# Patient Record
Sex: MALE | Race: WHITE | NOT HISPANIC OR LATINO | Employment: UNEMPLOYED | URBAN - METROPOLITAN AREA
[De-identification: names, ages, dates, MRNs, and addresses within clinical notes are randomized per-mention and may not be internally consistent; named-entity substitution may affect disease eponyms.]

---

## 2024-04-24 ENCOUNTER — APPOINTMENT (OUTPATIENT)
Dept: RADIOLOGY | Facility: HOSPITAL | Age: 44
DRG: 329 | End: 2024-04-24
Payer: COMMERCIAL

## 2024-04-24 ENCOUNTER — HOSPITAL ENCOUNTER (INPATIENT)
Facility: HOSPITAL | Age: 44
LOS: 15 days | Discharge: HOME | DRG: 329 | End: 2024-05-09
Attending: EMERGENCY MEDICINE | Admitting: INTERNAL MEDICINE
Payer: COMMERCIAL

## 2024-04-24 ENCOUNTER — ANESTHESIA EVENT (OUTPATIENT)
Dept: OPERATING ROOM | Facility: HOSPITAL | Age: 44
DRG: 329 | End: 2024-04-24
Payer: COMMERCIAL

## 2024-04-24 ENCOUNTER — ANESTHESIA (OUTPATIENT)
Dept: OPERATING ROOM | Facility: HOSPITAL | Age: 44
DRG: 329 | End: 2024-04-24
Payer: COMMERCIAL

## 2024-04-24 ENCOUNTER — APPOINTMENT (OUTPATIENT)
Dept: CARDIOLOGY | Facility: HOSPITAL | Age: 44
DRG: 329 | End: 2024-04-24
Payer: COMMERCIAL

## 2024-04-24 DIAGNOSIS — K65.1 INTRA-ABDOMINAL ABSCESS (MULTI): ICD-10-CM

## 2024-04-24 DIAGNOSIS — K63.1 BOWEL PERFORATION (MULTI): Primary | ICD-10-CM

## 2024-04-24 DIAGNOSIS — I10 PRIMARY HYPERTENSION: ICD-10-CM

## 2024-04-24 PROBLEM — K57.92 DIVERTICULITIS: Status: ACTIVE | Noted: 2024-04-24

## 2024-04-24 LAB
ABO GROUP (TYPE) IN BLOOD: NORMAL
ALBUMIN SERPL BCP-MCNC: 3.6 G/DL (ref 3.4–5)
ALP SERPL-CCNC: 73 U/L (ref 33–120)
ALT SERPL W P-5'-P-CCNC: 7 U/L (ref 10–52)
ANION GAP SERPL CALC-SCNC: 19 MMOL/L (ref 10–20)
ANTIBODY SCREEN: NORMAL
APPEARANCE UR: CLEAR
APTT PPP: 30 SECONDS (ref 27–38)
AST SERPL W P-5'-P-CCNC: 7 U/L (ref 9–39)
ATRIAL RATE: 116 BPM
BASOPHILS # BLD AUTO: 0.03 X10*3/UL (ref 0–0.1)
BASOPHILS NFR BLD AUTO: 0.3 %
BILIRUB SERPL-MCNC: 0.9 MG/DL (ref 0–1.2)
BILIRUB UR STRIP.AUTO-MCNC: ABNORMAL MG/DL
BUN SERPL-MCNC: 11 MG/DL (ref 6–23)
CALCIUM SERPL-MCNC: 9.2 MG/DL (ref 8.6–10.3)
CHLORIDE SERPL-SCNC: 93 MMOL/L (ref 98–107)
CO2 SERPL-SCNC: 24 MMOL/L (ref 21–32)
COLOR UR: YELLOW
CREAT SERPL-MCNC: 0.78 MG/DL (ref 0.5–1.3)
EGFRCR SERPLBLD CKD-EPI 2021: >90 ML/MIN/1.73M*2
EOSINOPHIL # BLD AUTO: 0.02 X10*3/UL (ref 0–0.7)
EOSINOPHIL NFR BLD AUTO: 0.2 %
ERYTHROCYTE [DISTWIDTH] IN BLOOD BY AUTOMATED COUNT: 13 % (ref 11.5–14.5)
GLUCOSE SERPL-MCNC: 112 MG/DL (ref 74–99)
GLUCOSE UR STRIP.AUTO-MCNC: NORMAL MG/DL
HCT VFR BLD AUTO: 36 % (ref 41–52)
HGB BLD-MCNC: 11.7 G/DL (ref 13.5–17.5)
HOLD SPECIMEN: NORMAL
IMM GRANULOCYTES # BLD AUTO: 0.06 X10*3/UL (ref 0–0.7)
IMM GRANULOCYTES NFR BLD AUTO: 0.5 % (ref 0–0.9)
INR PPP: 1.7 (ref 0.9–1.1)
KETONES UR STRIP.AUTO-MCNC: ABNORMAL MG/DL
LACTATE SERPL-SCNC: 2.2 MMOL/L (ref 0.4–2)
LACTATE SERPL-SCNC: 2.3 MMOL/L (ref 0.4–2)
LEUKOCYTE ESTERASE UR QL STRIP.AUTO: NEGATIVE
LIPASE SERPL-CCNC: <3 U/L (ref 9–82)
LYMPHOCYTES # BLD AUTO: 1.63 X10*3/UL (ref 1.2–4.8)
LYMPHOCYTES NFR BLD AUTO: 13.9 %
MCH RBC QN AUTO: 25.4 PG (ref 26–34)
MCHC RBC AUTO-ENTMCNC: 32.5 G/DL (ref 32–36)
MCV RBC AUTO: 78 FL (ref 80–100)
MONOCYTES # BLD AUTO: 1.79 X10*3/UL (ref 0.1–1)
MONOCYTES NFR BLD AUTO: 15.3 %
MUCOUS THREADS #/AREA URNS AUTO: NORMAL /LPF
NEUTROPHILS # BLD AUTO: 8.18 X10*3/UL (ref 1.2–7.7)
NEUTROPHILS NFR BLD AUTO: 69.8 %
NITRITE UR QL STRIP.AUTO: NEGATIVE
NRBC BLD-RTO: 0 /100 WBCS (ref 0–0)
P AXIS: 67 DEGREES
P OFFSET: 209 MS
P ONSET: 157 MS
PH UR STRIP.AUTO: 5.5 [PH]
PLATELET # BLD AUTO: 683 X10*3/UL (ref 150–450)
POTASSIUM SERPL-SCNC: 3.6 MMOL/L (ref 3.5–5.3)
PR INTERVAL: 130 MS
PROT SERPL-MCNC: 8.4 G/DL (ref 6.4–8.2)
PROT UR STRIP.AUTO-MCNC: ABNORMAL MG/DL
PROTHROMBIN TIME: 18.9 SECONDS (ref 9.8–12.8)
Q ONSET: 222 MS
QRS COUNT: 19 BEATS
QRS DURATION: 80 MS
QT INTERVAL: 300 MS
QTC CALCULATION(BAZETT): 417 MS
QTC FREDERICIA: 374 MS
R AXIS: 61 DEGREES
RBC # BLD AUTO: 4.61 X10*6/UL (ref 4.5–5.9)
RBC # UR STRIP.AUTO: NEGATIVE /UL
RBC #/AREA URNS AUTO: NORMAL /HPF
RH FACTOR (ANTIGEN D): NORMAL
SODIUM SERPL-SCNC: 132 MMOL/L (ref 136–145)
SP GR UR STRIP.AUTO: 1.05
T AXIS: 39 DEGREES
T OFFSET: 372 MS
UROBILINOGEN UR STRIP.AUTO-MCNC: ABNORMAL MG/DL
VENTRICULAR RATE: 116 BPM
WBC # BLD AUTO: 11.7 X10*3/UL (ref 4.4–11.3)
WBC #/AREA URNS AUTO: NORMAL /HPF

## 2024-04-24 PROCEDURE — 87040 BLOOD CULTURE FOR BACTERIA: CPT | Mod: AHULAB

## 2024-04-24 PROCEDURE — 36415 COLL VENOUS BLD VENIPUNCTURE: CPT

## 2024-04-24 PROCEDURE — 85025 COMPLETE CBC W/AUTO DIFF WBC: CPT

## 2024-04-24 PROCEDURE — 2500000004 HC RX 250 GENERAL PHARMACY W/ HCPCS (ALT 636 FOR OP/ED)

## 2024-04-24 PROCEDURE — 2500000004 HC RX 250 GENERAL PHARMACY W/ HCPCS (ALT 636 FOR OP/ED): Performed by: EMERGENCY MEDICINE

## 2024-04-24 PROCEDURE — 86900 BLOOD TYPING SEROLOGIC ABO: CPT | Performed by: EMERGENCY MEDICINE

## 2024-04-24 PROCEDURE — 3600000003 HC OR TIME - INITIAL BASE CHARGE - PROCEDURE LEVEL THREE: Performed by: SURGERY

## 2024-04-24 PROCEDURE — 2550000001 HC RX 255 CONTRASTS: Performed by: EMERGENCY MEDICINE

## 2024-04-24 PROCEDURE — A44143 PR PART REMOVAL COLON W END COLOSTOMY: Performed by: NURSE ANESTHETIST, CERTIFIED REGISTERED

## 2024-04-24 PROCEDURE — 93005 ELECTROCARDIOGRAM TRACING: CPT

## 2024-04-24 PROCEDURE — 36415 COLL VENOUS BLD VENIPUNCTURE: CPT | Performed by: EMERGENCY MEDICINE

## 2024-04-24 PROCEDURE — 87075 CULTR BACTERIA EXCEPT BLOOD: CPT | Mod: AHULAB | Performed by: REGISTERED NURSE

## 2024-04-24 PROCEDURE — 83690 ASSAY OF LIPASE: CPT

## 2024-04-24 PROCEDURE — 96361 HYDRATE IV INFUSION ADD-ON: CPT | Mod: 59

## 2024-04-24 PROCEDURE — A44143 PR PART REMOVAL COLON W END COLOSTOMY: Performed by: ANESTHESIOLOGY

## 2024-04-24 PROCEDURE — 99222 1ST HOSP IP/OBS MODERATE 55: CPT | Performed by: REGISTERED NURSE

## 2024-04-24 PROCEDURE — 87070 CULTURE OTHR SPECIMN AEROBIC: CPT | Mod: AHULAB | Performed by: REGISTERED NURSE

## 2024-04-24 PROCEDURE — 88307 TISSUE EXAM BY PATHOLOGIST: CPT | Performed by: PATHOLOGY

## 2024-04-24 PROCEDURE — 85730 THROMBOPLASTIN TIME PARTIAL: CPT | Performed by: EMERGENCY MEDICINE

## 2024-04-24 PROCEDURE — 51702 INSERT TEMP BLADDER CATH: CPT

## 2024-04-24 PROCEDURE — 96375 TX/PRO/DX INJ NEW DRUG ADDON: CPT | Mod: 59

## 2024-04-24 PROCEDURE — 83605 ASSAY OF LACTIC ACID: CPT

## 2024-04-24 PROCEDURE — 2500000004 HC RX 250 GENERAL PHARMACY W/ HCPCS (ALT 636 FOR OP/ED): Performed by: NURSE ANESTHETIST, CERTIFIED REGISTERED

## 2024-04-24 PROCEDURE — 2720000007 HC OR 272 NO HCPCS: Performed by: SURGERY

## 2024-04-24 PROCEDURE — 7100000002 HC RECOVERY ROOM TIME - EACH INCREMENTAL 1 MINUTE: Performed by: SURGERY

## 2024-04-24 PROCEDURE — 74177 CT ABD & PELVIS W/CONTRAST: CPT

## 2024-04-24 PROCEDURE — P9045 ALBUMIN (HUMAN), 5%, 250 ML: HCPCS | Mod: JZ | Performed by: NURSE ANESTHETIST, CERTIFIED REGISTERED

## 2024-04-24 PROCEDURE — 2500000004 HC RX 250 GENERAL PHARMACY W/ HCPCS (ALT 636 FOR OP/ED): Performed by: SURGERY

## 2024-04-24 PROCEDURE — 99222 1ST HOSP IP/OBS MODERATE 55: CPT | Performed by: INTERNAL MEDICINE

## 2024-04-24 PROCEDURE — 85610 PROTHROMBIN TIME: CPT | Performed by: EMERGENCY MEDICINE

## 2024-04-24 PROCEDURE — 2500000004 HC RX 250 GENERAL PHARMACY W/ HCPCS (ALT 636 FOR OP/ED): Performed by: ANESTHESIOLOGY

## 2024-04-24 PROCEDURE — 3700000002 HC GENERAL ANESTHESIA TIME - EACH INCREMENTAL 1 MINUTE: Performed by: SURGERY

## 2024-04-24 PROCEDURE — 71045 X-RAY EXAM CHEST 1 VIEW: CPT | Performed by: RADIOLOGY

## 2024-04-24 PROCEDURE — 96374 THER/PROPH/DIAG INJ IV PUSH: CPT | Mod: 59

## 2024-04-24 PROCEDURE — 87185 SC STD ENZYME DETCJ PER NZM: CPT | Mod: AHULAB | Performed by: REGISTERED NURSE

## 2024-04-24 PROCEDURE — 74177 CT ABD & PELVIS W/CONTRAST: CPT | Performed by: RADIOLOGY

## 2024-04-24 PROCEDURE — 81001 URINALYSIS AUTO W/SCOPE: CPT

## 2024-04-24 PROCEDURE — 99285 EMERGENCY DEPT VISIT HI MDM: CPT | Mod: 25

## 2024-04-24 PROCEDURE — 0D1N0Z4 BYPASS SIGMOID COLON TO CUTANEOUS, OPEN APPROACH: ICD-10-PCS | Performed by: SURGERY

## 2024-04-24 PROCEDURE — 0DBN0ZZ EXCISION OF SIGMOID COLON, OPEN APPROACH: ICD-10-PCS | Performed by: SURGERY

## 2024-04-24 PROCEDURE — 86850 RBC ANTIBODY SCREEN: CPT | Performed by: EMERGENCY MEDICINE

## 2024-04-24 PROCEDURE — 86901 BLOOD TYPING SEROLOGIC RH(D): CPT | Performed by: EMERGENCY MEDICINE

## 2024-04-24 PROCEDURE — A4217 STERILE WATER/SALINE, 500 ML: HCPCS | Performed by: SURGERY

## 2024-04-24 PROCEDURE — 80053 COMPREHEN METABOLIC PANEL: CPT

## 2024-04-24 PROCEDURE — 71045 X-RAY EXAM CHEST 1 VIEW: CPT

## 2024-04-24 PROCEDURE — 88307 TISSUE EXAM BY PATHOLOGIST: CPT | Mod: TC,AHULAB | Performed by: REGISTERED NURSE

## 2024-04-24 PROCEDURE — 44143 PARTIAL REMOVAL OF COLON: CPT | Performed by: SURGERY

## 2024-04-24 PROCEDURE — 3700000001 HC GENERAL ANESTHESIA TIME - INITIAL BASE CHARGE: Performed by: SURGERY

## 2024-04-24 PROCEDURE — 81003 URINALYSIS AUTO W/O SCOPE: CPT

## 2024-04-24 PROCEDURE — 99291 CRITICAL CARE FIRST HOUR: CPT | Mod: 25 | Performed by: EMERGENCY MEDICINE

## 2024-04-24 PROCEDURE — 2500000005 HC RX 250 GENERAL PHARMACY W/O HCPCS: Performed by: NURSE ANESTHETIST, CERTIFIED REGISTERED

## 2024-04-24 PROCEDURE — 7100000001 HC RECOVERY ROOM TIME - INITIAL BASE CHARGE: Performed by: SURGERY

## 2024-04-24 PROCEDURE — 3600000008 HC OR TIME - EACH INCREMENTAL 1 MINUTE - PROCEDURE LEVEL THREE: Performed by: SURGERY

## 2024-04-24 PROCEDURE — 1200000002 HC GENERAL ROOM WITH TELEMETRY DAILY

## 2024-04-24 RX ORDER — ONDANSETRON 4 MG/1
4 TABLET, FILM COATED ORAL EVERY 8 HOURS PRN
Status: DISCONTINUED | OUTPATIENT
Start: 2024-04-24 | End: 2024-04-26

## 2024-04-24 RX ORDER — SODIUM CHLORIDE, SODIUM LACTATE, POTASSIUM CHLORIDE, CALCIUM CHLORIDE 600; 310; 30; 20 MG/100ML; MG/100ML; MG/100ML; MG/100ML
INJECTION, SOLUTION INTRAVENOUS CONTINUOUS PRN
Status: DISCONTINUED | OUTPATIENT
Start: 2024-04-24 | End: 2024-04-24

## 2024-04-24 RX ORDER — POLYETHYLENE GLYCOL 3350 17 G/17G
17 POWDER, FOR SOLUTION ORAL DAILY
Status: DISCONTINUED | OUTPATIENT
Start: 2024-04-25 | End: 2024-04-24 | Stop reason: SDUPTHER

## 2024-04-24 RX ORDER — LIDOCAINE HYDROCHLORIDE 20 MG/ML
INJECTION, SOLUTION EPIDURAL; INFILTRATION; INTRACAUDAL; PERINEURAL AS NEEDED
Status: DISCONTINUED | OUTPATIENT
Start: 2024-04-24 | End: 2024-04-24

## 2024-04-24 RX ORDER — HEPARIN SODIUM 5000 [USP'U]/ML
5000 INJECTION, SOLUTION INTRAVENOUS; SUBCUTANEOUS EVERY 8 HOURS SCHEDULED
Status: DISCONTINUED | OUTPATIENT
Start: 2024-04-24 | End: 2024-05-09 | Stop reason: HOSPADM

## 2024-04-24 RX ORDER — ACETAMINOPHEN 650 MG/1
650 SUPPOSITORY RECTAL EVERY 4 HOURS PRN
Status: DISCONTINUED | OUTPATIENT
Start: 2024-04-24 | End: 2024-04-26

## 2024-04-24 RX ORDER — ACETAMINOPHEN 325 MG/1
650 TABLET ORAL EVERY 4 HOURS PRN
Status: DISCONTINUED | OUTPATIENT
Start: 2024-04-24 | End: 2024-04-24 | Stop reason: HOSPADM

## 2024-04-24 RX ORDER — NALOXONE HYDROCHLORIDE 0.4 MG/ML
0.2 INJECTION, SOLUTION INTRAMUSCULAR; INTRAVENOUS; SUBCUTANEOUS AS NEEDED
Status: DISCONTINUED | OUTPATIENT
Start: 2024-04-24 | End: 2024-05-09 | Stop reason: HOSPADM

## 2024-04-24 RX ORDER — PROPOFOL 10 MG/ML
INJECTION, EMULSION INTRAVENOUS AS NEEDED
Status: DISCONTINUED | OUTPATIENT
Start: 2024-04-24 | End: 2024-04-24

## 2024-04-24 RX ORDER — VANCOMYCIN HYDROCHLORIDE 1 G/20ML
INJECTION, POWDER, LYOPHILIZED, FOR SOLUTION INTRAVENOUS DAILY PRN
Status: DISCONTINUED | OUTPATIENT
Start: 2024-04-24 | End: 2024-04-28

## 2024-04-24 RX ORDER — ROCURONIUM BROMIDE 10 MG/ML
INJECTION, SOLUTION INTRAVENOUS AS NEEDED
Status: DISCONTINUED | OUTPATIENT
Start: 2024-04-24 | End: 2024-04-24

## 2024-04-24 RX ORDER — ONDANSETRON HYDROCHLORIDE 2 MG/ML
4 INJECTION, SOLUTION INTRAVENOUS EVERY 8 HOURS PRN
Status: DISCONTINUED | OUTPATIENT
Start: 2024-04-24 | End: 2024-04-26

## 2024-04-24 RX ORDER — LIDOCAINE HYDROCHLORIDE 10 MG/ML
0.1 INJECTION, SOLUTION EPIDURAL; INFILTRATION; INTRACAUDAL; PERINEURAL ONCE
Status: DISCONTINUED | OUTPATIENT
Start: 2024-04-24 | End: 2024-04-24 | Stop reason: HOSPADM

## 2024-04-24 RX ORDER — MIDAZOLAM HYDROCHLORIDE 1 MG/ML
INJECTION INTRAMUSCULAR; INTRAVENOUS AS NEEDED
Status: DISCONTINUED | OUTPATIENT
Start: 2024-04-24 | End: 2024-04-24

## 2024-04-24 RX ORDER — MORPHINE SULFATE 2 MG/ML
2 INJECTION, SOLUTION INTRAMUSCULAR; INTRAVENOUS EVERY 4 HOURS PRN
Status: DISCONTINUED | OUTPATIENT
Start: 2024-04-24 | End: 2024-05-09 | Stop reason: HOSPADM

## 2024-04-24 RX ORDER — HYDROMORPHONE HCL/0.9% NACL/PF 15 MG/30ML
PATIENT CONTROLLED ANALGESIA SYRINGE INTRAVENOUS CONTINUOUS
Status: DISCONTINUED | OUTPATIENT
Start: 2024-04-24 | End: 2024-04-29

## 2024-04-24 RX ORDER — HYDROMORPHONE HYDROCHLORIDE 1 MG/ML
INJECTION, SOLUTION INTRAMUSCULAR; INTRAVENOUS; SUBCUTANEOUS AS NEEDED
Status: DISCONTINUED | OUTPATIENT
Start: 2024-04-24 | End: 2024-04-24

## 2024-04-24 RX ORDER — DEXMEDETOMIDINE IN 0.9 % NACL 20 MCG/5ML
SYRINGE (ML) INTRAVENOUS AS NEEDED
Status: DISCONTINUED | OUTPATIENT
Start: 2024-04-24 | End: 2024-04-24

## 2024-04-24 RX ORDER — ONDANSETRON HYDROCHLORIDE 2 MG/ML
4 INJECTION, SOLUTION INTRAVENOUS ONCE
Status: COMPLETED | OUTPATIENT
Start: 2024-04-24 | End: 2024-04-24

## 2024-04-24 RX ORDER — ACETAMINOPHEN 160 MG/5ML
650 SOLUTION ORAL EVERY 4 HOURS PRN
Status: DISCONTINUED | OUTPATIENT
Start: 2024-04-24 | End: 2024-04-26

## 2024-04-24 RX ORDER — SODIUM CHLORIDE 0.9 G/100ML
IRRIGANT IRRIGATION AS NEEDED
Status: DISCONTINUED | OUTPATIENT
Start: 2024-04-24 | End: 2024-04-24 | Stop reason: HOSPADM

## 2024-04-24 RX ORDER — PHENYLEPHRINE HCL IN 0.9% NACL 1 MG/10 ML
SYRINGE (ML) INTRAVENOUS AS NEEDED
Status: DISCONTINUED | OUTPATIENT
Start: 2024-04-24 | End: 2024-04-24

## 2024-04-24 RX ORDER — DEXTROSE MONOHYDRATE, SODIUM CHLORIDE, AND POTASSIUM CHLORIDE 50; 1.49; 4.5 G/1000ML; G/1000ML; G/1000ML
100 INJECTION, SOLUTION INTRAVENOUS CONTINUOUS
Status: DISCONTINUED | OUTPATIENT
Start: 2024-04-24 | End: 2024-05-03

## 2024-04-24 RX ORDER — DOCUSATE SODIUM 100 MG/1
100 CAPSULE, LIQUID FILLED ORAL 2 TIMES DAILY
Status: DISCONTINUED | OUTPATIENT
Start: 2024-04-24 | End: 2024-05-09 | Stop reason: HOSPADM

## 2024-04-24 RX ORDER — VANCOMYCIN HYDROCHLORIDE 1 G/200ML
1000 INJECTION, SOLUTION INTRAVENOUS EVERY 12 HOURS
Status: DISCONTINUED | OUTPATIENT
Start: 2024-04-25 | End: 2024-04-26

## 2024-04-24 RX ORDER — PANTOPRAZOLE SODIUM 40 MG/10ML
40 INJECTION, POWDER, LYOPHILIZED, FOR SOLUTION INTRAVENOUS
Status: DISCONTINUED | OUTPATIENT
Start: 2024-04-25 | End: 2024-05-09 | Stop reason: HOSPADM

## 2024-04-24 RX ORDER — ESMOLOL HYDROCHLORIDE 10 MG/ML
INJECTION, SOLUTION INTRAVENOUS CONTINUOUS PRN
Status: DISCONTINUED | OUTPATIENT
Start: 2024-04-24 | End: 2024-04-24

## 2024-04-24 RX ORDER — ONDANSETRON HYDROCHLORIDE 2 MG/ML
4 INJECTION, SOLUTION INTRAVENOUS EVERY 6 HOURS PRN
Status: DISCONTINUED | OUTPATIENT
Start: 2024-04-24 | End: 2024-04-24 | Stop reason: SDUPTHER

## 2024-04-24 RX ORDER — ESMOLOL HYDROCHLORIDE 10 MG/ML
INJECTION INTRAVENOUS AS NEEDED
Status: DISCONTINUED | OUTPATIENT
Start: 2024-04-24 | End: 2024-04-24

## 2024-04-24 RX ORDER — ACETAMINOPHEN 10 MG/ML
1000 INJECTION, SOLUTION INTRAVENOUS ONCE
Status: DISCONTINUED | OUTPATIENT
Start: 2024-04-24 | End: 2024-04-26 | Stop reason: WASHOUT

## 2024-04-24 RX ORDER — ACETAMINOPHEN 325 MG/1
650 TABLET ORAL EVERY 4 HOURS PRN
Status: DISCONTINUED | OUTPATIENT
Start: 2024-04-24 | End: 2024-04-26

## 2024-04-24 RX ORDER — SODIUM CHLORIDE, SODIUM LACTATE, POTASSIUM CHLORIDE, CALCIUM CHLORIDE 600; 310; 30; 20 MG/100ML; MG/100ML; MG/100ML; MG/100ML
100 INJECTION, SOLUTION INTRAVENOUS CONTINUOUS
Status: DISCONTINUED | OUTPATIENT
Start: 2024-04-24 | End: 2024-04-24 | Stop reason: HOSPADM

## 2024-04-24 RX ORDER — ALBUMIN HUMAN 50 G/1000ML
SOLUTION INTRAVENOUS AS NEEDED
Status: DISCONTINUED | OUTPATIENT
Start: 2024-04-24 | End: 2024-04-24

## 2024-04-24 RX ORDER — HYDROMORPHONE HYDROCHLORIDE 1 MG/ML
1 INJECTION, SOLUTION INTRAMUSCULAR; INTRAVENOUS; SUBCUTANEOUS ONCE
Status: COMPLETED | OUTPATIENT
Start: 2024-04-24 | End: 2024-04-24

## 2024-04-24 RX ORDER — POLYETHYLENE GLYCOL 3350 17 G/17G
17 POWDER, FOR SOLUTION ORAL DAILY
Status: DISCONTINUED | OUTPATIENT
Start: 2024-04-25 | End: 2024-05-09 | Stop reason: HOSPADM

## 2024-04-24 RX ORDER — HEPARIN SODIUM 5000 [USP'U]/ML
5000 INJECTION, SOLUTION INTRAVENOUS; SUBCUTANEOUS EVERY 8 HOURS
Status: DISCONTINUED | OUTPATIENT
Start: 2024-04-24 | End: 2024-04-24 | Stop reason: SDUPTHER

## 2024-04-24 RX ORDER — OXYCODONE HYDROCHLORIDE 5 MG/1
5 TABLET ORAL EVERY 4 HOURS PRN
Status: DISCONTINUED | OUTPATIENT
Start: 2024-04-24 | End: 2024-04-24 | Stop reason: HOSPADM

## 2024-04-24 RX ORDER — VANCOMYCIN HYDROCHLORIDE 1 G/200ML
1000 INJECTION, SOLUTION INTRAVENOUS ONCE
Status: COMPLETED | OUTPATIENT
Start: 2024-04-24 | End: 2024-04-24

## 2024-04-24 RX ORDER — VANCOMYCIN HYDROCHLORIDE 1 G/200ML
1 INJECTION, SOLUTION INTRAVENOUS ONCE
Status: DISCONTINUED | OUTPATIENT
Start: 2024-04-24 | End: 2024-04-24

## 2024-04-24 RX ADMIN — HYDROMORPHONE HYDROCHLORIDE 0.5 MG: 1 INJECTION, SOLUTION INTRAMUSCULAR; INTRAVENOUS; SUBCUTANEOUS at 17:39

## 2024-04-24 RX ADMIN — LIDOCAINE HYDROCHLORIDE 100 MG: 20 INJECTION, SOLUTION EPIDURAL; INFILTRATION; INTRACAUDAL; PERINEURAL at 15:54

## 2024-04-24 RX ADMIN — IOHEXOL 85 ML: 350 INJECTION, SOLUTION INTRAVENOUS at 10:49

## 2024-04-24 RX ADMIN — ESMOLOL HYDROCHLORIDE 50 MG: 100 INJECTION, SOLUTION INTRAVENOUS at 16:00

## 2024-04-24 RX ADMIN — PROPOFOL 200 MG: 10 INJECTION, EMULSION INTRAVENOUS at 15:54

## 2024-04-24 RX ADMIN — Medication 8 MCG: at 16:04

## 2024-04-24 RX ADMIN — ACETAMINOPHEN 650 MG: 325 TABLET ORAL at 19:19

## 2024-04-24 RX ADMIN — Medication 100 MCG: at 16:19

## 2024-04-24 RX ADMIN — ROCURONIUM BROMIDE 80 MG: 10 INJECTION, SOLUTION INTRAVENOUS at 15:54

## 2024-04-24 RX ADMIN — SUGAMMADEX 200 MG: 100 INJECTION, SOLUTION INTRAVENOUS at 17:47

## 2024-04-24 RX ADMIN — VANCOMYCIN HYDROCHLORIDE 1000 MG: 1 INJECTION, SOLUTION INTRAVENOUS at 13:06

## 2024-04-24 RX ADMIN — ESMOLOL HYDROCHLORIDE 17.82 MCG/KG/MIN: 10 INJECTION INTRAVENOUS at 16:48

## 2024-04-24 RX ADMIN — SODIUM CHLORIDE, POTASSIUM CHLORIDE, SODIUM LACTATE AND CALCIUM CHLORIDE: 600; 310; 30; 20 INJECTION, SOLUTION INTRAVENOUS at 17:15

## 2024-04-24 RX ADMIN — ONDANSETRON 4 MG: 2 INJECTION INTRAMUSCULAR; INTRAVENOUS at 09:30

## 2024-04-24 RX ADMIN — HYDROMORPHONE HYDROCHLORIDE 1 MG: 1 INJECTION, SOLUTION INTRAMUSCULAR; INTRAVENOUS; SUBCUTANEOUS at 09:30

## 2024-04-24 RX ADMIN — Medication 100 MCG: at 17:22

## 2024-04-24 RX ADMIN — Medication 20 MG: at 16:04

## 2024-04-24 RX ADMIN — ROCURONIUM BROMIDE 30 MG: 10 INJECTION, SOLUTION INTRAVENOUS at 17:16

## 2024-04-24 RX ADMIN — HYDROMORPHONE HYDROCHLORIDE 0.5 MG: 1 INJECTION, SOLUTION INTRAMUSCULAR; INTRAVENOUS; SUBCUTANEOUS at 19:22

## 2024-04-24 RX ADMIN — ROCURONIUM BROMIDE 20 MG: 10 INJECTION, SOLUTION INTRAVENOUS at 16:10

## 2024-04-24 RX ADMIN — ROCURONIUM BROMIDE 50 MG: 10 INJECTION, SOLUTION INTRAVENOUS at 16:42

## 2024-04-24 RX ADMIN — Medication 30 MG: at 15:54

## 2024-04-24 RX ADMIN — HYDROMORPHONE HYDROCHLORIDE 0.5 MG: 1 INJECTION, SOLUTION INTRAMUSCULAR; INTRAVENOUS; SUBCUTANEOUS at 17:54

## 2024-04-24 RX ADMIN — MIDAZOLAM HYDROCHLORIDE 2 MG: 1 INJECTION, SOLUTION INTRAMUSCULAR; INTRAVENOUS at 15:47

## 2024-04-24 RX ADMIN — SODIUM CHLORIDE, POTASSIUM CHLORIDE, SODIUM LACTATE AND CALCIUM CHLORIDE: 600; 310; 30; 20 INJECTION, SOLUTION INTRAVENOUS at 15:48

## 2024-04-24 RX ADMIN — PIPERACILLIN SODIUM AND TAZOBACTAM SODIUM 3.38 G: 3; .375 INJECTION, SOLUTION INTRAVENOUS at 12:12

## 2024-04-24 RX ADMIN — SODIUM CHLORIDE 1000 ML: 9 INJECTION, SOLUTION INTRAVENOUS at 09:29

## 2024-04-24 RX ADMIN — Medication 12 MCG: at 15:54

## 2024-04-24 RX ADMIN — HYDROMORPHONE HYDROCHLORIDE 1 MG: 1 INJECTION, SOLUTION INTRAMUSCULAR; INTRAVENOUS; SUBCUTANEOUS at 17:58

## 2024-04-24 RX ADMIN — ALBUMIN (HUMAN) 250 ML: 12.5 INJECTION, SOLUTION INTRAVENOUS at 17:30

## 2024-04-24 RX ADMIN — DEXAMETHASONE SODIUM PHOSPHATE 4 MG: 4 INJECTION, SOLUTION INTRAMUSCULAR; INTRAVENOUS at 16:00

## 2024-04-24 RX ADMIN — Medication: at 22:59

## 2024-04-24 SDOH — SOCIAL STABILITY: SOCIAL INSECURITY: HAS ANYONE EVER THREATENED TO HURT YOUR FAMILY OR YOUR PETS?: NO

## 2024-04-24 SDOH — SOCIAL STABILITY: SOCIAL INSECURITY: WERE YOU ABLE TO COMPLETE ALL THE BEHAVIORAL HEALTH SCREENINGS?: YES

## 2024-04-24 SDOH — SOCIAL STABILITY: SOCIAL INSECURITY: ABUSE: ADULT

## 2024-04-24 SDOH — SOCIAL STABILITY: SOCIAL INSECURITY: DO YOU FEEL UNSAFE GOING BACK TO THE PLACE WHERE YOU ARE LIVING?: NO

## 2024-04-24 SDOH — SOCIAL STABILITY: SOCIAL INSECURITY: ARE THERE ANY APPARENT SIGNS OF INJURIES/BEHAVIORS THAT COULD BE RELATED TO ABUSE/NEGLECT?: NO

## 2024-04-24 SDOH — HEALTH STABILITY: MENTAL HEALTH: CURRENT SMOKER: 0

## 2024-04-24 SDOH — SOCIAL STABILITY: SOCIAL INSECURITY: DOES ANYONE TRY TO KEEP YOU FROM HAVING/CONTACTING OTHER FRIENDS OR DOING THINGS OUTSIDE YOUR HOME?: NO

## 2024-04-24 SDOH — SOCIAL STABILITY: SOCIAL INSECURITY: HAVE YOU HAD THOUGHTS OF HARMING ANYONE ELSE?: NO

## 2024-04-24 SDOH — SOCIAL STABILITY: SOCIAL INSECURITY: ARE YOU OR HAVE YOU BEEN THREATENED OR ABUSED PHYSICALLY, EMOTIONALLY, OR SEXUALLY BY ANYONE?: NO

## 2024-04-24 SDOH — SOCIAL STABILITY: SOCIAL INSECURITY: DO YOU FEEL ANYONE HAS EXPLOITED OR TAKEN ADVANTAGE OF YOU FINANCIALLY OR OF YOUR PERSONAL PROPERTY?: NO

## 2024-04-24 ASSESSMENT — COGNITIVE AND FUNCTIONAL STATUS - GENERAL
PATIENT BASELINE BEDBOUND: NO
DAILY ACTIVITIY SCORE: 24
DAILY ACTIVITIY SCORE: 24
MOBILITY SCORE: 24
MOBILITY SCORE: 24

## 2024-04-24 ASSESSMENT — PAIN - FUNCTIONAL ASSESSMENT
PAIN_FUNCTIONAL_ASSESSMENT: 0-10

## 2024-04-24 ASSESSMENT — PATIENT HEALTH QUESTIONNAIRE - PHQ9
SUM OF ALL RESPONSES TO PHQ9 QUESTIONS 1 & 2: 0
2. FEELING DOWN, DEPRESSED OR HOPELESS: NOT AT ALL
1. LITTLE INTEREST OR PLEASURE IN DOING THINGS: NOT AT ALL

## 2024-04-24 ASSESSMENT — ENCOUNTER SYMPTOMS
EYE ITCHING: 0
CHILLS: 0
HEADACHES: 0
SEIZURES: 0
FREQUENCY: 0
SINUS PAIN: 0
PALPITATIONS: 0
JOINT SWELLING: 0
SINUS PRESSURE: 0
WEAKNESS: 0
DYSURIA: 0
BACK PAIN: 0
SLEEP DISTURBANCE: 0
DIARRHEA: 0
CHOKING: 0
ARTHRALGIAS: 0
UNEXPECTED WEIGHT CHANGE: 0
ABDOMINAL PAIN: 1
NAUSEA: 0
FACIAL ASYMMETRY: 0
HEMATURIA: 0
POLYPHAGIA: 0
DECREASED CONCENTRATION: 0
MYALGIAS: 0
NUMBNESS: 0
FACIAL SWELLING: 0
LIGHT-HEADEDNESS: 0
EYE REDNESS: 0
FEVER: 0
ANAL BLEEDING: 0
POLYDIPSIA: 0
BLOOD IN STOOL: 0
SHORTNESS OF BREATH: 0
COUGH: 0
AGITATION: 0
WHEEZING: 0
APPETITE CHANGE: 0
RECTAL PAIN: 0
APNEA: 0
BRUISES/BLEEDS EASILY: 0
COLOR CHANGE: 0
EYE PAIN: 0
CONSTIPATION: 0
FATIGUE: 0
NECK PAIN: 0
DYSPHORIC MOOD: 0
CONFUSION: 0
WOUND: 0
VOMITING: 0
ADENOPATHY: 0
DIAPHORESIS: 0
CHEST TIGHTNESS: 0
FLANK PAIN: 0
VOICE CHANGE: 0
HYPERACTIVE: 0
EYE DISCHARGE: 0
NERVOUS/ANXIOUS: 0
SPEECH DIFFICULTY: 0
SORE THROAT: 0
NECK STIFFNESS: 0
RHINORRHEA: 0
PHOTOPHOBIA: 0
TROUBLE SWALLOWING: 0
HALLUCINATIONS: 0
STRIDOR: 0
TREMORS: 0
DIFFICULTY URINATING: 0
ACTIVITY CHANGE: 0
DIZZINESS: 0

## 2024-04-24 ASSESSMENT — PAIN SCALES - GENERAL
PAINLEVEL_OUTOF10: 4
PAINLEVEL_OUTOF10: 0 - NO PAIN
PAINLEVEL_OUTOF10: 3
PAIN_LEVEL: 0
PAINLEVEL_OUTOF10: 0 - NO PAIN
PAINLEVEL_OUTOF10: 5 - MODERATE PAIN
PAINLEVEL_OUTOF10: 7
PAINLEVEL_OUTOF10: 2
PAINLEVEL_OUTOF10: 0 - NO PAIN
PAINLEVEL_OUTOF10: 6
PAINLEVEL_OUTOF10: 4
PAINLEVEL_OUTOF10: 0 - NO PAIN
PAINLEVEL_OUTOF10: 2
PAINLEVEL_OUTOF10: 4
PAINLEVEL_OUTOF10: 0 - NO PAIN

## 2024-04-24 ASSESSMENT — ACTIVITIES OF DAILY LIVING (ADL)
BATHING: INDEPENDENT
PATIENT'S MEMORY ADEQUATE TO SAFELY COMPLETE DAILY ACTIVITIES?: YES
WALKS IN HOME: INDEPENDENT
LACK_OF_TRANSPORTATION: NO
TOILETING: INDEPENDENT
GROOMING: INDEPENDENT
FEEDING YOURSELF: INDEPENDENT
HEARING - RIGHT EAR: FUNCTIONAL
DRESSING YOURSELF: INDEPENDENT
HEARING - LEFT EAR: FUNCTIONAL
ADEQUATE_TO_COMPLETE_ADL: YES
JUDGMENT_ADEQUATE_SAFELY_COMPLETE_DAILY_ACTIVITIES: YES

## 2024-04-24 ASSESSMENT — LIFESTYLE VARIABLES
AUDIT-C TOTAL SCORE: 0
HOW OFTEN DO YOU HAVE A DRINK CONTAINING ALCOHOL: NEVER
AUDIT-C TOTAL SCORE: 0
SKIP TO QUESTIONS 9-10: 1
HOW MANY STANDARD DRINKS CONTAINING ALCOHOL DO YOU HAVE ON A TYPICAL DAY: PATIENT DOES NOT DRINK
HOW OFTEN DO YOU HAVE 6 OR MORE DRINKS ON ONE OCCASION: NEVER

## 2024-04-24 ASSESSMENT — COLUMBIA-SUICIDE SEVERITY RATING SCALE - C-SSRS
2. HAVE YOU ACTUALLY HAD ANY THOUGHTS OF KILLING YOURSELF?: NO
6. HAVE YOU EVER DONE ANYTHING, STARTED TO DO ANYTHING, OR PREPARED TO DO ANYTHING TO END YOUR LIFE?: NO
1. IN THE PAST MONTH, HAVE YOU WISHED YOU WERE DEAD OR WISHED YOU COULD GO TO SLEEP AND NOT WAKE UP?: NO

## 2024-04-24 ASSESSMENT — PAIN DESCRIPTION - LOCATION: LOCATION: ABDOMEN

## 2024-04-24 NOTE — PROGRESS NOTES
"Vancomycin Dosing by Pharmacy- INITIAL    James Ramirez is a 44 y.o. year old male who Pharmacy has been consulted for vancomycin dosing for other /intra-abdominal . Based on the patient's indication and renal status this patient will be dosed based on a goal AUC of 400-600.     Renal function is currently stable.    Visit Vitals  BP (!) 137/93   Pulse (!) 108   Temp 36.4 °C (97.5 °F) (Oral)   Resp 16        Lab Results   Component Value Date    CREATININE 0.78 04/24/2024        Patient weight is No results found for: \"PTWEIGHT\"    No results found for: \"CULTURE\"     No intake/output data recorded.  [unfilled]    No results found for: \"PATIENTTEMP\"       Assessment/Plan     Patient will not be given a loading dose.  Will initiate vancomycin maintenance,  1000 mg every 12 hours.    This dosing regimen is predicted by [employer] to result in the following pharmacokinetic parameters:  Start time: 01:06 on 04/25/2024  Exposure target: AUC24 (range)400-600 mg/L.hr   AUC24,ss: 413 mg/L.hr  Probability of AUC24 > 400: 53 %  Ctrough,ss: 12 mg/L  Probability of Ctrough,ss > 20: 15 %  Probability of nephrotoxicity (Lodise MCKAY 2009): 7 %    Follow-up level will be ordered on 4/26 at 0500 unless clinically indicated sooner.  Will continue to monitor renal function daily while on vancomycin and order serum creatinine at least every 48 hours if not already ordered.  Follow for continued vancomycin needs, clinical response, and signs/symptoms of toxicity.       Sherri Phan, PharmD       "

## 2024-04-24 NOTE — H&P (VIEW-ONLY)
"Reason For Consult  Abdominal pain    History Of Present Illness  James Ramirez is a 44 y.o. male presenting with lower abdominal pain that has been on-going for a week. Last meal was Saturday and had a few snacks on Sunday, but did not eat yesterday. Last bowel movement was this morning, liquid. Patient denies any N/V. Patient upset because he feels his job could have contributed to this as he does heavy lifting (50lbs bags of fertilizer).    CT obtained in ED c/f \"central intraabdominal abscess measuring 9.1 x 7.9 x 3.8 cm.. There are multiple dots of air around this larger abscess consistent with localized perforation and viscus perforation. This is likely perforated sigmoid diverticulitis. There are smaller abscesses in the abdomen and pelvis.\" WBC 11.7, lactate 2.2. Patient started on Vanc/Zosyn. Surgery was consulted for this.     Patient with PMHx: L inguinal hernia repair with mesh in 2009  and diverticulitis c/b hepatic abscess s/p drain in 2022.     Denies and etoh, tobacco and illicit drug use    Family History  No family history on file.     Allergies  Patient has no known allergies.    Review of Systems  12 point ROS negative unless stated above       Physical Exam  PE:  Constitutional: A&Ox3, calm and cooperative, NAD  Eyes: EOMI, clear sclera   Cardiovascular: Normal rate and regular rhythm. No murmurs  Respiratory/Thorax: CTAB, on RA  Gastrointestinal: Rigid abdomen below the umbilical region with TTP and distention.  Genitourinary: Voiding independently   Musculoskeletal: ROM intact, no joint swelling, normal strength  Extremities: No peripheral edema, contusions, or wounds  Neurological: A&Ox3, No focal deficits   Psychological: Appropriate mood and behavior       Last Recorded Vitals  Blood pressure (!) 137/93, pulse (!) 108, temperature 36.4 °C (97.5 °F), temperature source Oral, resp. rate 16, height 1.83 m (6' 0.05\"), weight 74.8 kg (165 lb), SpO2 99%.    Relevant Results  Results for orders " placed or performed during the hospital encounter of 04/24/24 (from the past 24 hour(s))   Lactate   Result Value Ref Range    Lactate 2.2 (H) 0.4 - 2.0 mmol/L   Lipase   Result Value Ref Range    Lipase <3 (L) 9 - 82 U/L   Comprehensive Metabolic Panel   Result Value Ref Range    Glucose 112 (H) 74 - 99 mg/dL    Sodium 132 (L) 136 - 145 mmol/L    Potassium 3.6 3.5 - 5.3 mmol/L    Chloride 93 (L) 98 - 107 mmol/L    Bicarbonate 24 21 - 32 mmol/L    Anion Gap 19 10 - 20 mmol/L    Urea Nitrogen 11 6 - 23 mg/dL    Creatinine 0.78 0.50 - 1.30 mg/dL    eGFR >90 >60 mL/min/1.73m*2    Calcium 9.2 8.6 - 10.3 mg/dL    Albumin 3.6 3.4 - 5.0 g/dL    Alkaline Phosphatase 73 33 - 120 U/L    Total Protein 8.4 (H) 6.4 - 8.2 g/dL    AST 7 (L) 9 - 39 U/L    Bilirubin, Total 0.9 0.0 - 1.2 mg/dL    ALT 7 (L) 10 - 52 U/L   CBC and Auto Differential   Result Value Ref Range    WBC 11.7 (H) 4.4 - 11.3 x10*3/uL    nRBC 0.0 0.0 - 0.0 /100 WBCs    RBC 4.61 4.50 - 5.90 x10*6/uL    Hemoglobin 11.7 (L) 13.5 - 17.5 g/dL    Hematocrit 36.0 (L) 41.0 - 52.0 %    MCV 78 (L) 80 - 100 fL    MCH 25.4 (L) 26.0 - 34.0 pg    MCHC 32.5 32.0 - 36.0 g/dL    RDW 13.0 11.5 - 14.5 %    Platelets 683 (H) 150 - 450 x10*3/uL    Neutrophils % 69.8 40.0 - 80.0 %    Immature Granulocytes %, Automated 0.5 0.0 - 0.9 %    Lymphocytes % 13.9 13.0 - 44.0 %    Monocytes % 15.3 2.0 - 10.0 %    Eosinophils % 0.2 0.0 - 6.0 %    Basophils % 0.3 0.0 - 2.0 %    Neutrophils Absolute 8.18 (H) 1.20 - 7.70 x10*3/uL    Immature Granulocytes Absolute, Automated 0.06 0.00 - 0.70 x10*3/uL    Lymphocytes Absolute 1.63 1.20 - 4.80 x10*3/uL    Monocytes Absolute 1.79 (H) 0.10 - 1.00 x10*3/uL    Eosinophils Absolute 0.02 0.00 - 0.70 x10*3/uL    Basophils Absolute 0.03 0.00 - 0.10 x10*3/uL   ECG 12 Lead   Result Value Ref Range    Ventricular Rate 116 BPM    Atrial Rate 116 BPM    RI Interval 130 ms    QRS Duration 80 ms    QT Interval 300 ms    QTC Calculation(Bazett) 417 ms    P Axis 67  degrees    R Axis 61 degrees    T Axis 39 degrees    QRS Count 19 beats    Q Onset 222 ms    P Onset 157 ms    P Offset 209 ms    T Offset 372 ms    QTC Fredericia 374 ms   Urinalysis with Reflex Culture and Microscopic   Result Value Ref Range    Color, Urine Yellow Light-Yellow, Yellow, Dark-Yellow    Appearance, Urine Clear Clear    Specific Gravity, Urine 1.047 (N) 1.005 - 1.035    pH, Urine 5.5 5.0, 5.5, 6.0, 6.5, 7.0, 7.5, 8.0    Protein, Urine 30 (1+) (A) NEGATIVE, 10 (TRACE), 20 (TRACE) mg/dL    Glucose, Urine Normal Normal mg/dL    Blood, Urine NEGATIVE NEGATIVE    Ketones, Urine 150 (4+) (A) NEGATIVE mg/dL    Bilirubin, Urine 0.5 (1+) (A) NEGATIVE    Urobilinogen, Urine 4 (2+) (A) Normal mg/dL    Nitrite, Urine NEGATIVE NEGATIVE    Leukocyte Esterase, Urine NEGATIVE NEGATIVE   Urinalysis Microscopic   Result Value Ref Range    WBC, Urine 1-5 1-5, NONE /HPF    RBC, Urine 1-2 NONE, 1-2, 3-5 /HPF    Mucus, Urine 3+ Reference range not established. /LPF   Lactate   Result Value Ref Range    Lactate 2.3 (H) 0.4 - 2.0 mmol/L     XR chest 1 view    Result Date: 4/24/2024  Interpreted By:  Joleen Pascual, STUDY: Chest, single AP view.   INDICATION: Signs/Symptoms:Pre-op.   COMPARISON: CT of the abdomen and pelvis study dated 04/24/2024.   ACCESSION NUMBER(S): [id number]   ORDERING CLINICIAN: MEHDI BROWN   FINDINGS: The cardiac silhouette size is within normal limits. There is no focal consolidation, edema or pneumothorax. No sizeable pleural effusion. Calcified granuloma noted in the right mid lung. No acute osseous abnormality.       1. No acute cardiopulmonary process.   MACRO: None.   Signed by: Joleen Pascual 4/24/2024 11:59 AM Dictation workstation:   [id number]    CT abdomen pelvis w IV contrast    Result Date: 4/24/2024  Interpreted By:  Julianne Meneses, STUDY: CT ABDOMEN PELVIS W IV CONTRAST;  4/24/2024 11:00 am   INDICATION: Signs/Symptoms:lower abdominal pain and swelling eval for possible  incarcerated hernia.   COMPARISON: None.   ACCESSION NUMBER(S): [id number]   ORDERING CLINICIAN: AILYN PANG   TECHNIQUE: CT of the abdomen and pelvis was performed.  85 mL Omnipaque 350   FINDINGS: LOWER CHEST: Images of the lung bases show no infiltrate or pleural fluid.   ABDOMEN:   LIVER: There is no hepatic mass.   BILE DUCTS: There is no intrahepatic, common hepatic or common bile ductal dilatation.   GALLBLADDER: The gallbladder is unremarkable.   PANCREAS: The pancreas is unremarkable.   SPLEEN: The spleen is unremarkable. There is no splenic mass or splenomegaly.   ADRENAL GLANDS: The adrenal glands are unremarkable.   KIDNEYS AND URETERS: The kidneys function symmetrically. The kidneys demonstrate no mass. There is no intrarenal calculus or hydronephrosis.     VESSELS: The abdominal and pelvic vessels are unremarkable.   PERITONEUM/RETROPERITONEUM/LYMPH NODES: There is no retroperitoneal or pelvic adenopathy.  There is no ascites.   ABDOMINAL WALL/BOWEL: There is thickening of the right and left rectus abdominis muscles. There is an abscess containing fluid and air in the anterior abdominal wall the in the midline between the rectus abdominis muscles. This measures 9.0 x 2.7 x 3.6 cm in longitudinal, transverse and AP dimensions respectively. There are multiple smaller dots of air in the subcutaneous fat. There is an abscess with an air-fluid level centrally in the pelvis measuring 9.1 x 7.9 x 3.8 cm in longitudinal, transverse and AP dimensions respectively. There are multiple dots of air in the peritoneal cavity around this larger abscess. Findings are consistent with a viscus perforation, likely extending into the anterior abdominal wall. There is a 2.0 x 0.8 cm abscess in the left side of the pelvis with smaller dots of air. There is a thickened loop of sigmoid colon and this is likely perforated sigmoid diverticulitis.   BONE AND SOFT TISSUE: There is no acute osseous finding.   There is no soft  tissue abnormality.       1. Central intraabdominal abscess measuring 9.1 x 7.9 x 3.8 cm.. There are multiple dots of air around this larger abscess consistent with localized perforation and viscus perforation. This is likely perforated sigmoid diverticulitis. There are smaller abscesses in the abdomen and pelvis.   2. This abscess extends into the anterior abdominal wall between the rectus abdominis muscles.   MACRO: Julianne Meneses discussed the significance and urgency of this critical finding by secure chat with Dionisio Salinas and AILYN PANG on 4/24/2024 at 11:40 am.  (**-RCF-**) Findings:  See findings.   Signed by: Julianne Meneses 4/24/2024 11:41 AM Dictation workstation:   [id number]    ECG 12 Lead    Result Date: 4/24/2024  Sinus tachycardia Minimal voltage criteria for LVH, may be normal variant ( Sokolow-Busch ) Borderline ECG No previous ECGs available        Assessment/Plan     Plan:  - Admit to medicine  - NPO  - Vanc/Zosyn  - IVF  - OR for ex-lap and possible bowel resection. Patient updated on POC.    Discussed with Dr Salinas     I spent 35 minutes in the professional and overall care of this patient.      Stephany Vallejo, APRN-CNP

## 2024-04-24 NOTE — PROGRESS NOTES
Transitional Care Coordination Progress Note:  Plan per Medical/Surgical team: treatment of perforated sigmoid diverticulitis with smaller abscesses in the abdomen and pelvis with IV ATB, IV Dilaudid, NPO, surgery consult to OR for ex-lap and possible bowel resection.   Status: Inpatient  Payor source: generic commercial   Discharge disposition: Home with father   Potential Barriers: lactate 2.3, , WBC 11.7  ADOD: 4/26/2024  RUEL Escalona RN, BSN Transitional Care Coordinator ED# [phone]      04/24/24 1331   Discharge Planning   Living Arrangements Parent   Support Systems Parent   Assistance Needed surgery, GI work up, ATB plan   Type of Residence Private residence   Number of Stairs to Enter Residence 1   Number of Stairs Within Residence 2   Do you have animals or pets at home? No   Home or Post Acute Services None   Patient expects to be discharged to: Home with father   Does the patient need discharge transport arranged? Yes   RoundTrip coordination needed? Yes   Has discharge transport been arranged? No   Financial Resource Strain   How hard is it for you to pay for the very basics like food, housing, medical care, and heating? Not hard   Housing Stability   In the last 12 months, was there a time when you were not able to pay the mortgage or rent on time? N   In the last 12 months, how many places have you lived? 1   In the last 12 months, was there a time when you did not have a steady place to sleep or slept in a shelter (including now)? N   Transportation Needs   In the past 12 months, has lack of transportation kept you from medical appointments or from getting medications? no   In the past 12 months, has lack of transportation kept you from meetings, work, or from getting things needed for daily living? No

## 2024-04-24 NOTE — CONSULTS
"Reason For Consult  Abdominal pain    History Of Present Illness  James Ramirez is a 44 y.o. male presenting with lower abdominal pain that has been on-going for a week. Last meal was Saturday and had a few snacks on Sunday, but did not eat yesterday. Last bowel movement was this morning, liquid. Patient denies any N/V. Patient upset because he feels his job could have contributed to this as he does heavy lifting (50lbs bags of fertilizer).    CT obtained in ED c/f \"central intraabdominal abscess measuring 9.1 x 7.9 x 3.8 cm.. There are multiple dots of air around this larger abscess consistent with localized perforation and viscus perforation. This is likely perforated sigmoid diverticulitis. There are smaller abscesses in the abdomen and pelvis.\" WBC 11.7, lactate 2.2. Patient started on Vanc/Zosyn. Surgery was consulted for this.     Patient with PMHx: L inguinal hernia repair with mesh in 2009  and diverticulitis c/b hepatic abscess s/p drain in 2022.     Denies and etoh, tobacco and illicit drug use    Family History  No family history on file.     Allergies  Patient has no known allergies.    Review of Systems  12 point ROS negative unless stated above       Physical Exam  PE:  Constitutional: A&Ox3, calm and cooperative, NAD  Eyes: EOMI, clear sclera   Cardiovascular: Normal rate and regular rhythm. No murmurs  Respiratory/Thorax: CTAB, on RA  Gastrointestinal: Rigid abdomen below the umbilical region with TTP and distention.  Genitourinary: Voiding independently   Musculoskeletal: ROM intact, no joint swelling, normal strength  Extremities: No peripheral edema, contusions, or wounds  Neurological: A&Ox3, No focal deficits   Psychological: Appropriate mood and behavior       Last Recorded Vitals  Blood pressure (!) 137/93, pulse (!) 108, temperature 36.4 °C (97.5 °F), temperature source Oral, resp. rate 16, height 1.83 m (6' 0.05\"), weight 74.8 kg (165 lb), SpO2 99%.    Relevant Results  Results for orders " placed or performed during the hospital encounter of 04/24/24 (from the past 24 hour(s))   Lactate   Result Value Ref Range    Lactate 2.2 (H) 0.4 - 2.0 mmol/L   Lipase   Result Value Ref Range    Lipase <3 (L) 9 - 82 U/L   Comprehensive Metabolic Panel   Result Value Ref Range    Glucose 112 (H) 74 - 99 mg/dL    Sodium 132 (L) 136 - 145 mmol/L    Potassium 3.6 3.5 - 5.3 mmol/L    Chloride 93 (L) 98 - 107 mmol/L    Bicarbonate 24 21 - 32 mmol/L    Anion Gap 19 10 - 20 mmol/L    Urea Nitrogen 11 6 - 23 mg/dL    Creatinine 0.78 0.50 - 1.30 mg/dL    eGFR >90 >60 mL/min/1.73m*2    Calcium 9.2 8.6 - 10.3 mg/dL    Albumin 3.6 3.4 - 5.0 g/dL    Alkaline Phosphatase 73 33 - 120 U/L    Total Protein 8.4 (H) 6.4 - 8.2 g/dL    AST 7 (L) 9 - 39 U/L    Bilirubin, Total 0.9 0.0 - 1.2 mg/dL    ALT 7 (L) 10 - 52 U/L   CBC and Auto Differential   Result Value Ref Range    WBC 11.7 (H) 4.4 - 11.3 x10*3/uL    nRBC 0.0 0.0 - 0.0 /100 WBCs    RBC 4.61 4.50 - 5.90 x10*6/uL    Hemoglobin 11.7 (L) 13.5 - 17.5 g/dL    Hematocrit 36.0 (L) 41.0 - 52.0 %    MCV 78 (L) 80 - 100 fL    MCH 25.4 (L) 26.0 - 34.0 pg    MCHC 32.5 32.0 - 36.0 g/dL    RDW 13.0 11.5 - 14.5 %    Platelets 683 (H) 150 - 450 x10*3/uL    Neutrophils % 69.8 40.0 - 80.0 %    Immature Granulocytes %, Automated 0.5 0.0 - 0.9 %    Lymphocytes % 13.9 13.0 - 44.0 %    Monocytes % 15.3 2.0 - 10.0 %    Eosinophils % 0.2 0.0 - 6.0 %    Basophils % 0.3 0.0 - 2.0 %    Neutrophils Absolute 8.18 (H) 1.20 - 7.70 x10*3/uL    Immature Granulocytes Absolute, Automated 0.06 0.00 - 0.70 x10*3/uL    Lymphocytes Absolute 1.63 1.20 - 4.80 x10*3/uL    Monocytes Absolute 1.79 (H) 0.10 - 1.00 x10*3/uL    Eosinophils Absolute 0.02 0.00 - 0.70 x10*3/uL    Basophils Absolute 0.03 0.00 - 0.10 x10*3/uL   ECG 12 Lead   Result Value Ref Range    Ventricular Rate 116 BPM    Atrial Rate 116 BPM    CT Interval 130 ms    QRS Duration 80 ms    QT Interval 300 ms    QTC Calculation(Bazett) 417 ms    P Axis 67  degrees    R Axis 61 degrees    T Axis 39 degrees    QRS Count 19 beats    Q Onset 222 ms    P Onset 157 ms    P Offset 209 ms    T Offset 372 ms    QTC Fredericia 374 ms   Urinalysis with Reflex Culture and Microscopic   Result Value Ref Range    Color, Urine Yellow Light-Yellow, Yellow, Dark-Yellow    Appearance, Urine Clear Clear    Specific Gravity, Urine 1.047 (N) 1.005 - 1.035    pH, Urine 5.5 5.0, 5.5, 6.0, 6.5, 7.0, 7.5, 8.0    Protein, Urine 30 (1+) (A) NEGATIVE, 10 (TRACE), 20 (TRACE) mg/dL    Glucose, Urine Normal Normal mg/dL    Blood, Urine NEGATIVE NEGATIVE    Ketones, Urine 150 (4+) (A) NEGATIVE mg/dL    Bilirubin, Urine 0.5 (1+) (A) NEGATIVE    Urobilinogen, Urine 4 (2+) (A) Normal mg/dL    Nitrite, Urine NEGATIVE NEGATIVE    Leukocyte Esterase, Urine NEGATIVE NEGATIVE   Urinalysis Microscopic   Result Value Ref Range    WBC, Urine 1-5 1-5, NONE /HPF    RBC, Urine 1-2 NONE, 1-2, 3-5 /HPF    Mucus, Urine 3+ Reference range not established. /LPF   Lactate   Result Value Ref Range    Lactate 2.3 (H) 0.4 - 2.0 mmol/L     XR chest 1 view    Result Date: 4/24/2024  Interpreted By:  Joleen Pascual, STUDY: Chest, single AP view.   INDICATION: Signs/Symptoms:Pre-op.   COMPARISON: CT of the abdomen and pelvis study dated 04/24/2024.   ACCESSION NUMBER(S): [id number]   ORDERING CLINICIAN: MEHDI BROWN   FINDINGS: The cardiac silhouette size is within normal limits. There is no focal consolidation, edema or pneumothorax. No sizeable pleural effusion. Calcified granuloma noted in the right mid lung. No acute osseous abnormality.       1. No acute cardiopulmonary process.   MACRO: None.   Signed by: Joleen Pascual 4/24/2024 11:59 AM Dictation workstation:   [id number]    CT abdomen pelvis w IV contrast    Result Date: 4/24/2024  Interpreted By:  Julianne Meneses, STUDY: CT ABDOMEN PELVIS W IV CONTRAST;  4/24/2024 11:00 am   INDICATION: Signs/Symptoms:lower abdominal pain and swelling eval for possible  incarcerated hernia.   COMPARISON: None.   ACCESSION NUMBER(S): [id number]   ORDERING CLINICIAN: AILYN PANG   TECHNIQUE: CT of the abdomen and pelvis was performed.  85 mL Omnipaque 350   FINDINGS: LOWER CHEST: Images of the lung bases show no infiltrate or pleural fluid.   ABDOMEN:   LIVER: There is no hepatic mass.   BILE DUCTS: There is no intrahepatic, common hepatic or common bile ductal dilatation.   GALLBLADDER: The gallbladder is unremarkable.   PANCREAS: The pancreas is unremarkable.   SPLEEN: The spleen is unremarkable. There is no splenic mass or splenomegaly.   ADRENAL GLANDS: The adrenal glands are unremarkable.   KIDNEYS AND URETERS: The kidneys function symmetrically. The kidneys demonstrate no mass. There is no intrarenal calculus or hydronephrosis.     VESSELS: The abdominal and pelvic vessels are unremarkable.   PERITONEUM/RETROPERITONEUM/LYMPH NODES: There is no retroperitoneal or pelvic adenopathy.  There is no ascites.   ABDOMINAL WALL/BOWEL: There is thickening of the right and left rectus abdominis muscles. There is an abscess containing fluid and air in the anterior abdominal wall the in the midline between the rectus abdominis muscles. This measures 9.0 x 2.7 x 3.6 cm in longitudinal, transverse and AP dimensions respectively. There are multiple smaller dots of air in the subcutaneous fat. There is an abscess with an air-fluid level centrally in the pelvis measuring 9.1 x 7.9 x 3.8 cm in longitudinal, transverse and AP dimensions respectively. There are multiple dots of air in the peritoneal cavity around this larger abscess. Findings are consistent with a viscus perforation, likely extending into the anterior abdominal wall. There is a 2.0 x 0.8 cm abscess in the left side of the pelvis with smaller dots of air. There is a thickened loop of sigmoid colon and this is likely perforated sigmoid diverticulitis.   BONE AND SOFT TISSUE: There is no acute osseous finding.   There is no soft  tissue abnormality.       1. Central intraabdominal abscess measuring 9.1 x 7.9 x 3.8 cm.. There are multiple dots of air around this larger abscess consistent with localized perforation and viscus perforation. This is likely perforated sigmoid diverticulitis. There are smaller abscesses in the abdomen and pelvis.   2. This abscess extends into the anterior abdominal wall between the rectus abdominis muscles.   MACRO: Julianne Meneses discussed the significance and urgency of this critical finding by secure chat with Dionisio Salinas and AILYN PANG on 4/24/2024 at 11:40 am.  (**-RCF-**) Findings:  See findings.   Signed by: Julianne Meneses 4/24/2024 11:41 AM Dictation workstation:   [id number]    ECG 12 Lead    Result Date: 4/24/2024  Sinus tachycardia Minimal voltage criteria for LVH, may be normal variant ( Sokolow-Busch ) Borderline ECG No previous ECGs available        Assessment/Plan     Plan:  - Admit to medicine  - NPO  - Vanc/Zosyn  - IVF  - OR for ex-lap and possible bowel resection. Patient updated on POC.    Discussed with Dr Salinas     I spent 35 minutes in the professional and overall care of this patient.      Stephany Vallejo, APRN-CNP

## 2024-04-24 NOTE — PROGRESS NOTES
04/24/24 1331   [facility] Disability Status   Are you deaf or do you have serious difficulty hearing? N   Are you blind or do you have serious difficulty seeing, even when wearing glasses? N   Because of a physical, mental, or emotional condition, do you have serious difficulty concentrating, remembering, or making decisions? (5 years old or older) N   Do you have serious difficulty walking or climbing stairs? N   Do you have serious difficulty dressing or bathing? N   Because of a physical, mental, or emotional condition, do you have serious difficulty doing errands alone such as visiting the doctor? N

## 2024-04-24 NOTE — ANESTHESIA PROCEDURE NOTES
Airway  Date/Time: 4/24/2024 3:57 PM  Urgency: elective    Airway not difficult    Staffing  Performed: CRNA   Authorized by: Cristhian Head MD    Performed by: NAEEM Schafer-JANNA  Patient location during procedure: OR    Indications and Patient Condition  Indications for airway management: anesthesia  Spontaneous ventilation: present  Sedation level: deep  Preoxygenated: yes  Patient position: sniffing  MILS maintained throughout  Mask difficulty assessment: 0 - not attempted    Final Airway Details  Final airway type: endotracheal airway      Successful airway: ETT  Cuffed: yes   Successful intubation technique: direct laryngoscopy  Facilitating devices/methods: intubating stylet  Endotracheal tube insertion site: oral  Blade: Dashawn  Blade size: #3  ETT size (mm): 7.0  Cormack-Lehane Classification: grade I - full view of glottis  Placement verified by: capnometry   Measured from: lips  ETT to lips (cm): 21  Number of attempts at approach: 1

## 2024-04-24 NOTE — PROGRESS NOTES
Home with father      04/24/24 4425   Current Planned Discharge Disposition   Current Planned Discharge Disposition Home

## 2024-04-24 NOTE — OP NOTE
Exploration Laparotomy, Left colon resection, drainage of abdominal abcess Operative Note     Date: 2024  OR Location: [facility]    Name: Jaems Ramirez, : 1980, Age: 44 y.o., MRN: [id number], Sex: male    Diagnosis  Pre-op Diagnosis     * Bowel perforation (Multi) [K63.1] Post-op Diagnosis     * Bowel perforation (Multi) [K63.1]     Procedures  Exploratory laparotomy, drainage of abdominal wall abscess, partial colon resection with diverting end colostomy    Surgeons      * Dionisio Salinas - Primary    Resident/Fellow/Other Assistant:  Surgeons and Role:  * No surgeons found with a matching role *    Procedure Summary  Anesthesia: General  ASA: II  Anesthesia Staff: Anesthesiologist: Armando Arauz MD  CRNA: NAEEM Schafer-CRNA  C-AA: ITZEL Avery  Estimated Blood Loss: 200mL  Intra-op Medications:   Administrations occurring from 1600 to 1715 on 24:   Medication Name Total Dose   sodium chloride 0.9 % irrigation solution 1,000 mL              Anesthesia Record               Intraprocedure I/O Totals          Intake    Esmolol Drip 0.00 mL    The total shown is the total volume documented since Anesthesia Start was filed.    LR infusion 1000.00 mL    Total Intake 1000 mL       Output    Urine 50 mL    Est. Blood Loss 50 mL    Total Output 100 mL       Net    Net Volume 900 mL          Specimen:   ID Type Source Tests Collected by Time   1 : Colon Tissue COLON - ASCENDING BIOPSY SURGICAL PATHOLOGY EXAM Dionisio Salinas MD 2024 1734   A : ABDOMINAL WALL ABSCESS Swab ABSCESS TISSUE/WOUND CULTURE/SMEAR Dionisio Salinas MD 2024 1619        Staff:   Circulator: Sherri Mackey RN; Ayanna Cabrera RN  Relief Circulator: Irma Javier RN  Relief Scrub: Nita Fritz  Scrub Person: Ileana Pollack; Brady Quiroga         Drains and/or Catheters:   Closed/Suction Drain LLQ (Active)       Open Drain 2 Inferior (Active)       Urethral Catheter Double-lumen 16 Fr. (Active)        Tourniquet Times:         Implants:     Findings: This appeared to be a colon cancer that had eroded into the abdominal wall and was densely adherent to the bladder.  Damage control operation was performed with washout placement of drains and diverting end colostomy    Indications: James Ramirez is an 44 y.o. male who is having surgery for Bowel perforation (Multi) [K63.1].     The patient was seen in the preoperative area. The risks, benefits, complications, treatment options, non-operative alternatives, expected recovery and outcomes were discussed with the patient. The possibilities of reaction to medication, pulmonary aspiration, injury to surrounding structures, bleeding, recurrent infection, the need for additional procedures, failure to diagnose a condition, and creating a complication requiring transfusion or operation were discussed with the patient. The patient concurred with the proposed plan, giving informed consent.  The site of surgery was properly noted/marked if necessary per policy. The patient has been actively warmed in preoperative area. Preoperative antibiotics have been ordered and given within 2 hours of incision. Venous thrombosis prophylaxis have been ordered including bilateral sequential compression devices    Procedure Details: Patient came in with severe abdominal pain and signs of sepsis, systemic toxicity.  CT scan showed evidence of bowel perforation with abscess in pelvis and eroding into abdominal wall.  He had peritoneal signs on exam and exploration was advised.  He had a history of a diagnosis of diverticulitis 2 years ago complicated by liver abscesses that was treated with percutaneous drainage and a longer course of antibiotics.  It was advised he had colonoscopy after that but he did not follow-up with those physicians.  I told him that we will try to achieve source control as primary goal today.  I told him it was likely he may need a diverting stoma depending on  intraoperative findings.    He has brought to the OR placed supine.  Timeout was performed to confirm patient and procedure.  Antibiotics were given general anesthesia ministered and endotracheal tube.  Batista catheter was placed.  We prepped and draped the abdominal wall sterilely.  I made a long vertical midline incision starting above the umbilicus and carrying it down to the pubis.  Above the umbilicus I was able to enter the peritoneal cavity.  We encountered dense adhesions of bowel to the anterior abdominal wall below the umbilicus and as we opened up the rest of this incision we encountered our purulent and feculent abdominal wall infectious process involving the rectus muscle itself.  We were able to sweep this loop of what appeared to be sigmoid colon mostly out of the way.  We opened up the rest of the incision down to the pubic bone.  We then set up for retraction and exposure and once this was done it became suspiciously clear that this was not just diverticulitis but more of a malignant process.  There is complete fusion of the rectosigmoid with back wall of bladder.  There was evidence of enlarged lymph nodes in the sigmoid mesentery.  I scrubbed out at this point took another look at the CAT scan to correlate with intraoperative findings.  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  I made an intraoperative consultation with one of our colorectal surgeons who agreed with my plan for simple damage control and diversion.  I made a window in the rectosigmoid mesentery and divided the superior hemorrhoidal vessels with the LigaSure device and then divided the rest of the mesentery up to the healthier appearing descending colon and I divided there with the AUBREE 75 mm blue load stapler.  I mobilized the rest of the descending colon from white line of Toldt up to and around the  splenic flexure with combination of LigaSure and cautery.  This gave me plenty of mobilization the proximal colon.  As for the distal colon I divided the mesentery up to this area at the sacral promontory where it appeared densely adherent to the bladder anteriorly and then I just amputated the rectum at this point making sure what was left at least had some vascularity to it.  This specimen was sent off the field as rectosigmoid resection mainly just for diagnostic purposes.  I then oversewed the rectal stump with a series of 2-0 PDS sutures to close it.  We then changed our gloves and irrigated with 6 L of saline was clear.  We assessed for hemostasis.  I then placed 210 DUSTY drains in the pelvis and brought a mild laterally and sutured to the skin with 3-0 nylon sutures.  I then did a sponge and needle count close the midline abdominal wall with running looped #1 PDS.  Below the umbilicus the abdominal wall closure was tenuous at best given the infectious process.  So I closed the upper incision skin with staples but the lower part I left open and placed a wound VAC.  I then matured my stoma in the left upper quadrant with a series of interrupted 3-0 chromic sutures full-thickness of bowel to dermis circumferentially.  This gave us nice healthy stoma appliance was placed.  Patient then ultimately was extubated and transferred to recovery room satisfactory condition      Complications:  None; patient tolerated the procedure well.    Disposition: PACU - hemodynamically stable.  Condition: stable         Additional Details:     Attending Attestation: I was present for the entire procedure.    Dionisio Salinas  Phone Number: [phone]

## 2024-04-24 NOTE — ED PROVIDER NOTES
HPI   Chief Complaint   Patient presents with    OTHER       HPI  HISTORY OF PRESENT ILLNESS:  44 y.o. male presenting to the ED with complaint of abdominal pain worsening over the past 1 week.  He reports pain in the lower abdomen as well as distention and swelling that has been gradually worsening.  States that he thought it would just go away but it has not.  He does lift heavy boxes at work and wonders if this may be related.  Pain does not radiate into the back or flanks, no radiation into the groin or testicles, no testicular pain or swelling.  He denies nausea or vomiting.  Denies constipation.  States his last bowel movement was diarrhea this morning.  No melena hematochezia.  Denies any urinary symptoms, no dysuria, hematuria, urinary urgency or frequency.  Denies chest pain or shortness of breath.  No palpitations.  No dizziness lightheadedness.  Patient states that he recently quit drinking alcohol about 3 months ago.  He does have a history of a prior inguinal hernia repair, also has a history of diverticulitis complicated by hepatic abscess.  Denies any other past medical history.  No other complaints or symptoms wise.    12 point review of systems was performed and is negative unless otherwise specified in HPI.    PMH:  inguinal hernia repair, hx diverticulitis complicated by hepatic abscess.  Family history: noncontributory  Social history: non smoker, former ETOH, no illicit substances  No past medical history on file.      Abnormal Labs Reviewed   CBC WITH AUTO DIFFERENTIAL - Abnormal; Notable for the following components:       Result Value    WBC 11.7 (*)     Hemoglobin 11.7 (*)     Hematocrit 36.0 (*)     MCV 78 (*)     MCH 25.4 (*)     Platelets 683 (*)     Neutrophils Absolute 8.18 (*)     Monocytes Absolute 1.79 (*)     All other components within normal limits      CT abdomen pelvis w IV contrast    (Results Pending)               No data recorded                   Patient History   No past  medical history on file.  No past surgical history on file.  No family history on file.  Social History     Tobacco Use    Smoking status: Not on file    Smokeless tobacco: Not on file   Substance Use Topics    Alcohol use: Not on file    Drug use: Not on file       Physical Exam   ED Triage Vitals [04/24/24 0858]   Temperature Heart Rate Respirations BP   36.4 °C (97.5 °F) (!) 125 17 (!) 151/93      Pulse Ox Temp Source Heart Rate Source Patient Position   100 % Oral -- --      BP Location FiO2 (%)     -- --       Physical Exam  Constitutional:       General: He is not in acute distress.     Appearance: He is not toxic-appearing.   HENT:      Head: Normocephalic and atraumatic.      Mouth/Throat:      Mouth: Mucous membranes are moist.   Eyes:      General: No scleral icterus.     Extraocular Movements: Extraocular movements intact.      Pupils: Pupils are equal, round, and reactive to light.   Cardiovascular:      Rate and Rhythm: Normal rate and regular rhythm.      Pulses: Normal pulses.   Pulmonary:      Effort: Pulmonary effort is normal. No respiratory distress.      Breath sounds: Normal breath sounds.   Abdominal:      General: There is distension.      Palpations: Abdomen is soft. There is mass.      Tenderness: There is abdominal tenderness. There is no guarding.      Comments: + Distention and tenderness in the lower abdomen below the umbilicus, somewhat firm and masslike, not reducible, possibly hernia.  No rigidity.  No pulsatile mass.   Musculoskeletal:         General: Normal range of motion.      Cervical back: Normal range of motion and neck supple. No rigidity.      Right lower leg: No edema.      Left lower leg: No edema.   Skin:     General: Skin is warm and dry.      Capillary Refill: Capillary refill takes less than 2 seconds.   Neurological:      General: No focal deficit present.      Mental Status: He is alert and oriented to person, place, and time.      Cranial Nerves: No cranial nerve  deficit.      Coordination: Coordination normal.      Gait: Gait normal.   Psychiatric:         Mood and Affect: Mood normal.         Behavior: Behavior normal.         Judgment: Judgment normal.         ED Course & MDM   ED Course as of 04/24/24 1446   Wed Apr 24, 2024   0932 Ice pack applied to possible hernia. [EH]   1115 Reviewed CT images, called surgical team. []   1300 09:02 12 lead EKG interpreted by myself and my ED attending reveals sinus rhythm with a rate of 116 beats per minute.  LVH changes. Tall T waves. Early repolarization present. No acute ischemic changes identified.  [EH]      ED Course User Index  [EH] Chelsie Alexander PA-C         Diagnoses as of 04/24/24 1446   Bowel perforation (Multi)   Intra-abdominal abscess (Multi)       Medical Decision Making  ED course / MDM     Summary:  Patient presented with lower abdominal pain and distention worsening over the past 1 week.  Tachycardic in triage at 125, mildly hypertensive, 100% on room air.  Overall well-appearing, alert and oriented, ambulates unassisted, no acute distress.  Lungs clear to auscultation, heart regular rhythm.  On exam, there is firm masslike distention of the lower abdomen below the umbilicus, question possible hernia, tender, no rigidity.  No pulsatile mass.  No pain or swelling of the groin or testicles.  No CVA tenderness. Patient case discussed with ED attending Dr. Watts, who also saw and evaluated the patient. IV established, labs drawn. Labs are notable for a leukocytosis of 11.7, hemoglobin 11.7, minor electrolyte abnormalities, normal kidney function, normal lipase.  UA noninfected.  Lactate mildly elevated initially at 2.2.  Blood cultures also drawn due to tachycardia.  CT scan very abnormal on my initial evaluation of images, general surgery team called at this time and consult order was placed.  Started on IV Zosyn and vancomycin.  CT read shows central intra-abdominal abscess consistent with localized perforation  and viscus perforation, likely perforated sigmoid diverticulitis.  Surgery team was called again, and results discussed with the patient, he understands the need for admission.  His pain did improve after analgesics, and heart rate did come down to the 90s after IV fluids.  He is in agreement with the plan for admission. Accepted for inpatient status.      Impression:  1. See diagnosis     Disposition: Admission    Patient seen and discussed with Dr. Watts    This note has been transcribed using voice recognition and may contain grammatical errors, misplaced words, incorrect words, incorrect phrases or other errors.   Procedure  Procedures     Chelsie Alexander PA-C  04/24/24 3467

## 2024-04-24 NOTE — ANESTHESIA POSTPROCEDURE EVALUATION
Patient: James Ramirez    Procedure Summary       Date: 04/24/24 Room / Location: [facility] 06 / [facility]    Anesthesia Start: 1549 Anesthesia Stop:     Procedure: Exploration Laparotomy Diagnosis:       Bowel perforation (Multi)      (Bowel perforation (Multi) [K63.1])    Surgeons: Dionisio Salinas MD Responsible Provider: Armando Arauz MD    Anesthesia Type: general ASA Status: 2            Anesthesia Type: general    Vitals Value Taken Time   /60 04/24/24 1738   Temp 36.8 04/24/24 1738   Pulse 85 04/24/24 1738   Resp 14 04/24/24 1738   SpO2 100 04/24/24 1738       Anesthesia Post Evaluation    Patient location during evaluation: PACU  Patient participation: complete - patient participated  Level of consciousness: awake  Pain score: 0  Pain management: adequate  Multimodal analgesia pain management approach  Airway patency: patent  Cardiovascular status: acceptable  Respiratory status: acceptable  Hydration status: acceptable  Postoperative Nausea and Vomiting: none        No notable events documented.

## 2024-04-24 NOTE — PERIOPERATIVE NURSING NOTE
Procedure:left colon resection, colostomy, drainage of abd wall abscess with wound vac placement, pt now has right #1 20ml out and left #2  tony with 30 ml out to bulb suction. Abd incision to wound vac. Colostomy without drainage. Jordan to cd 125ml out  Anesthesia type   general  Nerve block (if applicable):na  Estimated blood loss (EBL) in OR:  25ml  Orientation/mental status:  a&o x3  IV site(s),  right upper forearm lr at 100/hr  (did receive 1200 in or also 250 albumin in or.)  PO status (last oral intake):  sips water  O2 requirements  room air  Current pain level  pain 3- tylenol at 1919, c/o increased pain with movement 7/10 0.5 dilaudid at 1922  Antibiotics on your schedule, none given in or or pacu.    Last void/Jordan in place:  jordan   Equipment sent with patient (i.e. Polar Cube, TENs unit, walker, crutches):none  SCDs on (yes/no):yes  Belongings sent with patient:in locker of 603  Emergency contact pt julio cesar martinez  [phone]

## 2024-04-24 NOTE — PROGRESS NOTES
Pharmacy Medication History Review    James Ramirez is a 44 y.o. male admitted for Bowel perforation (Multi). Pharmacy reviewed the patient's [id number] medications and allergies for accuracy.    The list below reflectives the updated PTA list. Please review each medication in order reconciliation for additional clarification and justification.  Prior to Admission medications    Not on File        The list below reflectives the updated allergy list. Please review each documented allergy for additional clarification and justification.  Allergies  Reviewed by Louise Yen RN on 4/24/2024   No Known Allergies         Below are additional concerns with the patient's PTA list.  None      Per patient. No meds or OTC items    Selene Borrero

## 2024-04-24 NOTE — INTERVAL H&P NOTE
H&P reviewed. The patient was examined and there are no changes to the H&P.    Surgical consult note has all the components of an H&P  Briefly patient with several days of worsening lower abdominal pain.  CT scan shows evidence of a bowel perforation with abscess possibly stool collections within the rectus muscle of the abdominal wall.  I have advised surgical intervention for source control.  This would involve laparotomy, washout, possible colon resection with diverting stoma.  Patient with a history of prior diverticulitis complicated by liver abscesses that was treated conservatively in 2022.  He never got his follow-up colonoscopy.  I reviewed the situation with Mr. Ramirez in detail and presented the surgical treatment plan which he has consented to.    jerome

## 2024-04-25 LAB
ALBUMIN SERPL BCP-MCNC: 2.9 G/DL (ref 3.4–5)
ALP SERPL-CCNC: 63 U/L (ref 33–120)
ALT SERPL W P-5'-P-CCNC: 5 U/L (ref 10–52)
ANION GAP SERPL CALC-SCNC: 13 MMOL/L (ref 10–20)
AST SERPL W P-5'-P-CCNC: 14 U/L (ref 9–39)
BILIRUB SERPL-MCNC: 0.7 MG/DL (ref 0–1.2)
BUN SERPL-MCNC: 9 MG/DL (ref 6–23)
CALCIUM SERPL-MCNC: 8.6 MG/DL (ref 8.6–10.3)
CHLORIDE SERPL-SCNC: 98 MMOL/L (ref 98–107)
CO2 SERPL-SCNC: 25 MMOL/L (ref 21–32)
CREAT SERPL-MCNC: 0.62 MG/DL (ref 0.5–1.3)
EGFRCR SERPLBLD CKD-EPI 2021: >90 ML/MIN/1.73M*2
ERYTHROCYTE [DISTWIDTH] IN BLOOD BY AUTOMATED COUNT: 13.2 % (ref 11.5–14.5)
GLUCOSE SERPL-MCNC: 235 MG/DL (ref 74–99)
HCT VFR BLD AUTO: 30.3 % (ref 41–52)
HGB BLD-MCNC: 9.6 G/DL (ref 13.5–17.5)
MCH RBC QN AUTO: 25.1 PG (ref 26–34)
MCHC RBC AUTO-ENTMCNC: 31.7 G/DL (ref 32–36)
MCV RBC AUTO: 79 FL (ref 80–100)
NRBC BLD-RTO: 0 /100 WBCS (ref 0–0)
PLATELET # BLD AUTO: 600 X10*3/UL (ref 150–450)
POTASSIUM SERPL-SCNC: 4.2 MMOL/L (ref 3.5–5.3)
PROT SERPL-MCNC: 6.2 G/DL (ref 6.4–8.2)
RBC # BLD AUTO: 3.82 X10*6/UL (ref 4.5–5.9)
SODIUM SERPL-SCNC: 132 MMOL/L (ref 136–145)
WBC # BLD AUTO: 16.5 X10*3/UL (ref 4.4–11.3)

## 2024-04-25 PROCEDURE — C9113 INJ PANTOPRAZOLE SODIUM, VIA: HCPCS | Performed by: INTERNAL MEDICINE

## 2024-04-25 PROCEDURE — 85027 COMPLETE CBC AUTOMATED: CPT

## 2024-04-25 PROCEDURE — 2500000004 HC RX 250 GENERAL PHARMACY W/ HCPCS (ALT 636 FOR OP/ED): Performed by: INTERNAL MEDICINE

## 2024-04-25 PROCEDURE — 1200000002 HC GENERAL ROOM WITH TELEMETRY DAILY

## 2024-04-25 PROCEDURE — 2500000001 HC RX 250 WO HCPCS SELF ADMINISTERED DRUGS (ALT 637 FOR MEDICARE OP): Performed by: INTERNAL MEDICINE

## 2024-04-25 PROCEDURE — 84075 ASSAY ALKALINE PHOSPHATASE: CPT

## 2024-04-25 PROCEDURE — 97161 PT EVAL LOW COMPLEX 20 MIN: CPT | Mod: GP

## 2024-04-25 PROCEDURE — 2500000004 HC RX 250 GENERAL PHARMACY W/ HCPCS (ALT 636 FOR OP/ED): Performed by: SURGERY

## 2024-04-25 PROCEDURE — 36415 COLL VENOUS BLD VENIPUNCTURE: CPT

## 2024-04-25 PROCEDURE — 9420000001 HC RT PATIENT EDUCATION 5 MIN

## 2024-04-25 PROCEDURE — 99232 SBSQ HOSP IP/OBS MODERATE 35: CPT | Performed by: INTERNAL MEDICINE

## 2024-04-25 RX ADMIN — DOCUSATE SODIUM 100 MG: 100 CAPSULE, LIQUID FILLED ORAL at 00:07

## 2024-04-25 RX ADMIN — PIPERACILLIN SODIUM AND TAZOBACTAM SODIUM 3.38 G: 3; .375 INJECTION, SOLUTION INTRAVENOUS at 11:43

## 2024-04-25 RX ADMIN — VANCOMYCIN HYDROCHLORIDE 1000 MG: 1 INJECTION, SOLUTION INTRAVENOUS at 02:16

## 2024-04-25 RX ADMIN — HEPARIN SODIUM 5000 UNITS: 5000 INJECTION INTRAVENOUS; SUBCUTANEOUS at 22:28

## 2024-04-25 RX ADMIN — PANTOPRAZOLE SODIUM 40 MG: 40 INJECTION, POWDER, FOR SOLUTION INTRAVENOUS at 06:26

## 2024-04-25 RX ADMIN — VANCOMYCIN HYDROCHLORIDE 1000 MG: 1 INJECTION, SOLUTION INTRAVENOUS at 12:38

## 2024-04-25 RX ADMIN — POTASSIUM CHLORIDE, DEXTROSE MONOHYDRATE AND SODIUM CHLORIDE 100 ML/HR: 150; 5; 450 INJECTION, SOLUTION INTRAVENOUS at 00:06

## 2024-04-25 RX ADMIN — PIPERACILLIN SODIUM AND TAZOBACTAM SODIUM 3.38 G: 3; .375 INJECTION, SOLUTION INTRAVENOUS at 20:15

## 2024-04-25 RX ADMIN — PIPERACILLIN SODIUM AND TAZOBACTAM SODIUM 3.38 G: 3; .375 INJECTION, SOLUTION INTRAVENOUS at 06:26

## 2024-04-25 RX ADMIN — Medication: at 22:42

## 2024-04-25 RX ADMIN — PIPERACILLIN SODIUM AND TAZOBACTAM SODIUM 3.38 G: 3; .375 INJECTION, SOLUTION INTRAVENOUS at 01:20

## 2024-04-25 RX ADMIN — DOCUSATE SODIUM 100 MG: 100 CAPSULE, LIQUID FILLED ORAL at 20:15

## 2024-04-25 RX ADMIN — HEPARIN SODIUM 5000 UNITS: 5000 INJECTION INTRAVENOUS; SUBCUTANEOUS at 14:02

## 2024-04-25 RX ADMIN — HEPARIN SODIUM 5000 UNITS: 5000 INJECTION INTRAVENOUS; SUBCUTANEOUS at 08:42

## 2024-04-25 ASSESSMENT — COGNITIVE AND FUNCTIONAL STATUS - GENERAL
DAILY ACTIVITIY SCORE: 24
MOVING TO AND FROM BED TO CHAIR: A LITTLE
CLIMB 3 TO 5 STEPS WITH RAILING: A LITTLE
STANDING UP FROM CHAIR USING ARMS: A LITTLE
WALKING IN HOSPITAL ROOM: A LITTLE
CLIMB 3 TO 5 STEPS WITH RAILING: A LITTLE
MOBILITY SCORE: 19
MOBILITY SCORE: 19
TURNING FROM BACK TO SIDE WHILE IN FLAT BAD: A LITTLE
DAILY ACTIVITIY SCORE: 23
TURNING FROM BACK TO SIDE WHILE IN FLAT BAD: A LITTLE
MOVING TO AND FROM BED TO CHAIR: A LITTLE
HELP NEEDED FOR BATHING: A LITTLE
STANDING UP FROM CHAIR USING ARMS: A LITTLE
MOBILITY SCORE: 24
WALKING IN HOSPITAL ROOM: A LITTLE

## 2024-04-25 ASSESSMENT — PAIN SCALES - GENERAL
PAINLEVEL_OUTOF10: 0 - NO PAIN
PAINLEVEL_OUTOF10: 3
PAINLEVEL_OUTOF10: 5 - MODERATE PAIN
PAINLEVEL_OUTOF10: 5 - MODERATE PAIN

## 2024-04-25 ASSESSMENT — PAIN - FUNCTIONAL ASSESSMENT
PAIN_FUNCTIONAL_ASSESSMENT: 0-10

## 2024-04-25 ASSESSMENT — ACTIVITIES OF DAILY LIVING (ADL): ADL_ASSISTANCE: INDEPENDENT

## 2024-04-25 NOTE — PROGRESS NOTES
Physical Therapy    Physical Therapy Evaluation    Patient Name: James Ramirez  MRN: [id number]  Today's Date: 4/25/2024   Time Calculation  Start Time: 1148  Stop Time: 1212  Time Calculation (min): 24 min    Assessment/Plan   PT Assessment  PT Assessment Results: Decreased endurance, Impaired balance, Decreased mobility, Decreased skin integrity, Pain  Rehab Prognosis: Excellent  Barriers to Discharge: none for PT  Evaluation/Treatment Tolerance:  (Treatment limited to unresolvin lightheadedness.)  Medical Staff Made Aware: Yes  Strengths: Ability to acquire knowledge, Housing layout, Premorbid level of function  Barriers to Participation: Attitude of self  End of Session Communication: Bedside nurse  Assessment Comment: Pt is now presenting with decreased functional mobility associated with recent surgical procedure.  Pt will benefit from continued PT to address functional limitations and reduce risk of immobility and falls.  End of Session Patient Position: Bed, 3 rail up, Alarm on  IP OR SWING BED PT PLAN  Inpatient or Swing Bed: Inpatient  PT Plan  Treatment/Interventions: Bed mobility, Transfer training, Gait training, Stair training, Balance training, Neuromuscular re-education, Endurance training, Therapeutic activity  PT Plan: Skilled PT  PT Frequency: 3 times per week  PT Discharge Recommendations: Low intensity level of continued care  PT Recommended Transfer Status: Assist x1  PT - OK to Discharge: Yes (PT POC established)      Subjective   General Visit Information:  General  Reason for Referral: Pt admitted with abdominal pain, found to have an abscess and bowel perforation, now s/p ex lap.  Past Medical History Relevant to Rehab: History reviewed. No pertinent past medical history.  Past Surgical History:   Procedure Laterality Date    HERNIA REPAIR      OTHER SURGICAL HISTORY         Family/Caregiver Present: No  Prior to Session Communication: Bedside nurse  Patient Position Received: Bed, 3 rail up,  Alarm on  Preferred Learning Style: auditory, kinesthetic  General Comment: Pt agreeable to PT eval and OOB. However, pt reports lightheadedness that was not resolving, and requested back to supine.  Home Living:  Home Living  Type of Home: House  Lives With:  (father)  Home Adaptive Equipment: None  Home Layout: One level  Home Access: Stairs to enter without rails  Entrance Stairs-Rails: None  Entrance Stairs-Number of Steps: 3  Prior Level of Function:  Prior Function Per Pt/Caregiver Report  Level of Johnson: Independent with ADLs and functional transfers, Independent with homemaking with ambulation  ADL Assistance: Independent  Homemaking Assistance: Independent  Ambulatory Assistance: Independent  Precautions:  Precautions  Medical Precautions: Fall precautions  Post-Surgical Precautions: Abdominal surgery precautions  Precautions Comment: wound vac, DUSTY drain x 2, PCA pump, IV, telemetry  Vital Signs:  Vital Signs  BP: 125/82  BP Location: Left arm  BP Method: Automatic  Patient Position: Lying    Objective   Pain:  Pain Assessment  Pain Assessment: 0-10  Pain Score: 5 - Moderate pain  Pain Type: Surgical pain  Pain Location: Abdomen  Cognition:  Cognition  Overall Cognitive Status: Within Functional Limits  Insight: Within function limits  Impulsive: Within functional limits    General Assessments:   Activity Tolerance  Endurance: Tolerates less than 10 min exercise, no significant change in vital signs  Activity Tolerance Comments: c/o unresolving lightheadedness, while sitting EOB    Sensation  Light Touch: No apparent deficits    Strength  Strength Comments: WFL  Strength  Strength Comments: WFL    Perception  Inattention/Neglect: Appears intact  Initiation: Appears intact  Motor Planning: Appears intact    Coordination  Movements are Fluid and Coordinated: Yes    Static Sitting Balance  Static Sitting-Balance Support: Feet supported, Bilateral upper extremity supported  Static Sitting-Level of  Assistance: Distant supervision  Static Sitting-Comment/Number of Minutes: 4  Dynamic Sitting Balance  Dynamic Sitting-Balance Support:  (alternating B LAQ, AP to decrease lightheadedness)  Dynamic Sitting-Balance: Trunk control activities  Dynamic Sitting-Comments: SBA x 1 minute  Functional Assessments:  Bed Mobility  Bed Mobility: Yes  Bed Mobility 1  Bed Mobility 1: Rolling left, Side lying left to sit  Level of Assistance 1: Contact guard, Minimal verbal cues, Minimal tactile cues  Bed Mobility Comments 1: Pt educated in bed mobility technique moving supine->sit via logroll; pt requires minimal VC's for technique and proper UE placements for upper body initiate rolling and to use R UE to reach over for opposite bedrail, and to use BUE to push up into sitting from sidelying.  Bed Mobility 2  Bed Mobility  2: Log roll, Sitting to supine  Level of Assistance 2: Contact guard, Minimal verbal cues, Minimal tactile cues  Bed Mobility Comments 2: Pt educated on reversal of OOB technique using logroll.  Bed Mobility 3  Bed Mobility 3:  (bridging to don sweatpants)  Level of Assistance 3: Modified independent    Transfers  Transfer: No (Pt declining to attempt STS. Pt was able to scoot up along EOB x 2' with SBA.)    Outcome Measures:  [facility] Basic Mobility  Turning from your back to your side while in a flat bed without using bedrails: None  Moving from lying on your back to sitting on the side of a flat bed without using bedrails: A little  Moving to and from bed to chair (including a wheelchair): A little  Standing up from a chair using your arms (e.g. wheelchair or bedside chair): A little  To walk in hospital room: A little  Climbing 3-5 steps with railing: A little  Basic Mobility - Total Score: 19    Encounter Problems       Encounter Problems (Active)       Balance       STG - Maintains dynamic standing balance without upper extremity support x 10 minutes with indep       Start:  04/25/24    Expected End:   05/09/24       INTERVENTIONS:  1. Practice standing with minimal support.  2. Educate patient about standing tolerance.  3. Educate patient about independence with gait, transfers, and ADL's.  4. Educate patient about use of assistive device.  5. Educate patient about self-directed care.            Mobility       STG - Patient will ambulate >250' indep       Start:  04/25/24    Expected End:  05/09/24            STG - Patient will ascend and descend 3 steps without HR indep       Start:  04/25/24    Expected End:  05/09/24               PT Transfers       STG - Transfer from bed to chair with indep       Start:  04/25/24    Expected End:  05/09/24            STG - Patient to transfer to and from sit to supine via logroll mod indep       Start:  04/25/24    Expected End:  05/09/24            STG - Patient will transfer sit to and from stand indep       Start:  04/25/24    Expected End:  05/09/24               Pain - Adult              Education Documentation  Precautions, taught by Lizzeth Saxena, PT at 4/25/2024  1:10 PM.  Learner: Patient  Readiness: Acceptance  Method: Explanation  Response: Verbalizes Understanding, Demonstrated Understanding    Body Mechanics, taught by Lizzeth Saxena, PT at 4/25/2024  1:10 PM.  Learner: Patient  Readiness: Acceptance  Method: Explanation  Response: Verbalizes Understanding, Demonstrated Understanding    Mobility Training, taught by Lizzeth Saxena, PT at 4/25/2024  1:10 PM.  Learner: Patient  Readiness: Acceptance  Method: Explanation  Response: Verbalizes Understanding, Demonstrated Understanding    Education Comments  No comments found.

## 2024-04-25 NOTE — CARE PLAN
Problem: Nutrition  Goal: Less than 5 days NPO/clear liquids  Outcome: Progressing  Goal: Oral intake greater than 50%  Outcome: Progressing  Goal: Oral intake greater 75%  Outcome: Progressing  Goal: Consume prescribed supplement  Outcome: Progressing  Goal: Adequate PO fluid intake  Outcome: Progressing  Goal: Nutrition support goals are met within 48 hrs  Outcome: Progressing  Goal: Nutrition support is meeting 75% of nutrient needs  Outcome: Progressing  Goal: Tube feed tolerance  Outcome: Progressing  Goal: BG  mg/dL  Outcome: Progressing  Goal: Lab values WNL  Outcome: Progressing  Goal: Electrolytes WNL  Outcome: Progressing  Goal: Promote healing  Outcome: Progressing  Goal: Maintain stable weight  Outcome: Progressing  Goal: Reduce weight from edema/fluid  Outcome: Progressing  Goal: Gradual weight gain  Outcome: Progressing  Goal: Improve ostomy output  Outcome: Progressing     Problem: Pain - Adult  Goal: Verbalizes/displays adequate comfort level or baseline comfort level  Outcome: Progressing     Problem: Safety - Adult  Goal: Free from fall injury  Outcome: Progressing     Problem: Discharge Planning  Goal: Discharge to home or other facility with appropriate resources  Outcome: Progressing     Problem: Chronic Conditions and Co-morbidities  Goal: Patient's chronic conditions and co-morbidity symptoms are monitored and maintained or improved  Outcome: Progressing     Problem: Pain  Goal: Takes deep breaths with improved pain control throughout the shift  Outcome: Progressing  Goal: Turns in bed with improved pain control throughout the shift  Outcome: Progressing  Goal: Walks with improved pain control throughout the shift  Outcome: Progressing  Goal: Performs ADL's with improved pain control throughout shift  Outcome: Progressing  Goal: Participates in PT with improved pain control throughout the shift  Outcome: Progressing  Goal: Free from opioid side effects throughout the shift  Outcome:  Progressing  Goal: Free from acute confusion related to pain meds throughout the shift  Outcome: Progressing   The patient's goals for the shift include      The clinical goals for the shift include pain management    Over the shift, the patient did not make progress toward the following goals. Barriers to progression include . Recommendations to address these barriers include .

## 2024-04-25 NOTE — DISCHARGE INSTRUCTIONS
Instructions After Surgery    Wound Care:   -Ice packs to wounds every hour the first day  -Ok to shower in 24 hours  -no baths or swimming for 2 weeks  -keep wound clean and dry  -do not apply topical creams or ointments  -call if you notice redness around wound, foul-smelling drainage, or increasing pain     Diet:   -Avoid greasy, fatty foods first few weeks   -Germfask, soft meals first day or two after surgery    Activity   -Take it easy for the first 48 hours   -stairs and walks are fine   -resume activities gradually over the first week   -ask Dr Salinas before resuming strenuous physical exertions   -No driving while on pain medication    Medications   -Pain medicine prescription attached for severe pain   -You can also take Motrin/Ibuprofen 400mg every 6 hours for mild pain   -Resume your home medications unless otherwise directed   -To avoid constipation please take Colace 100mg twice a day (over the counter)    Other Instructions   -Call to make appointment within 1-2 days:  [phone]   -Call the doctor for the following:  severe unrelieved pain  fevers > 101F  Nausea/vomiting  wound issues  insurance/return to work forms  shortness of breath  chest pains   -Feedback on your surgical experience is appreciated!      Wound Care:   Abdominal surgical incision: full thickness  Every other day: Cleanse with Vashe wash (allow to sit on wound bed for 3 minutes) and pat dry.  Apply No-sting barrier film to skin surrounding wound (lollipop).  Hydrafera Blue Ready to the wound base (cut to wound shape, writing faces up)  Cover with Mepilex foam border dressing.  You can do it!   **Return to Ostomy Clinic at [facility] on Friday, May 10th at 10am (arrive 15 mintues early for check-in). [facility] [address]

## 2024-04-25 NOTE — PROGRESS NOTES
04/25/24 7253   Discharge Planning   Patient expects to be discharged to: Home, waiting for PT/OT eval         Patient had surgery yesterday:  Exploratory laparotomy, drainage of abdominal wall abscess, partial colon resection with diverting end colostomy. He is on PCA pump, pain controlled. Awaiting for PT/OT to eval patient and rec for home going needs. Will follow for discharge needs.

## 2024-04-25 NOTE — POST-PROCEDURE NOTE
Patient is POD 0 Exploratory laparotomy, drainage of abdominal wall abscess, partial colon resection with diverting end colostomy Findings: This appeared to be a colon cancer that had eroded into the abdominal wall and was densely adherent to the bladder.  Damage control operation was performed with washout placement of drains and diverting end colostomy With Dr. Salinas.  Reports some mild soreness after surgery. Denies nausea/vomiting.    PE:  Constitutional: A&Ox3, calm and cooperative  Head/Neck: Neck supple, no JVD  Cardiovascular: RRR  Respiratory/Thorax: Non labored breathing on RA  Gastrointestinal: Abdomen nondistended, soft, nontender. Lap sites C/D/I, edges well approximated, covered in Dermabond Stoma is beefy red, moist, producing no stool, and gas, well pouched. No oozing or erythema noted around the DUSTY drain site.   Genitourinary: Batista in place  Musculoskeletal: ROM intact, no joint swelling, normal strength  Extremities: MORENO, No peripheral edema  Neurological: No focal deficits   Psychological: Appropriate mood and behavior  Skin: Warm and dry.       Radiology and labs reviewed. VSS, afebrile.    Plan:   - Diet: NPO  - Continue current pain regimen   - PRN antiemetic   - DVT Proph: SCDs/ ambulate/ Heparin  - Bowel regimen: Miralax   - Monitor VS every 4 hours   - Labs ordered for AM   - IS every hour while awake   - Encourage ambulation / OOB as tolerated   - Instructed on post operative care, s/s of infection  - Monitor lap sites for drainage, erythema, excessive bruising   - Continue supportive care    Dispo: Doing well postoperatively. ARBF. Will continue to monitor    Total time spent 25 minutes, and greater than 50% of time was spent in counseling/coordination of care

## 2024-04-25 NOTE — PROGRESS NOTES
"James Ramirez is a 44 y.o. male on day 1 of admission presenting with Bowel perforation (Multi).    Subjective   Pain under good control with PCA  Feels better compared to pre op       Objective     Physical Exam  Comfortable  Stoma edema, no stool in bag  VAC in place  Incisional tenderness   Last Recorded Vitals  Blood pressure 124/80, pulse 94, temperature 36.4 °C (97.6 °F), temperature source Temporal, resp. rate 18, height 1.83 m (6' 0.05\"), weight 74.8 kg (165 lb), SpO2 98%.  Intake/Output last 3 Shifts:  I/O last 3 completed shifts:  In: 1284.6 (17.2 mL/kg) [P.O.:30; I.V.:1204.6 (16.1 mL/kg); IV Piggyback:50]  Out: 1000 (13.4 mL/kg) [Urine:725 (0.3 mL/kg/hr); Drains:225; Blood:50]  Weight: 74.8 kg     Relevant Results  Lab Results   Component Value Date    WBC 16.5 (H) 04/25/2024    HGB 9.6 (L) 04/25/2024    HCT 30.3 (L) 04/25/2024    MCV 79 (L) 04/25/2024     (H) 04/25/2024     Lab Results   Component Value Date    GLUCOSE 235 (H) 04/25/2024     (L) 04/25/2024    K 4.2 04/25/2024    CL 98 04/25/2024    CO2 25 04/25/2024    ANIONGAP 13 04/25/2024    BUN 9 04/25/2024    CREATININE 0.62 04/25/2024    EGFR >90 04/25/2024    CALCIUM 8.6 04/25/2024    ALBUMIN 2.9 (L) 04/25/2024    ALKPHOS 63 04/25/2024    PROT 6.2 (L) 04/25/2024    AST 14 04/25/2024    BILITOT 0.7 04/25/2024    ALT 5 (L) 04/25/2024       Assessment/Plan   Principal Problem:    Bowel perforation (Multi)  Active Problems:    Intra-abdominal abscess (Multi)    Diverticulitis    POD #1 Exploratory laparotomy, drainage of abdominal wall abscess, partial colon resection with diverting end colostomy        Discontinue jordan  Keep on IV abx  Encourage incentive spirometry    DVT prophylaxis    Keep on PCA 1 more day    Up out of bed to chair    Await return GI function     Ayo Barrios MD      "

## 2024-04-25 NOTE — H&P
History Of Present Illness  James Ramirez is a 44 y.o. male presenting with lower abdominal pain going on for past 1 week and progressively getting worse.  Last few days his appetite is very poor.  His last bowel movement was this morning.  Presented to the emergency room and found to have intra-abdominal abscess.  Patient has history of diverticular abscess with hepatic abscess s/p drainage in 2022     Past Medical History  History reviewed. No pertinent past medical history.  Diverticular disease, left inguinal hernia repair with mesh in 2009  Surgical History  Past Surgical History:   Procedure Laterality Date    HERNIA REPAIR      OTHER SURGICAL HISTORY          Social History  He reports that he has never smoked. He has never been exposed to tobacco smoke. He has never used smokeless tobacco. He reports current drug use. Drug: Marijuana. No history on file for alcohol use.    Family History  No family history on file.  Denies family history of diverticular disease  Allergies  Patient has no known allergies.    Review of Systems   Constitutional:  Negative for activity change, appetite change, chills, diaphoresis, fatigue, fever and unexpected weight change.   HENT:  Negative for congestion, dental problem, drooling, ear discharge, ear pain, facial swelling, hearing loss, mouth sores, nosebleeds, postnasal drip, rhinorrhea, sinus pressure, sinus pain, sneezing, sore throat, tinnitus, trouble swallowing and voice change.    Eyes:  Negative for photophobia, pain, discharge, redness, itching and visual disturbance.   Respiratory:  Negative for apnea, cough, choking, chest tightness, shortness of breath, wheezing and stridor.    Cardiovascular:  Negative for chest pain, palpitations and leg swelling.   Gastrointestinal:  Positive for abdominal pain. Negative for anal bleeding, blood in stool, constipation, diarrhea, nausea, rectal pain and vomiting.   Endocrine: Negative for cold intolerance, heat intolerance,  polydipsia, polyphagia and polyuria.   Genitourinary:  Negative for decreased urine volume, difficulty urinating, dysuria, enuresis, flank pain, frequency, genital sores, hematuria and urgency.   Musculoskeletal:  Negative for arthralgias, back pain, gait problem, joint swelling, myalgias, neck pain and neck stiffness.   Skin:  Negative for color change, pallor, rash and wound.   Allergic/Immunologic: Negative for environmental allergies, food allergies and immunocompromised state.   Neurological:  Negative for dizziness, tremors, seizures, syncope, facial asymmetry, speech difficulty, weakness, light-headedness, numbness and headaches.   Hematological:  Negative for adenopathy. Does not bruise/bleed easily.   Psychiatric/Behavioral:  Negative for agitation, behavioral problems, confusion, decreased concentration, dysphoric mood, hallucinations, self-injury, sleep disturbance and suicidal ideas. The patient is not nervous/anxious and is not hyperactive.         Physical Exam  Vitals reviewed.   Constitutional:       Appearance: Normal appearance.   HENT:      Head: Normocephalic and atraumatic.      Right Ear: Tympanic membrane, ear canal and external ear normal.      Left Ear: Tympanic membrane, ear canal and external ear normal.      Nose: Nose normal.      Mouth/Throat:      Pharynx: Oropharynx is clear.   Eyes:      Extraocular Movements: Extraocular movements intact.      Conjunctiva/sclera: Conjunctivae normal.      Pupils: Pupils are equal, round, and reactive to light.   Cardiovascular:      Rate and Rhythm: Normal rate and regular rhythm.      Pulses: Normal pulses.      Heart sounds: Normal heart sounds.   Pulmonary:      Effort: Pulmonary effort is normal.      Breath sounds: Normal breath sounds.   Abdominal:      General: Abdomen is flat. Bowel sounds are normal.      Palpations: Abdomen is soft.      Tenderness: There is abdominal tenderness. There is guarding.   Musculoskeletal:      Cervical back:  "Normal range of motion and neck supple.   Skin:     General: Skin is warm and dry.   Neurological:      General: No focal deficit present.      Mental Status: He is alert and oriented to person, place, and time.   Psychiatric:         Mood and Affect: Mood normal.          Last Recorded Vitals  Blood pressure 109/78, pulse 103, temperature 36.1 °C (97 °F), temperature source Temporal, resp. rate 20, height 1.83 m (6' 0.05\"), weight 74.8 kg (165 lb), SpO2 96%.    Relevant Results        Scheduled medications  acetaminophen, 1,000 mg, intravenous, Once  docusate sodium, 100 mg, oral, BID  heparin (porcine), 5,000 Units, subcutaneous, q8h LILY  [START ON 4/25/2024] pantoprazole, 40 mg, intravenous, Daily before breakfast  piperacillin-tazobactam, 3.375 g, intravenous, q6h  [START ON 4/25/2024] polyethylene glycol, 17 g, oral, Daily  [START ON 4/25/2024] vancomycin, 1,000 mg, intravenous, q12h      Continuous medications  potassium sujwwpd-K4-0.45%NaCl, 100 mL/hr  HYDROmorphone,       PRN medications  PRN medications: acetaminophen **OR** acetaminophen **OR** acetaminophen, morphine, naloxone, ondansetron **OR** ondansetron, vancomycin  Results for orders placed or performed during the hospital encounter of 04/24/24 (from the past 24 hour(s))   Lactate   Result Value Ref Range    Lactate 2.2 (H) 0.4 - 2.0 mmol/L   Lipase   Result Value Ref Range    Lipase <3 (L) 9 - 82 U/L   Comprehensive Metabolic Panel   Result Value Ref Range    Glucose 112 (H) 74 - 99 mg/dL    Sodium 132 (L) 136 - 145 mmol/L    Potassium 3.6 3.5 - 5.3 mmol/L    Chloride 93 (L) 98 - 107 mmol/L    Bicarbonate 24 21 - 32 mmol/L    Anion Gap 19 10 - 20 mmol/L    Urea Nitrogen 11 6 - 23 mg/dL    Creatinine 0.78 0.50 - 1.30 mg/dL    eGFR >90 >60 mL/min/1.73m*2    Calcium 9.2 8.6 - 10.3 mg/dL    Albumin 3.6 3.4 - 5.0 g/dL    Alkaline Phosphatase 73 33 - 120 U/L    Total Protein 8.4 (H) 6.4 - 8.2 g/dL    AST 7 (L) 9 - 39 U/L    Bilirubin, Total 0.9 0.0 - 1.2 " mg/dL    ALT 7 (L) 10 - 52 U/L   CBC and Auto Differential   Result Value Ref Range    WBC 11.7 (H) 4.4 - 11.3 x10*3/uL    nRBC 0.0 0.0 - 0.0 /100 WBCs    RBC 4.61 4.50 - 5.90 x10*6/uL    Hemoglobin 11.7 (L) 13.5 - 17.5 g/dL    Hematocrit 36.0 (L) 41.0 - 52.0 %    MCV 78 (L) 80 - 100 fL    MCH 25.4 (L) 26.0 - 34.0 pg    MCHC 32.5 32.0 - 36.0 g/dL    RDW 13.0 11.5 - 14.5 %    Platelets 683 (H) 150 - 450 x10*3/uL    Neutrophils % 69.8 40.0 - 80.0 %    Immature Granulocytes %, Automated 0.5 0.0 - 0.9 %    Lymphocytes % 13.9 13.0 - 44.0 %    Monocytes % 15.3 2.0 - 10.0 %    Eosinophils % 0.2 0.0 - 6.0 %    Basophils % 0.3 0.0 - 2.0 %    Neutrophils Absolute 8.18 (H) 1.20 - 7.70 x10*3/uL    Immature Granulocytes Absolute, Automated 0.06 0.00 - 0.70 x10*3/uL    Lymphocytes Absolute 1.63 1.20 - 4.80 x10*3/uL    Monocytes Absolute 1.79 (H) 0.10 - 1.00 x10*3/uL    Eosinophils Absolute 0.02 0.00 - 0.70 x10*3/uL    Basophils Absolute 0.03 0.00 - 0.10 x10*3/uL   Blood Culture    Specimen: Peripheral Venipuncture; Blood culture   Result Value Ref Range    Blood Culture Loaded on Instrument - Culture in progress    Blood Culture    Specimen: Peripheral Venipuncture; Blood culture   Result Value Ref Range    Blood Culture Loaded on Instrument - Culture in progress    ECG 12 Lead   Result Value Ref Range    Ventricular Rate 116 BPM    Atrial Rate 116 BPM    HI Interval 130 ms    QRS Duration 80 ms    QT Interval 300 ms    QTC Calculation(Bazett) 417 ms    P Axis 67 degrees    R Axis 61 degrees    T Axis 39 degrees    QRS Count 19 beats    Q Onset 222 ms    P Onset 157 ms    P Offset 209 ms    T Offset 372 ms    QTC Fredericia 374 ms   Urinalysis with Reflex Culture and Microscopic   Result Value Ref Range    Color, Urine Yellow Light-Yellow, Yellow, Dark-Yellow    Appearance, Urine Clear Clear    Specific Gravity, Urine 1.047 (N) 1.005 - 1.035    pH, Urine 5.5 5.0, 5.5, 6.0, 6.5, 7.0, 7.5, 8.0    Protein, Urine 30 (1+) (A)  NEGATIVE, 10 (TRACE), 20 (TRACE) mg/dL    Glucose, Urine Normal Normal mg/dL    Blood, Urine NEGATIVE NEGATIVE    Ketones, Urine 150 (4+) (A) NEGATIVE mg/dL    Bilirubin, Urine 0.5 (1+) (A) NEGATIVE    Urobilinogen, Urine 4 (2+) (A) Normal mg/dL    Nitrite, Urine NEGATIVE NEGATIVE    Leukocyte Esterase, Urine NEGATIVE NEGATIVE   Extra Urine Gray Tube   Result Value Ref Range    Extra Tube Hold for add-ons.    Urinalysis Microscopic   Result Value Ref Range    WBC, Urine 1-5 1-5, NONE /HPF    RBC, Urine 1-2 NONE, 1-2, 3-5 /HPF    Mucus, Urine 3+ Reference range not established. /LPF   Lactate   Result Value Ref Range    Lactate 2.3 (H) 0.4 - 2.0 mmol/L   Type And Screen   Result Value Ref Range    ABO TYPE O     Rh TYPE POS     ANTIBODY SCREEN NEG    Protime-INR   Result Value Ref Range    Protime 18.9 (H) 9.8 - 12.8 seconds    INR 1.7 (H) 0.9 - 1.1   APTT   Result Value Ref Range    aPTT 30 27 - 38 seconds     ECG 12 Lead    Result Date: 4/24/2024  Sinus tachycardia Minimal voltage criteria for LVH, may be normal variant ( Sokolow-Busch ) Borderline ECG No previous ECGs available See ED provider note for full interpretation and clinical correlation Confirmed by Jyoti Melendrez ([id number]) on 4/24/2024 4:32:39 PM    XR chest 1 view    Result Date: 4/24/2024  Interpreted By:  Joleen Pascual, STUDY: Chest, single AP view.   INDICATION: Signs/Symptoms:Pre-op.   COMPARISON: CT of the abdomen and pelvis study dated 04/24/2024.   ACCESSION NUMBER(S): [id number]   ORDERING CLINICIAN: MEHDI BROWN   FINDINGS: The cardiac silhouette size is within normal limits. There is no focal consolidation, edema or pneumothorax. No sizeable pleural effusion. Calcified granuloma noted in the right mid lung. No acute osseous abnormality.       1. No acute cardiopulmonary process.   MACRO: None.   Signed by: Joleen Pascual 4/24/2024 11:59 AM Dictation workstation:   [id number]    CT abdomen pelvis w IV contrast    Result Date:  4/24/2024  Interpreted By:  Julianne Meneses, STUDY: CT ABDOMEN PELVIS W IV CONTRAST;  4/24/2024 11:00 am   INDICATION: Signs/Symptoms:lower abdominal pain and swelling eval for possible incarcerated hernia.   COMPARISON: None.   ACCESSION NUMBER(S): [id number]   ORDERING CLINICIAN: AILYN PANG   TECHNIQUE: CT of the abdomen and pelvis was performed.  85 mL Omnipaque 350   FINDINGS: LOWER CHEST: Images of the lung bases show no infiltrate or pleural fluid.   ABDOMEN:   LIVER: There is no hepatic mass.   BILE DUCTS: There is no intrahepatic, common hepatic or common bile ductal dilatation.   GALLBLADDER: The gallbladder is unremarkable.   PANCREAS: The pancreas is unremarkable.   SPLEEN: The spleen is unremarkable. There is no splenic mass or splenomegaly.   ADRENAL GLANDS: The adrenal glands are unremarkable.   KIDNEYS AND URETERS: The kidneys function symmetrically. The kidneys demonstrate no mass. There is no intrarenal calculus or hydronephrosis.     VESSELS: The abdominal and pelvic vessels are unremarkable.   PERITONEUM/RETROPERITONEUM/LYMPH NODES: There is no retroperitoneal or pelvic adenopathy.  There is no ascites.   ABDOMINAL WALL/BOWEL: There is thickening of the right and left rectus abdominis muscles. There is an abscess containing fluid and air in the anterior abdominal wall the in the midline between the rectus abdominis muscles. This measures 9.0 x 2.7 x 3.6 cm in longitudinal, transverse and AP dimensions respectively. There are multiple smaller dots of air in the subcutaneous fat. There is an abscess with an air-fluid level centrally in the pelvis measuring 9.1 x 7.9 x 3.8 cm in longitudinal, transverse and AP dimensions respectively. There are multiple dots of air in the peritoneal cavity around this larger abscess. Findings are consistent with a viscus perforation, likely extending into the anterior abdominal wall. There is a 2.0 x 0.8 cm abscess in the left side of the pelvis with smaller  dots of air. There is a thickened loop of sigmoid colon and this is likely perforated sigmoid diverticulitis.   BONE AND SOFT TISSUE: There is no acute osseous finding.   There is no soft tissue abnormality.       1. Central intraabdominal abscess measuring 9.1 x 7.9 x 3.8 cm.. There are multiple dots of air around this larger abscess consistent with localized perforation and viscus perforation. This is likely perforated sigmoid diverticulitis. There are smaller abscesses in the abdomen and pelvis.   2. This abscess extends into the anterior abdominal wall between the rectus abdominis muscles.   MACRO: Julianne Meneses discussed the significance and urgency of this critical finding by secure chat with Dionisio Salinas and AILYN PANG on 4/24/2024 at 11:40 am.  (**-RCF-**) Findings:  See findings.   Signed by: Julianne Meneses 4/24/2024 11:41 AM Dictation workstation:   [id number]        Assessment/Plan   Principal Problem:    Bowel perforation (Multi)  Active Problems:    Intra-abdominal abscess (Multi)    Diverticulitis      N.p.o.  Vanco mycin and Zosyn for infection  IV fluids  IV pain medication  Surgery consult for abscess drainage       I spent  minutes in the professional and overall care of this patient.      Zahira Miller MD

## 2024-04-26 ENCOUNTER — APPOINTMENT (OUTPATIENT)
Dept: RADIOLOGY | Facility: HOSPITAL | Age: 44
DRG: 329 | End: 2024-04-26
Payer: COMMERCIAL

## 2024-04-26 LAB
ALBUMIN SERPL BCP-MCNC: 2.6 G/DL (ref 3.4–5)
ALP SERPL-CCNC: 76 U/L (ref 33–120)
ALT SERPL W P-5'-P-CCNC: 7 U/L (ref 10–52)
ANION GAP SERPL CALC-SCNC: 13 MMOL/L (ref 10–20)
AST SERPL W P-5'-P-CCNC: 9 U/L (ref 9–39)
BILIRUB SERPL-MCNC: 0.4 MG/DL (ref 0–1.2)
BUN SERPL-MCNC: 7 MG/DL (ref 6–23)
CALCIUM SERPL-MCNC: 7.5 MG/DL (ref 8.6–10.3)
CHLORIDE SERPL-SCNC: 96 MMOL/L (ref 98–107)
CO2 SERPL-SCNC: 26 MMOL/L (ref 21–32)
CREAT SERPL-MCNC: 0.61 MG/DL (ref 0.5–1.3)
EGFRCR SERPLBLD CKD-EPI 2021: >90 ML/MIN/1.73M*2
ERYTHROCYTE [DISTWIDTH] IN BLOOD BY AUTOMATED COUNT: 13.2 % (ref 11.5–14.5)
GLUCOSE SERPL-MCNC: 171 MG/DL (ref 74–99)
HCT VFR BLD AUTO: 28.1 % (ref 41–52)
HGB BLD-MCNC: 8.8 G/DL (ref 13.5–17.5)
HOLD SPECIMEN: NORMAL
MCH RBC QN AUTO: 25.3 PG (ref 26–34)
MCHC RBC AUTO-ENTMCNC: 31.3 G/DL (ref 32–36)
MCV RBC AUTO: 81 FL (ref 80–100)
NRBC BLD-RTO: 0 /100 WBCS (ref 0–0)
PLATELET # BLD AUTO: 560 X10*3/UL (ref 150–450)
POTASSIUM SERPL-SCNC: 4 MMOL/L (ref 3.5–5.3)
PROT SERPL-MCNC: 5.8 G/DL (ref 6.4–8.2)
RBC # BLD AUTO: 3.48 X10*6/UL (ref 4.5–5.9)
SODIUM SERPL-SCNC: 131 MMOL/L (ref 136–145)
VANCOMYCIN SERPL-MCNC: 11 UG/ML (ref 5–20)
WBC # BLD AUTO: 21.1 X10*3/UL (ref 4.4–11.3)

## 2024-04-26 PROCEDURE — 2500000004 HC RX 250 GENERAL PHARMACY W/ HCPCS (ALT 636 FOR OP/ED): Performed by: SURGERY

## 2024-04-26 PROCEDURE — 2500000004 HC RX 250 GENERAL PHARMACY W/ HCPCS (ALT 636 FOR OP/ED): Performed by: PHARMACIST

## 2024-04-26 PROCEDURE — 2500000001 HC RX 250 WO HCPCS SELF ADMINISTERED DRUGS (ALT 637 FOR MEDICARE OP): Performed by: INTERNAL MEDICINE

## 2024-04-26 PROCEDURE — 80053 COMPREHEN METABOLIC PANEL: CPT

## 2024-04-26 PROCEDURE — 36415 COLL VENOUS BLD VENIPUNCTURE: CPT

## 2024-04-26 PROCEDURE — 2500000004 HC RX 250 GENERAL PHARMACY W/ HCPCS (ALT 636 FOR OP/ED): Performed by: INTERNAL MEDICINE

## 2024-04-26 PROCEDURE — C9113 INJ PANTOPRAZOLE SODIUM, VIA: HCPCS | Performed by: INTERNAL MEDICINE

## 2024-04-26 PROCEDURE — 36415 COLL VENOUS BLD VENIPUNCTURE: CPT | Performed by: PHARMACIST

## 2024-04-26 PROCEDURE — 1200000002 HC GENERAL ROOM WITH TELEMETRY DAILY

## 2024-04-26 PROCEDURE — 2500000001 HC RX 250 WO HCPCS SELF ADMINISTERED DRUGS (ALT 637 FOR MEDICARE OP)

## 2024-04-26 PROCEDURE — 71045 X-RAY EXAM CHEST 1 VIEW: CPT | Performed by: RADIOLOGY

## 2024-04-26 PROCEDURE — 9420000001 HC RT PATIENT EDUCATION 5 MIN

## 2024-04-26 PROCEDURE — 85027 COMPLETE CBC AUTOMATED: CPT

## 2024-04-26 PROCEDURE — 80202 ASSAY OF VANCOMYCIN: CPT | Performed by: PHARMACIST

## 2024-04-26 PROCEDURE — 71045 X-RAY EXAM CHEST 1 VIEW: CPT

## 2024-04-26 PROCEDURE — 99231 SBSQ HOSP IP/OBS SF/LOW 25: CPT | Performed by: INTERNAL MEDICINE

## 2024-04-26 PROCEDURE — 2500000005 HC RX 250 GENERAL PHARMACY W/O HCPCS: Performed by: INTERNAL MEDICINE

## 2024-04-26 RX ORDER — ACETAMINOPHEN 650 MG/1
650 SUPPOSITORY RECTAL EVERY 6 HOURS
Status: DISCONTINUED | OUTPATIENT
Start: 2024-04-26 | End: 2024-05-09 | Stop reason: HOSPADM

## 2024-04-26 RX ORDER — ONDANSETRON HYDROCHLORIDE 2 MG/ML
4 INJECTION, SOLUTION INTRAVENOUS EVERY 6 HOURS PRN
Status: DISCONTINUED | OUTPATIENT
Start: 2024-04-26 | End: 2024-05-09 | Stop reason: HOSPADM

## 2024-04-26 RX ORDER — ACETAMINOPHEN 325 MG/1
650 TABLET ORAL EVERY 6 HOURS
Status: DISCONTINUED | OUTPATIENT
Start: 2024-04-26 | End: 2024-05-09 | Stop reason: HOSPADM

## 2024-04-26 RX ORDER — ACETAMINOPHEN 160 MG/5ML
650 SOLUTION ORAL EVERY 6 HOURS
Status: DISCONTINUED | OUTPATIENT
Start: 2024-04-26 | End: 2024-05-09 | Stop reason: HOSPADM

## 2024-04-26 RX ORDER — ONDANSETRON 4 MG/1
4 TABLET, FILM COATED ORAL EVERY 6 HOURS PRN
Status: DISCONTINUED | OUTPATIENT
Start: 2024-04-26 | End: 2024-05-09 | Stop reason: HOSPADM

## 2024-04-26 RX ADMIN — ACETAMINOPHEN 650 MG: 650 SOLUTION ORAL at 17:24

## 2024-04-26 RX ADMIN — PIPERACILLIN SODIUM AND TAZOBACTAM SODIUM 3.38 G: 3; .375 INJECTION, SOLUTION INTRAVENOUS at 15:17

## 2024-04-26 RX ADMIN — VANCOMYCIN HYDROCHLORIDE 1500 MG: 1.5 INJECTION, POWDER, LYOPHILIZED, FOR SOLUTION INTRAVENOUS at 17:24

## 2024-04-26 RX ADMIN — PIPERACILLIN SODIUM AND TAZOBACTAM SODIUM 3.38 G: 3; .375 INJECTION, SOLUTION INTRAVENOUS at 08:58

## 2024-04-26 RX ADMIN — VANCOMYCIN HYDROCHLORIDE 1000 MG: 1 INJECTION, SOLUTION INTRAVENOUS at 03:56

## 2024-04-26 RX ADMIN — ONDANSETRON HYDROCHLORIDE 4 MG: 4 TABLET, FILM COATED ORAL at 10:33

## 2024-04-26 RX ADMIN — PIPERACILLIN SODIUM AND TAZOBACTAM SODIUM 3.38 G: 3; .375 INJECTION, SOLUTION INTRAVENOUS at 03:02

## 2024-04-26 RX ADMIN — HEPARIN SODIUM 5000 UNITS: 5000 INJECTION INTRAVENOUS; SUBCUTANEOUS at 06:26

## 2024-04-26 RX ADMIN — ACETAMINOPHEN 650 MG: 650 SOLUTION ORAL at 22:22

## 2024-04-26 RX ADMIN — PANTOPRAZOLE SODIUM 40 MG: 40 INJECTION, POWDER, FOR SOLUTION INTRAVENOUS at 06:26

## 2024-04-26 RX ADMIN — DOCUSATE SODIUM 100 MG: 100 CAPSULE, LIQUID FILLED ORAL at 08:58

## 2024-04-26 RX ADMIN — HEPARIN SODIUM 5000 UNITS: 5000 INJECTION INTRAVENOUS; SUBCUTANEOUS at 22:22

## 2024-04-26 RX ADMIN — HEPARIN SODIUM 5000 UNITS: 5000 INJECTION INTRAVENOUS; SUBCUTANEOUS at 15:17

## 2024-04-26 RX ADMIN — PIPERACILLIN SODIUM AND TAZOBACTAM SODIUM 3.38 G: 3; .375 INJECTION, SOLUTION INTRAVENOUS at 22:22

## 2024-04-26 ASSESSMENT — PAIN SCALES - GENERAL
PAINLEVEL_OUTOF10: 5 - MODERATE PAIN

## 2024-04-26 ASSESSMENT — COGNITIVE AND FUNCTIONAL STATUS - GENERAL
MOBILITY SCORE: 19
STANDING UP FROM CHAIR USING ARMS: A LITTLE
TURNING FROM BACK TO SIDE WHILE IN FLAT BAD: A LITTLE
CLIMB 3 TO 5 STEPS WITH RAILING: A LITTLE
DRESSING REGULAR LOWER BODY CLOTHING: A LITTLE
MOVING TO AND FROM BED TO CHAIR: A LITTLE
WALKING IN HOSPITAL ROOM: A LITTLE
HELP NEEDED FOR BATHING: A LITTLE
DAILY ACTIVITIY SCORE: 22

## 2024-04-26 ASSESSMENT — PAIN - FUNCTIONAL ASSESSMENT
PAIN_FUNCTIONAL_ASSESSMENT: 0-10

## 2024-04-26 NOTE — PROGRESS NOTES
"James Ramirez is a 44 y.o. male on day 1 of admission presenting with Bowel perforation (Multi).    Subjective   Still in a lot of pain       Objective     Physical Exam  Vitals reviewed.   Constitutional:       Appearance: Normal appearance.   HENT:      Head: Normocephalic and atraumatic.      Right Ear: Tympanic membrane, ear canal and external ear normal.      Left Ear: Tympanic membrane, ear canal and external ear normal.      Nose: Nose normal.      Mouth/Throat:      Pharynx: Oropharynx is clear.   Eyes:      Extraocular Movements: Extraocular movements intact.      Conjunctiva/sclera: Conjunctivae normal.      Pupils: Pupils are equal, round, and reactive to light.   Cardiovascular:      Rate and Rhythm: Normal rate and regular rhythm.      Pulses: Normal pulses.      Heart sounds: Normal heart sounds.   Pulmonary:      Effort: Pulmonary effort is normal.      Breath sounds: Normal breath sounds.   Abdominal:      General: Abdomen is flat. Bowel sounds are normal.      Palpations: Abdomen is soft.      Tenderness: There is abdominal tenderness.   Musculoskeletal:      Cervical back: Normal range of motion and neck supple.   Skin:     General: Skin is warm and dry.   Neurological:      General: No focal deficit present.      Mental Status: He is alert and oriented to person, place, and time.   Psychiatric:         Mood and Affect: Mood normal.         Last Recorded Vitals  Blood pressure 130/85, pulse 108, temperature 37.3 °C (99.1 °F), temperature source Temporal, resp. rate 20, height 1.83 m (6' 0.05\"), weight 74.8 kg (165 lb), SpO2 96%.  Intake/Output last 3 Shifts:  I/O last 3 completed shifts:  In: 2294.6 (30.7 mL/kg) [P.O.:590; I.V.:1204.6 (16.1 mL/kg); IV Piggyback:500]  Out: 1574 (21 mL/kg) [Urine:1225 (0.5 mL/kg/hr); Drains:299; Blood:50]  Weight: 74.8 kg     Relevant Results              Scheduled medications  acetaminophen, 1,000 mg, intravenous, Once  docusate sodium, 100 mg, oral, BID  heparin " (porcine), 5,000 Units, subcutaneous, q8h ILLY  pantoprazole, 40 mg, intravenous, Daily before breakfast  piperacillin-tazobactam, 3.375 g, intravenous, q6h  polyethylene glycol, 17 g, oral, Daily  vancomycin, 1,000 mg, intravenous, q12h      Continuous medications  potassium kvadmoa-Y0-5.45%NaCl, 100 mL/hr, Last Rate: 100 mL/hr (04/25/24 1452)  HYDROmorphone,       PRN medications  PRN medications: acetaminophen **OR** acetaminophen **OR** acetaminophen, morphine, naloxone, ondansetron **OR** ondansetron, vancomycin  Results for orders placed or performed during the hospital encounter of 04/24/24 (from the past 24 hour(s))   Comprehensive metabolic panel   Result Value Ref Range    Glucose 235 (H) 74 - 99 mg/dL    Sodium 132 (L) 136 - 145 mmol/L    Potassium 4.2 3.5 - 5.3 mmol/L    Chloride 98 98 - 107 mmol/L    Bicarbonate 25 21 - 32 mmol/L    Anion Gap 13 10 - 20 mmol/L    Urea Nitrogen 9 6 - 23 mg/dL    Creatinine 0.62 0.50 - 1.30 mg/dL    eGFR >90 >60 mL/min/1.73m*2    Calcium 8.6 8.6 - 10.3 mg/dL    Albumin 2.9 (L) 3.4 - 5.0 g/dL    Alkaline Phosphatase 63 33 - 120 U/L    Total Protein 6.2 (L) 6.4 - 8.2 g/dL    AST 14 9 - 39 U/L    Bilirubin, Total 0.7 0.0 - 1.2 mg/dL    ALT 5 (L) 10 - 52 U/L   CBC   Result Value Ref Range    WBC 16.5 (H) 4.4 - 11.3 x10*3/uL    nRBC 0.0 0.0 - 0.0 /100 WBCs    RBC 3.82 (L) 4.50 - 5.90 x10*6/uL    Hemoglobin 9.6 (L) 13.5 - 17.5 g/dL    Hematocrit 30.3 (L) 41.0 - 52.0 %    MCV 79 (L) 80 - 100 fL    MCH 25.1 (L) 26.0 - 34.0 pg    MCHC 31.7 (L) 32.0 - 36.0 g/dL    RDW 13.2 11.5 - 14.5 %    Platelets 600 (H) 150 - 450 x10*3/uL     ECG 12 Lead    Result Date: 4/24/2024  Sinus tachycardia Minimal voltage criteria for LVH, may be normal variant ( Sokolow-Busch ) Borderline ECG No previous ECGs available See ED provider note for full interpretation and clinical correlation Confirmed by Jyoti Melendrez ([id number]) on 4/24/2024 4:32:39 PM    XR chest 1 view    Result Date:  4/24/2024  Interpreted By:  Joleen Pascual, STUDY: Chest, single AP view.   INDICATION: Signs/Symptoms:Pre-op.   COMPARISON: CT of the abdomen and pelvis study dated 04/24/2024.   ACCESSION NUMBER(S): [id number]   ORDERING CLINICIAN: MEHDI BROWN   FINDINGS: The cardiac silhouette size is within normal limits. There is no focal consolidation, edema or pneumothorax. No sizeable pleural effusion. Calcified granuloma noted in the right mid lung. No acute osseous abnormality.       1. No acute cardiopulmonary process.   MACRO: None.   Signed by: Joleen Pascual 4/24/2024 11:59 AM Dictation workstation:   [id number]    CT abdomen pelvis w IV contrast    Result Date: 4/24/2024  Interpreted By:  Julianne Meneses, STUDY: CT ABDOMEN PELVIS W IV CONTRAST;  4/24/2024 11:00 am   INDICATION: Signs/Symptoms:lower abdominal pain and swelling eval for possible incarcerated hernia.   COMPARISON: None.   ACCESSION NUMBER(S): [id number]   ORDERING CLINICIAN: AILYN PANG   TECHNIQUE: CT of the abdomen and pelvis was performed.  85 mL Omnipaque 350   FINDINGS: LOWER CHEST: Images of the lung bases show no infiltrate or pleural fluid.   ABDOMEN:   LIVER: There is no hepatic mass.   BILE DUCTS: There is no intrahepatic, common hepatic or common bile ductal dilatation.   GALLBLADDER: The gallbladder is unremarkable.   PANCREAS: The pancreas is unremarkable.   SPLEEN: The spleen is unremarkable. There is no splenic mass or splenomegaly.   ADRENAL GLANDS: The adrenal glands are unremarkable.   KIDNEYS AND URETERS: The kidneys function symmetrically. The kidneys demonstrate no mass. There is no intrarenal calculus or hydronephrosis.     VESSELS: The abdominal and pelvic vessels are unremarkable.   PERITONEUM/RETROPERITONEUM/LYMPH NODES: There is no retroperitoneal or pelvic adenopathy.  There is no ascites.   ABDOMINAL WALL/BOWEL: There is thickening of the right and left rectus abdominis muscles. There is an abscess containing  fluid and air in the anterior abdominal wall the in the midline between the rectus abdominis muscles. This measures 9.0 x 2.7 x 3.6 cm in longitudinal, transverse and AP dimensions respectively. There are multiple smaller dots of air in the subcutaneous fat. There is an abscess with an air-fluid level centrally in the pelvis measuring 9.1 x 7.9 x 3.8 cm in longitudinal, transverse and AP dimensions respectively. There are multiple dots of air in the peritoneal cavity around this larger abscess. Findings are consistent with a viscus perforation, likely extending into the anterior abdominal wall. There is a 2.0 x 0.8 cm abscess in the left side of the pelvis with smaller dots of air. There is a thickened loop of sigmoid colon and this is likely perforated sigmoid diverticulitis.   BONE AND SOFT TISSUE: There is no acute osseous finding.   There is no soft tissue abnormality.       1. Central intraabdominal abscess measuring 9.1 x 7.9 x 3.8 cm.. There are multiple dots of air around this larger abscess consistent with localized perforation and viscus perforation. This is likely perforated sigmoid diverticulitis. There are smaller abscesses in the abdomen and pelvis.   2. This abscess extends into the anterior abdominal wall between the rectus abdominis muscles.   MACRO: Julianne Meneses discussed the significance and urgency of this critical finding by secure chat with Dionisio Salinas and AILYN PANG on 4/24/2024 at 11:40 am.  (**-RCF-**) Findings:  See findings.   Signed by: Julianne Meneses 4/24/2024 11:41 AM Dictation workstation:   [id number]                  Assessment/Plan   Principal Problem:    Bowel perforation (Multi)  Active Problems:    Intra-abdominal abscess (Multi)    Diverticulitis    S/p exploratory lap with drainage of abdominal wall abscess, partial colon resection with diverting end colostomy  Continue antibiotics  Pain medication  PT OT       I spent  minutes in the professional and overall care of  this patient.      Zahira Miller MD

## 2024-04-26 NOTE — CONSULTS
Wound Care Consult     Visit Date: 4/26/2024      Patient Name: James Ramirez         MRN: [id number]           YOB: 1980     Reason for Consult: Midline NPWT dressing change. Pouch check            Pertinent Labs:   Albumin   Date Value Ref Range Status   04/26/2024 2.6 (L) 3.4 - 5.0 g/dL Final       Wound Assessment:  Wound 04/24/24 Incision Abdomen Lower;Medial (Active)   Wound Image   04/26/24 1040   Site Assessment Pink;Red 04/26/24 1040   [place]a-Wound Assessment Intact 04/26/24 1040   Shape Linear 04/26/24 1040   Wound Length (cm) 7.5 cm 04/26/24 1040   Wound Width (cm) 3.5 cm 04/26/24 1040   Wound Surface Area (cm^2) 26.25 cm^2 04/26/24 1040   Wound Depth (cm) 2.4 cm 04/26/24 1040   Wound Volume (cm^3) 63 cm^3 04/26/24 1040   Sutures/Staple Line Approximated 04/24/24 2119   Dressing Transparent film 04/24/24 2200   Dressing Status Clean;Dry 04/26/24 0900       Wound Team Summary Assessment: Wound Vac applied to Midline  (1)  Xeroform   (1)  black foam   (medium )Granufoam       Pressure; 75 mmHg    Plan:  Change wound Vac 2 times a week and as needed     Consider medicating the patient 30 - 60 minutes prior to dressing change. If there is susanna blood in the tubing (active bleeding), stop the suction and call physician. If the suction is off for greater than 2 hours, remove foam dressing and replace with moist saline dressing. Change canister every week or when full. Remove foam dressing and replace with saline dressing for transfer or discharge.    If patient is discharged prior to next dressing change, please disconnect VAC machine, remove foam dressing apply moist saline dressing. Then place machine in the front soiled utility room on the unit.       Wound Team Plan: WOC will follow while inpatient     While in bed patient should only be on one fitted sheet, and one chux. Please, do not use brief while patient is resting in bed. Elevate heels off the bed surface at all times. Turn and reposition  at least every 2 hours.     Thank you for this consultations, while inpatient please contact with any questions or changes in condition.        Wendi Matos RN BSN,[facility],CWOCN  [phone]   4/26/2024  3:25 PM

## 2024-04-26 NOTE — PROGRESS NOTES
"James Ramirez is a 44 y.o. male on day 2 of admission presenting with Bowel perforation (Multi).    Assessment/Plan   status post laparoscopic colon resection with end colostomy.  We suspect underlying malignancy but await final pathology report.  Stoma nurse to see.  Wound care team to do wound VAC change today.  Needs to be more active.  Continue antibiotics.    Subjective   Overall feeling better.       Objective     Physical Exam  NAD  A&Ox3  Non icteric  CTA  RR  Abdomen soft min tender. Wounds clean, intact.  Wound VAC functioning.  Serosanguineous output in drain.  He has been urinating okay.  Lots of gas in stoma bag but no stool   Extremities warm, well perfused     Last Recorded Vitals  Blood pressure 127/83, pulse 106, temperature 36.7 °C (98 °F), temperature source Temporal, resp. rate 20, height 1.83 m (6' 0.05\"), weight 74.8 kg (165 lb), SpO2 98%.  Intake/Output last 3 Shifts:  I/O last 3 completed shifts:  In: 2244.6 (30 mL/kg) [P.O.:590; I.V.:204.6 (2.7 mL/kg); IV Piggyback:1450]  Out: 2129 (28.4 mL/kg) [Urine:1600 (0.6 mL/kg/hr); Drains:529]  Weight: 74.8 kg     Relevant Results    Scheduled medications  acetaminophen, 1,000 mg, intravenous, Once  docusate sodium, 100 mg, oral, BID  heparin (porcine), 5,000 Units, subcutaneous, q8h LILY  pantoprazole, 40 mg, intravenous, Daily before breakfast  piperacillin-tazobactam, 3.375 g, intravenous, q6h  polyethylene glycol, 17 g, oral, Daily  vancomycin, 1,000 mg, intravenous, q12h      Continuous medications  potassium izrjsxb-W2-9.45%NaCl, 100 mL/hr, Last Rate: 100 mL/hr (04/26/24 0333)  HYDROmorphone,       PRN medications  PRN medications: acetaminophen **OR** acetaminophen **OR** acetaminophen, morphine, naloxone, ondansetron **OR** ondansetron, vancomycin    Results for orders placed or performed during the hospital encounter of 04/24/24 (from the past 24 hour(s))   CBC   Result Value Ref Range    WBC 21.1 (H) 4.4 - 11.3 x10*3/uL    nRBC 0.0 0.0 - 0.0 " /100 WBCs    RBC 3.48 (L) 4.50 - 5.90 x10*6/uL    Hemoglobin 8.8 (L) 13.5 - 17.5 g/dL    Hematocrit 28.1 (L) 41.0 - 52.0 %    MCV 81 80 - 100 fL    MCH 25.3 (L) 26.0 - 34.0 pg    MCHC 31.3 (L) 32.0 - 36.0 g/dL    RDW 13.2 11.5 - 14.5 %    Platelets 560 (H) 150 - 450 x10*3/uL   Comprehensive metabolic panel   Result Value Ref Range    Glucose 171 (H) 74 - 99 mg/dL    Sodium 131 (L) 136 - 145 mmol/L    Potassium 4.0 3.5 - 5.3 mmol/L    Chloride 96 (L) 98 - 107 mmol/L    Bicarbonate 26 21 - 32 mmol/L    Anion Gap 13 10 - 20 mmol/L    Urea Nitrogen 7 6 - 23 mg/dL    Creatinine 0.61 0.50 - 1.30 mg/dL    eGFR >90 >60 mL/min/1.73m*2    Calcium 7.5 (L) 8.6 - 10.3 mg/dL    Albumin 2.6 (L) 3.4 - 5.0 g/dL    Alkaline Phosphatase 76 33 - 120 U/L    Total Protein 5.8 (L) 6.4 - 8.2 g/dL    AST 9 9 - 39 U/L    Bilirubin, Total 0.4 0.0 - 1.2 mg/dL    ALT 7 (L) 10 - 52 U/L           I spent 25 minutes in the professional and overall care of this patient.      Dionisio Salinas MD

## 2024-04-26 NOTE — PROGRESS NOTES
"Vancomycin Dosing by Pharmacy- FOLLOW UP    James Ramirez is a 44 y.o. year old male who Pharmacy has been consulted for vancomycin dosing for other intra-abdominal infection . Based on the patient's indication and renal status this patient is being dosed based on a goal AUC of 400-600.     Renal function is currently stable. Scr: 0.61, eCrCl: 125    Current vancomycin dose: 1000 mg given every 12 hours    Estimated vancomycin AUC on current dose: 327 mg/L.hr     Visit Vitals  /83 (BP Location: Right arm, Patient Position: Lying)   Pulse 106   Temp 36.7 °C (98 °F) (Temporal)   Resp 20        Lab Results   Component Value Date    CREATININE 0.61 04/26/2024    CREATININE 0.62 04/25/2024    CREATININE 0.78 04/24/2024        Patient weight is No results found for: \"PTWEIGHT\"    No results found for: \"CULTURE\"     I/O last 3 completed shifts:  In: 2244.6 (30 mL/kg) [P.O.:590; I.V.:204.6 (2.7 mL/kg); IV Piggyback:1450]  Out: 2129 (28.4 mL/kg) [Urine:1600 (0.6 mL/kg/hr); Drains:529]  Weight: 74.8 kg   [unfilled]    No results found for: \"PATIENTTEMP\"     Assessment/Plan    Below goal AUC. Orders placed for new vancomcyin regimen of 1500 every 12 hours to begin at 1600.     This dosing regimen is predicted by [employer] to result in the following pharmacokinetic parameters:  Loading dose: N/A  Regimen: 1000 mg IV every 12 hours.  Start time: 15:56 on 04/26/2024  Exposure target: AUC24 (range)400-600 mg/L.hr   AUC24,ss: 327 mg/L.hr  Probability of AUC24 > 400: 9 %  Ctrough,ss: 8.5 mg/L  Probability of Ctrough,ss > 20: 0 %  Probability of nephrotoxicity (Lodise MCKAY 2009): 5 %    The next level will be obtained on 4/28 at 1000. May be obtained sooner if clinically indicated.   Will continue to monitor renal function daily while on vancomycin and order serum creatinine at least every 48 hours if not already ordered.  Follow for continued vancomycin needs, clinical response, and signs/symptoms of toxicity.       Alejandrina LIPSCOMB" Noreen, PharmD

## 2024-04-26 NOTE — PROGRESS NOTES
Occupational Therapy                 Therapy Communication Note    Patient Name: James Ramirez  MRN: [id number]  Today's Date: 4/26/2024     Discipline: Occupational Therapy    Missed Visit Reason:  (Referral received, chart reviewed and eval attempted, however patient refused. Patient refused to sit on EOB for eval x 3 times, unable to complete eval.)    Missed Time: Attempt    Comment:

## 2024-04-26 NOTE — PROGRESS NOTES
04/26/24 0755   Discharge Planning   Patient expects to be discharged to: home. HHC is rec; will ask patient. Has new ostomy, so hhc would be advisable     James Ramirez is a 44 y.o. male on day 2 of admission presenting with Bowel perforation (Multi).    Kami Zheng RN   Met with patietn. He is quite overwhelmed  Wants to know when we will remove the ostomy. Provided education on this  Notified ostomy nurse, she will see today  Rec is hhc, will choice when adod is sooner

## 2024-04-26 NOTE — PROGRESS NOTES
Physical Therapy                 Therapy Communication Note    Patient Name: James Ramirez  MRN: [id number]  Today's Date: 4/26/2024     Discipline: Physical Therapy    Missed Visit Reason: Missed Visit Reason: Patient refused (Pt stating he had just had an NG Tube placed and does not want to participate in therapy.  Much encouragement given,  and pt educated on the benefits of therapy to aid in his recovery.)    Missed Time: Attempt

## 2024-04-27 PROBLEM — N17.9 ACUTE KIDNEY INJURY (CMS-HCC): Status: ACTIVE | Noted: 2024-04-27

## 2024-04-27 LAB
ALBUMIN SERPL BCP-MCNC: 2.3 G/DL (ref 3.4–5)
ALP SERPL-CCNC: 53 U/L (ref 33–120)
ALT SERPL W P-5'-P-CCNC: 6 U/L (ref 10–52)
ANION GAP SERPL CALC-SCNC: 12 MMOL/L (ref 10–20)
AST SERPL W P-5'-P-CCNC: 14 U/L (ref 9–39)
B-LACTAMASE ORGANISM ISLT: POSITIVE
BACTERIA SPEC CULT: ABNORMAL
BACTERIA SPEC CULT: ABNORMAL
BILIRUB SERPL-MCNC: 0.5 MG/DL (ref 0–1.2)
BUN SERPL-MCNC: 12 MG/DL (ref 6–23)
CALCIUM SERPL-MCNC: 7.7 MG/DL (ref 8.6–10.3)
CHLORIDE SERPL-SCNC: 96 MMOL/L (ref 98–107)
CO2 SERPL-SCNC: 26 MMOL/L (ref 21–32)
CREAT SERPL-MCNC: 1.79 MG/DL (ref 0.5–1.3)
EGFRCR SERPLBLD CKD-EPI 2021: 48 ML/MIN/1.73M*2
ERYTHROCYTE [DISTWIDTH] IN BLOOD BY AUTOMATED COUNT: 13.2 % (ref 11.5–14.5)
GLUCOSE SERPL-MCNC: 134 MG/DL (ref 74–99)
GRAM STN SPEC: ABNORMAL
GRAM STN SPEC: ABNORMAL
HCT VFR BLD AUTO: 25.6 % (ref 41–52)
HGB BLD-MCNC: 7.9 G/DL (ref 13.5–17.5)
MCH RBC QN AUTO: 24.7 PG (ref 26–34)
MCHC RBC AUTO-ENTMCNC: 30.9 G/DL (ref 32–36)
MCV RBC AUTO: 80 FL (ref 80–100)
NRBC BLD-RTO: 0 /100 WBCS (ref 0–0)
PLATELET # BLD AUTO: 497 X10*3/UL (ref 150–450)
POTASSIUM SERPL-SCNC: 4.1 MMOL/L (ref 3.5–5.3)
PROT SERPL-MCNC: 5 G/DL (ref 6.4–8.2)
RBC # BLD AUTO: 3.2 X10*6/UL (ref 4.5–5.9)
SODIUM SERPL-SCNC: 130 MMOL/L (ref 136–145)
VANCOMYCIN SERPL-MCNC: 43.7 UG/ML (ref 5–20)
WBC # BLD AUTO: 19.3 X10*3/UL (ref 4.4–11.3)

## 2024-04-27 PROCEDURE — 1200000002 HC GENERAL ROOM WITH TELEMETRY DAILY

## 2024-04-27 PROCEDURE — 99233 SBSQ HOSP IP/OBS HIGH 50: CPT | Performed by: NURSE PRACTITIONER

## 2024-04-27 PROCEDURE — 84075 ASSAY ALKALINE PHOSPHATASE: CPT

## 2024-04-27 PROCEDURE — 9420000001 HC RT PATIENT EDUCATION 5 MIN

## 2024-04-27 PROCEDURE — 2500000004 HC RX 250 GENERAL PHARMACY W/ HCPCS (ALT 636 FOR OP/ED): Performed by: NURSE PRACTITIONER

## 2024-04-27 PROCEDURE — C9113 INJ PANTOPRAZOLE SODIUM, VIA: HCPCS | Performed by: INTERNAL MEDICINE

## 2024-04-27 PROCEDURE — 2500000001 HC RX 250 WO HCPCS SELF ADMINISTERED DRUGS (ALT 637 FOR MEDICARE OP): Performed by: INTERNAL MEDICINE

## 2024-04-27 PROCEDURE — 36415 COLL VENOUS BLD VENIPUNCTURE: CPT | Performed by: INTERNAL MEDICINE

## 2024-04-27 PROCEDURE — 99232 SBSQ HOSP IP/OBS MODERATE 35: CPT | Performed by: INTERNAL MEDICINE

## 2024-04-27 PROCEDURE — 2500000004 HC RX 250 GENERAL PHARMACY W/ HCPCS (ALT 636 FOR OP/ED): Performed by: SURGERY

## 2024-04-27 PROCEDURE — 2500000001 HC RX 250 WO HCPCS SELF ADMINISTERED DRUGS (ALT 637 FOR MEDICARE OP)

## 2024-04-27 PROCEDURE — 80202 ASSAY OF VANCOMYCIN: CPT | Performed by: INTERNAL MEDICINE

## 2024-04-27 PROCEDURE — 2500000004 HC RX 250 GENERAL PHARMACY W/ HCPCS (ALT 636 FOR OP/ED): Performed by: INTERNAL MEDICINE

## 2024-04-27 PROCEDURE — 86160 COMPLEMENT ANTIGEN: CPT | Mod: AHULAB | Performed by: INTERNAL MEDICINE

## 2024-04-27 PROCEDURE — 2500000004 HC RX 250 GENERAL PHARMACY W/ HCPCS (ALT 636 FOR OP/ED): Performed by: PHARMACIST

## 2024-04-27 PROCEDURE — 2500000004 HC RX 250 GENERAL PHARMACY W/ HCPCS (ALT 636 FOR OP/ED)

## 2024-04-27 PROCEDURE — 36415 COLL VENOUS BLD VENIPUNCTURE: CPT

## 2024-04-27 PROCEDURE — 85027 COMPLETE CBC AUTOMATED: CPT

## 2024-04-27 RX ADMIN — BENZOCAINE AND MENTHOL 1 LOZENGE: 15; 3.6 LOZENGE ORAL at 21:51

## 2024-04-27 RX ADMIN — POTASSIUM CHLORIDE, DEXTROSE MONOHYDRATE AND SODIUM CHLORIDE 100 ML/HR: 150; 5; 450 INJECTION, SOLUTION INTRAVENOUS at 02:34

## 2024-04-27 RX ADMIN — POLYETHYLENE GLYCOL 3350 17 G: 17 POWDER, FOR SOLUTION ORAL at 08:44

## 2024-04-27 RX ADMIN — ONDANSETRON 4 MG: 2 INJECTION INTRAMUSCULAR; INTRAVENOUS at 21:05

## 2024-04-27 RX ADMIN — VANCOMYCIN HYDROCHLORIDE 1500 MG: 1.5 INJECTION, POWDER, LYOPHILIZED, FOR SOLUTION INTRAVENOUS at 04:24

## 2024-04-27 RX ADMIN — POTASSIUM CHLORIDE, DEXTROSE MONOHYDRATE AND SODIUM CHLORIDE 100 ML/HR: 150; 5; 450 INJECTION, SOLUTION INTRAVENOUS at 17:18

## 2024-04-27 RX ADMIN — ACETAMINOPHEN 650 MG: 650 SOLUTION ORAL at 11:02

## 2024-04-27 RX ADMIN — PIPERACILLIN SODIUM AND TAZOBACTAM SODIUM 3.38 G: 3; .375 INJECTION, SOLUTION INTRAVENOUS at 08:44

## 2024-04-27 RX ADMIN — PIPERACILLIN SODIUM AND TAZOBACTAM SODIUM 3.38 G: 3; .375 INJECTION, SOLUTION INTRAVENOUS at 21:05

## 2024-04-27 RX ADMIN — SODIUM CHLORIDE, POTASSIUM CHLORIDE, SODIUM LACTATE AND CALCIUM CHLORIDE 1000 ML: 600; 310; 30; 20 INJECTION, SOLUTION INTRAVENOUS at 10:11

## 2024-04-27 RX ADMIN — HEPARIN SODIUM 5000 UNITS: 5000 INJECTION INTRAVENOUS; SUBCUTANEOUS at 21:51

## 2024-04-27 RX ADMIN — PIPERACILLIN SODIUM AND TAZOBACTAM SODIUM 3.38 G: 3; .375 INJECTION, SOLUTION INTRAVENOUS at 14:30

## 2024-04-27 RX ADMIN — Medication: at 02:53

## 2024-04-27 RX ADMIN — HEPARIN SODIUM 5000 UNITS: 5000 INJECTION INTRAVENOUS; SUBCUTANEOUS at 14:30

## 2024-04-27 RX ADMIN — ACETAMINOPHEN 650 MG: 650 SOLUTION ORAL at 21:51

## 2024-04-27 RX ADMIN — ACETAMINOPHEN 650 MG: 650 SOLUTION ORAL at 04:24

## 2024-04-27 RX ADMIN — PIPERACILLIN SODIUM AND TAZOBACTAM SODIUM 3.38 G: 3; .375 INJECTION, SOLUTION INTRAVENOUS at 02:59

## 2024-04-27 RX ADMIN — HEPARIN SODIUM 5000 UNITS: 5000 INJECTION INTRAVENOUS; SUBCUTANEOUS at 06:43

## 2024-04-27 RX ADMIN — PANTOPRAZOLE SODIUM 40 MG: 40 INJECTION, POWDER, FOR SOLUTION INTRAVENOUS at 06:44

## 2024-04-27 ASSESSMENT — PAIN SCALES - GENERAL
PAINLEVEL_OUTOF10: 6
PAINLEVEL_OUTOF10: 6
PAINLEVEL_OUTOF10: 5 - MODERATE PAIN

## 2024-04-27 ASSESSMENT — COGNITIVE AND FUNCTIONAL STATUS - GENERAL
MOVING TO AND FROM BED TO CHAIR: A LITTLE
CLIMB 3 TO 5 STEPS WITH RAILING: A LITTLE
HELP NEEDED FOR BATHING: A LITTLE
MOBILITY SCORE: 22
WALKING IN HOSPITAL ROOM: A LITTLE
DAILY ACTIVITIY SCORE: 22
HELP NEEDED FOR BATHING: A LITTLE
DRESSING REGULAR LOWER BODY CLOTHING: A LITTLE
STANDING UP FROM CHAIR USING ARMS: A LITTLE
MOBILITY SCORE: 19
CLIMB 3 TO 5 STEPS WITH RAILING: A LITTLE
DAILY ACTIVITIY SCORE: 22
WALKING IN HOSPITAL ROOM: A LITTLE
DRESSING REGULAR LOWER BODY CLOTHING: A LITTLE
TURNING FROM BACK TO SIDE WHILE IN FLAT BAD: A LITTLE

## 2024-04-27 ASSESSMENT — PAIN DESCRIPTION - DESCRIPTORS
DESCRIPTORS: ACHING
DESCRIPTORS: ACHING

## 2024-04-27 ASSESSMENT — PAIN - FUNCTIONAL ASSESSMENT
PAIN_FUNCTIONAL_ASSESSMENT: 0-10

## 2024-04-27 NOTE — PROGRESS NOTES
04/27/24 1142   Discharge Planning   Patient expects to be discharged to: Home with HHC     Per notes patient not med ready for discharge, on IV vanc and IV zosyn for Bowl Perf, had a colon resection with end colostomy, wound nurse following patient will need HHC on discharge , per notes patient to choice HHC once ADOD is closer.

## 2024-04-27 NOTE — PROGRESS NOTES
"Jamse Ramirez is a 44 y.o. male on day 3 of admission presenting with Bowel perforation (Multi).    Subjective   Patient still having a lot of pain.  He had a low-grade fever this morning  Very upset with his condition because he cannot work and worried about his job       Objective     Physical Exam  Vitals reviewed.   Constitutional:       Appearance: Normal appearance.   HENT:      Head: Normocephalic and atraumatic.      Right Ear: Tympanic membrane, ear canal and external ear normal.      Left Ear: Tympanic membrane, ear canal and external ear normal.      Nose: Nose normal.      Mouth/Throat:      Pharynx: Oropharynx is clear.   Eyes:      Extraocular Movements: Extraocular movements intact.      Conjunctiva/sclera: Conjunctivae normal.      Pupils: Pupils are equal, round, and reactive to light.   Cardiovascular:      Rate and Rhythm: Normal rate and regular rhythm.      Pulses: Normal pulses.      Heart sounds: Normal heart sounds.   Pulmonary:      Effort: Pulmonary effort is normal.      Breath sounds: Normal breath sounds.   Abdominal:      General: Abdomen is flat. Bowel sounds are normal.      Palpations: Abdomen is soft.      Tenderness: There is abdominal tenderness.   Musculoskeletal:      Cervical back: Normal range of motion and neck supple.   Skin:     General: Skin is warm and dry.   Neurological:      General: No focal deficit present.      Mental Status: He is alert and oriented to person, place, and time.   Psychiatric:         Mood and Affect: Mood normal.         Last Recorded Vitals  Blood pressure 126/86, pulse 93, temperature 37.2 °C (99 °F), temperature source Temporal, resp. rate 19, height 1.83 m (6' 0.05\"), weight 74.8 kg (165 lb), SpO2 99%.  Intake/Output last 3 Shifts:  I/O last 3 completed shifts:  In: - (0 mL/kg)   Out: 1205 (16.1 mL/kg) [Emesis/NG output:1000; Drains:205]  Weight: 74.8 kg     Relevant Results              Scheduled medications  acetaminophen, 650 mg, nasogastric " tube, q6h   Or  acetaminophen, 650 mg, nasogastric tube, q6h   Or  acetaminophen, 650 mg, rectal, q6h  docusate sodium, 100 mg, oral, BID  heparin (porcine), 5,000 Units, subcutaneous, q8h LILY  pantoprazole, 40 mg, intravenous, Daily before breakfast  phenylephrine, 1 spray, Each Nostril, Once  piperacillin-tazobactam, 3.375 g, intravenous, q6h  polyethylene glycol, 17 g, oral, Daily      Continuous medications  potassium iqcodhg-G2-5.45%NaCl, 100 mL/hr, Last Rate: 100 mL/hr (04/27/24 6178)  HYDROmorphone,       PRN medications  PRN medications: [Held by provider] morphine, naloxone, ondansetron **OR** ondansetron, vancomycin  Results for orders placed or performed during the hospital encounter of 04/24/24 (from the past 24 hour(s))   CBC   Result Value Ref Range    WBC 19.3 (H) 4.4 - 11.3 x10*3/uL    nRBC 0.0 0.0 - 0.0 /100 WBCs    RBC 3.20 (L) 4.50 - 5.90 x10*6/uL    Hemoglobin 7.9 (L) 13.5 - 17.5 g/dL    Hematocrit 25.6 (L) 41.0 - 52.0 %    MCV 80 80 - 100 fL    MCH 24.7 (L) 26.0 - 34.0 pg    MCHC 30.9 (L) 32.0 - 36.0 g/dL    RDW 13.2 11.5 - 14.5 %    Platelets 497 (H) 150 - 450 x10*3/uL   Comprehensive metabolic panel   Result Value Ref Range    Glucose 134 (H) 74 - 99 mg/dL    Sodium 130 (L) 136 - 145 mmol/L    Potassium 4.1 3.5 - 5.3 mmol/L    Chloride 96 (L) 98 - 107 mmol/L    Bicarbonate 26 21 - 32 mmol/L    Anion Gap 12 10 - 20 mmol/L    Urea Nitrogen 12 6 - 23 mg/dL    Creatinine 1.79 (H) 0.50 - 1.30 mg/dL    eGFR 48 (L) >60 mL/min/1.73m*2    Calcium 7.7 (L) 8.6 - 10.3 mg/dL    Albumin 2.3 (L) 3.4 - 5.0 g/dL    Alkaline Phosphatase 53 33 - 120 U/L    Total Protein 5.0 (L) 6.4 - 8.2 g/dL    AST 14 9 - 39 U/L    Bilirubin, Total 0.5 0.0 - 1.2 mg/dL    ALT 6 (L) 10 - 52 U/L   Vancomycin   Result Value Ref Range    Vancomycin 43.7 (H) 5.0 - 20.0 ug/mL     XR chest 1 view    Result Date: 4/26/2024  Interpreted By:  Reji Senior, STUDY: XR CHEST 1 VIEW;  4/26/2024 1:32 pm   INDICATION: Signs/Symptoms:NG  placement confirmation.   COMPARISON: CT scan abdomen and pelvis from 04/24/2024. Chest x-ray from 04/24/2024.   ACCESSION NUMBER(S): [id number]   ORDERING CLINICIAN: SUMA CANAS   TECHNIQUE: Single AP portable view of the chest was obtained.   FINDINGS: MEDIASTINUM/ LUNGS/ HAILEY:   Suboptimal level of inspiration. Mild cardiomegaly. No gross vascular congestion or pleural effusion. No mass or pneumonia in either lung. No pneumothorax. No tracheal deviation. No abnormal hilar fullness or gross mass on either side.   BONES: No lytic or blastic destructive bone lesion.   UPPER ABDOMEN: Distal tip of a newly placed NG tube is in the lateral proximal stomach. There is mild-to-moderate colonic stool and gas across the transverse colon.       Hypoventilatory exam.   Mild cardiomegaly.   NG tube in place as described.   MACRO: None   Signed by: Reji Senior 4/26/2024 1:45 PM Dictation workstation:   [id number]                  Assessment/Plan   Principal Problem:    Bowel perforation (Multi)  Active Problems:    Intra-abdominal abscess (Multi)    Diverticulitis    Acute kidney injury (CMS-HCC)    Kidney function has gone up  Probably from dehydration and sepsis  Consult ID  Continue IV fluids  Continue IV pain medication       I spent  minutes in the professional and overall care of this patient.      Zahira Miller MD

## 2024-04-27 NOTE — PROGRESS NOTES
"James Ramirez is a 44 y.o. male on day 2 of admission presenting with Bowel perforation (Multi).    Subjective   Patient pain is slightly better today       Objective     Physical Exam  Vitals reviewed.   Constitutional:       Appearance: Normal appearance.   HENT:      Head: Normocephalic and atraumatic.      Right Ear: Tympanic membrane, ear canal and external ear normal.      Left Ear: Tympanic membrane, ear canal and external ear normal.      Nose: Nose normal.      Mouth/Throat:      Pharynx: Oropharynx is clear.   Eyes:      Extraocular Movements: Extraocular movements intact.      Conjunctiva/sclera: Conjunctivae normal.      Pupils: Pupils are equal, round, and reactive to light.   Cardiovascular:      Rate and Rhythm: Normal rate and regular rhythm.      Pulses: Normal pulses.      Heart sounds: Normal heart sounds.   Pulmonary:      Effort: Pulmonary effort is normal.      Breath sounds: Normal breath sounds.   Abdominal:      General: Abdomen is flat. Bowel sounds are normal.      Palpations: Abdomen is soft.      Tenderness: There is abdominal tenderness.   Musculoskeletal:      Cervical back: Normal range of motion and neck supple.   Skin:     General: Skin is warm and dry.   Neurological:      General: No focal deficit present.      Mental Status: He is alert and oriented to person, place, and time.   Psychiatric:         Mood and Affect: Mood normal.         Last Recorded Vitals  Blood pressure 132/86, pulse 90, temperature 37.5 °C (99.5 °F), temperature source Temporal, resp. rate 20, height 1.83 m (6' 0.05\"), weight 74.8 kg (165 lb), SpO2 97%.  Intake/Output last 3 Shifts:  I/O last 3 completed shifts:  In: 2010 (26.9 mL/kg) [P.O.:560; IV Piggyback:1450]  Out: 1459 (19.5 mL/kg) [Urine:925 (0.3 mL/kg/hr); Emesis/NG output:150; Drains:384]  Weight: 74.8 kg     Relevant Results              Scheduled medications  acetaminophen, 650 mg, nasogastric tube, q6h   Or  acetaminophen, 650 mg, nasogastric tube, " q6h   Or  acetaminophen, 650 mg, rectal, q6h  docusate sodium, 100 mg, oral, BID  heparin (porcine), 5,000 Units, subcutaneous, q8h LILY  pantoprazole, 40 mg, intravenous, Daily before breakfast  phenylephrine, 1 spray, Each Nostril, Once  piperacillin-tazobactam, 3.375 g, intravenous, q6h  polyethylene glycol, 17 g, oral, Daily  vancomycin, 1,500 mg, intravenous, q12h      Continuous medications  potassium lvyqxhd-S8-4.45%NaCl, 100 mL/hr, Last Rate: 100 mL/hr (04/26/24 0333)  HYDROmorphone,       PRN medications  PRN medications: [Held by provider] morphine, naloxone, ondansetron **OR** ondansetron, vancomycin  Results for orders placed or performed during the hospital encounter of 04/24/24 (from the past 24 hour(s))   CBC   Result Value Ref Range    WBC 21.1 (H) 4.4 - 11.3 x10*3/uL    nRBC 0.0 0.0 - 0.0 /100 WBCs    RBC 3.48 (L) 4.50 - 5.90 x10*6/uL    Hemoglobin 8.8 (L) 13.5 - 17.5 g/dL    Hematocrit 28.1 (L) 41.0 - 52.0 %    MCV 81 80 - 100 fL    MCH 25.3 (L) 26.0 - 34.0 pg    MCHC 31.3 (L) 32.0 - 36.0 g/dL    RDW 13.2 11.5 - 14.5 %    Platelets 560 (H) 150 - 450 x10*3/uL   Comprehensive metabolic panel   Result Value Ref Range    Glucose 171 (H) 74 - 99 mg/dL    Sodium 131 (L) 136 - 145 mmol/L    Potassium 4.0 3.5 - 5.3 mmol/L    Chloride 96 (L) 98 - 107 mmol/L    Bicarbonate 26 21 - 32 mmol/L    Anion Gap 13 10 - 20 mmol/L    Urea Nitrogen 7 6 - 23 mg/dL    Creatinine 0.61 0.50 - 1.30 mg/dL    eGFR >90 >60 mL/min/1.73m*2    Calcium 7.5 (L) 8.6 - 10.3 mg/dL    Albumin 2.6 (L) 3.4 - 5.0 g/dL    Alkaline Phosphatase 76 33 - 120 U/L    Total Protein 5.8 (L) 6.4 - 8.2 g/dL    AST 9 9 - 39 U/L    Bilirubin, Total 0.4 0.0 - 1.2 mg/dL    ALT 7 (L) 10 - 52 U/L   Vancomycin   Result Value Ref Range    Vancomycin 11.0 5.0 - 20.0 ug/mL   Lavender Top   Result Value Ref Range    Extra Tube Hold for add-ons.      XR chest 1 view    Result Date: 4/26/2024  Interpreted By:  Reji Senior, STUDY: XR CHEST 1 VIEW;  4/26/2024  1:32 pm   INDICATION: Signs/Symptoms:NG placement confirmation.   COMPARISON: CT scan abdomen and pelvis from 04/24/2024. Chest x-ray from 04/24/2024.   ACCESSION NUMBER(S): [id number]   ORDERING CLINICIAN: SUMA CANAS   TECHNIQUE: Single AP portable view of the chest was obtained.   FINDINGS: MEDIASTINUM/ LUNGS/ HAILEY:   Suboptimal level of inspiration. Mild cardiomegaly. No gross vascular congestion or pleural effusion. No mass or pneumonia in either lung. No pneumothorax. No tracheal deviation. No abnormal hilar fullness or gross mass on either side.   BONES: No lytic or blastic destructive bone lesion.   UPPER ABDOMEN: Distal tip of a newly placed NG tube is in the lateral proximal stomach. There is mild-to-moderate colonic stool and gas across the transverse colon.       Hypoventilatory exam.   Mild cardiomegaly.   NG tube in place as described.   MACRO: None   Signed by: Reji Senior 4/26/2024 1:45 PM Dictation workstation:   [id number]                  Assessment/Plan   Principal Problem:    Bowel perforation (Multi)  Active Problems:    Intra-abdominal abscess (Multi)    Diverticulitis    S/p laparoscopic colon resection with colostomy  Continue antibiotics  IV fluids  Pain medication  PT OT         I spent  minutes in the professional and overall care of this patient.      Zahira Miller MD

## 2024-04-27 NOTE — PROGRESS NOTES
"Vancomycin Dosing by Pharmacy- FOLLOW UP    James Ramirez is a 44 y.o. year old male who Pharmacy has been consulted for vancomycin dosing for other intra-abdominal infection . Based on the patient's indication and renal status this patient is being dosed based on a goal trough/random level of 10-15.     Renal function is currently declining. No baseline SrCr found, with SrCr more than doubling from the last 3 labs (0.78 -> 0.62 -> 0.61), will dose by level.    Last Vancomycin dose: 1500 mg Q12H with last dose 4/27 @0424    Most recent random level: 43.7 mcg/mL @1448    Visit Vitals  /86 (BP Location: Left arm, Patient Position: Lying)   Pulse 93   Temp 37.2 °C (99 °F) (Temporal)   Resp 19        Lab Results   Component Value Date    CREATININE 1.79 (H) 04/27/2024    CREATININE 0.61 04/26/2024    CREATININE 0.62 04/25/2024    CREATININE 0.78 04/24/2024        Patient weight is No results found for: \"PTWEIGHT\"    No results found for: \"CULTURE\"     I/O last 3 completed shifts:  In: 1000 (13.4 mL/kg) [IV Piggyback:1000]  Out: 1560 (20.8 mL/kg) [Urine:425 (0.2 mL/kg/hr); Emesis/NG output:800; Drains:335]  Weight: 74.8 kg   [unfilled]    No results found for: \"PATIENTTEMP\"     Assessment/Plan    Above goal random/trough level. Will hold dose for today and continue to monitor levels for re-dosing.  The next level will be obtained on 4/28 at 0500. May be obtained sooner if clinically indicated.   Will continue to monitor renal function daily while on vancomycin and order serum creatinine at least every 48 hours if not already ordered.  Follow for continued vancomycin needs, clinical response, and signs/symptoms of toxicity.       Walter Howard, PharmD           "

## 2024-04-27 NOTE — PROGRESS NOTES
"James Ramirez is a 44 y.o. male on day 3 of admission presenting with Bowel perforation (Multi).    Subjective   Having some intermittent nausea but is feeling much better after NGT placed last night. Stoma without output. Did have temp of 38.2 last night. VSS       Objective     Physical Exam  Constitutional:       Appearance: He is ill-appearing and diaphoretic.   Eyes:      Conjunctiva/sclera: Conjunctivae normal.   Cardiovascular:      Rate and Rhythm: Normal rate.      Pulses: Normal pulses.   Pulmonary:      Effort: Pulmonary effort is normal.   Abdominal:      Comments: Moderately distended, soft, appropriately tender, wound vac with SSD in canister, DUSTY drains SS, colostomy Beefy red, moist, producing no stool, + gas, well pouched    Genitourinary:     Comments: Voiding without difficulty   Musculoskeletal:         General: Normal range of motion.   Skin:     General: Skin is warm.   Neurological:      Mental Status: He is oriented to person, place, and time.   Psychiatric:         Mood and Affect: Mood normal.         Behavior: Behavior normal.         Last Recorded Vitals  Blood pressure 132/84, pulse 95, temperature 37.3 °C (99.1 °F), temperature source Temporal, resp. rate 20, height 1.83 m (6' 0.05\"), weight 74.8 kg (165 lb), SpO2 99%.  Intake/Output last 3 Shifts:  I/O last 3 completed shifts:  In: 1000 (13.4 mL/kg) [IV Piggyback:1000]  Out: 1560 (20.8 mL/kg) [Urine:425 (0.2 mL/kg/hr); Emesis/NG output:800; Drains:335]  Weight: 74.8 kg     Medications:   Scheduled medications  acetaminophen, 650 mg, nasogastric tube, q6h   Or  acetaminophen, 650 mg, nasogastric tube, q6h   Or  acetaminophen, 650 mg, rectal, q6h  docusate sodium, 100 mg, oral, BID  heparin (porcine), 5,000 Units, subcutaneous, q8h LILY  pantoprazole, 40 mg, intravenous, Daily before breakfast  phenylephrine, 1 spray, Each Nostril, Once  piperacillin-tazobactam, 3.375 g, intravenous, q6h  polyethylene glycol, 17 g, oral, Daily  vancomycin, " 1,500 mg, intravenous, q12h      Continuous medications  potassium pejkqwb-F7-9.45%NaCl, 100 mL/hr, Last Rate: 100 mL/hr (04/27/24 0234)  HYDROmorphone,       PRN medications  PRN medications: [Held by provider] morphine, naloxone, ondansetron **OR** ondansetron, vancomycin    Relevant Results  Results for orders placed or performed during the hospital encounter of 04/24/24 (from the past 24 hour(s))   CBC   Result Value Ref Range    WBC 19.3 (H) 4.4 - 11.3 x10*3/uL    nRBC 0.0 0.0 - 0.0 /100 WBCs    RBC 3.20 (L) 4.50 - 5.90 x10*6/uL    Hemoglobin 7.9 (L) 13.5 - 17.5 g/dL    Hematocrit 25.6 (L) 41.0 - 52.0 %    MCV 80 80 - 100 fL    MCH 24.7 (L) 26.0 - 34.0 pg    MCHC 30.9 (L) 32.0 - 36.0 g/dL    RDW 13.2 11.5 - 14.5 %    Platelets 497 (H) 150 - 450 x10*3/uL   Comprehensive metabolic panel   Result Value Ref Range    Glucose 134 (H) 74 - 99 mg/dL    Sodium 130 (L) 136 - 145 mmol/L    Potassium 4.1 3.5 - 5.3 mmol/L    Chloride 96 (L) 98 - 107 mmol/L    Bicarbonate 26 21 - 32 mmol/L    Anion Gap 12 10 - 20 mmol/L    Urea Nitrogen 12 6 - 23 mg/dL    Creatinine 1.79 (H) 0.50 - 1.30 mg/dL    eGFR 48 (L) >60 mL/min/1.73m*2    Calcium 7.7 (L) 8.6 - 10.3 mg/dL    Albumin 2.3 (L) 3.4 - 5.0 g/dL    Alkaline Phosphatase 53 33 - 120 U/L    Total Protein 5.0 (L) 6.4 - 8.2 g/dL    AST 14 9 - 39 U/L    Bilirubin, Total 0.5 0.0 - 1.2 mg/dL    ALT 6 (L) 10 - 52 U/L       XR chest 1 view    Result Date: 4/26/2024  Interpreted By:  Reji Senior, STUDY: XR CHEST 1 VIEW;  4/26/2024 1:32 pm   INDICATION: Signs/Symptoms:NG placement confirmation.   COMPARISON: CT scan abdomen and pelvis from 04/24/2024. Chest x-ray from 04/24/2024.   ACCESSION NUMBER(S): [id number]   ORDERING CLINICIAN: SUMA CANAS   TECHNIQUE: Single AP portable view of the chest was obtained.   FINDINGS: MEDIASTINUM/ LUNGS/ HAILEY:   Suboptimal level of inspiration. Mild cardiomegaly. No gross vascular congestion or pleural effusion. No mass or pneumonia in either  lung. No pneumothorax. No tracheal deviation. No abnormal hilar fullness or gross mass on either side.   BONES: No lytic or blastic destructive bone lesion.   UPPER ABDOMEN: Distal tip of a newly placed NG tube is in the lateral proximal stomach. There is mild-to-moderate colonic stool and gas across the transverse colon.       Hypoventilatory exam.   Mild cardiomegaly.   NG tube in place as described.   MACRO: None   Signed by: Reji Senior 4/26/2024 1:45 PM Dictation workstation:   [id number]    ECG 12 Lead    Result Date: 4/24/2024  Sinus tachycardia Minimal voltage criteria for LVH, may be normal variant ( Sokolow-Busch ) Borderline ECG No previous ECGs available See ED provider note for full interpretation and clinical correlation Confirmed by Jyoti Melendrez ([id number]) on 4/24/2024 4:32:39 PM    XR chest 1 view    Result Date: 4/24/2024  Interpreted By:  Joleen Pascual, STUDY: Chest, single AP view.   INDICATION: Signs/Symptoms:Pre-op.   COMPARISON: CT of the abdomen and pelvis study dated 04/24/2024.   ACCESSION NUMBER(S): [id number]   ORDERING CLINICIAN: MEHDI BROWN   FINDINGS: The cardiac silhouette size is within normal limits. There is no focal consolidation, edema or pneumothorax. No sizeable pleural effusion. Calcified granuloma noted in the right mid lung. No acute osseous abnormality.       1. No acute cardiopulmonary process.   MACRO: None.   Signed by: Joleen Pascual 4/24/2024 11:59 AM Dictation workstation:   [id number]    CT abdomen pelvis w IV contrast    Result Date: 4/24/2024  Interpreted By:  Julianne Meneses, STUDY: CT ABDOMEN PELVIS W IV CONTRAST;  4/24/2024 11:00 am   INDICATION: Signs/Symptoms:lower abdominal pain and swelling eval for possible incarcerated hernia.   COMPARISON: None.   ACCESSION NUMBER(S): [id number]   ORDERING CLINICIAN: AILYN PANG   TECHNIQUE: CT of the abdomen and pelvis was performed.  85 mL Omnipaque 350   FINDINGS: LOWER CHEST: Images of the lung  bases show no infiltrate or pleural fluid.   ABDOMEN:   LIVER: There is no hepatic mass.   BILE DUCTS: There is no intrahepatic, common hepatic or common bile ductal dilatation.   GALLBLADDER: The gallbladder is unremarkable.   PANCREAS: The pancreas is unremarkable.   SPLEEN: The spleen is unremarkable. There is no splenic mass or splenomegaly.   ADRENAL GLANDS: The adrenal glands are unremarkable.   KIDNEYS AND URETERS: The kidneys function symmetrically. The kidneys demonstrate no mass. There is no intrarenal calculus or hydronephrosis.     VESSELS: The abdominal and pelvic vessels are unremarkable.   PERITONEUM/RETROPERITONEUM/LYMPH NODES: There is no retroperitoneal or pelvic adenopathy.  There is no ascites.   ABDOMINAL WALL/BOWEL: There is thickening of the right and left rectus abdominis muscles. There is an abscess containing fluid and air in the anterior abdominal wall the in the midline between the rectus abdominis muscles. This measures 9.0 x 2.7 x 3.6 cm in longitudinal, transverse and AP dimensions respectively. There are multiple smaller dots of air in the subcutaneous fat. There is an abscess with an air-fluid level centrally in the pelvis measuring 9.1 x 7.9 x 3.8 cm in longitudinal, transverse and AP dimensions respectively. There are multiple dots of air in the peritoneal cavity around this larger abscess. Findings are consistent with a viscus perforation, likely extending into the anterior abdominal wall. There is a 2.0 x 0.8 cm abscess in the left side of the pelvis with smaller dots of air. There is a thickened loop of sigmoid colon and this is likely perforated sigmoid diverticulitis.   BONE AND SOFT TISSUE: There is no acute osseous finding.   There is no soft tissue abnormality.       1. Central intraabdominal abscess measuring 9.1 x 7.9 x 3.8 cm.. There are multiple dots of air around this larger abscess consistent with localized perforation and viscus perforation. This is likely perforated  "sigmoid diverticulitis. There are smaller abscesses in the abdomen and pelvis.   2. This abscess extends into the anterior abdominal wall between the rectus abdominis muscles.   MACRO: Julianne Gideon discussed the significance and urgency of this critical finding by secure chat with Dionisio Salinas and AILYN PANG on 4/24/2024 at 11:40 am.  (**-RCF-**) Findings:  See findings.   Signed by: Julianne Meneses 4/24/2024 11:41 AM Dictation workstation:   [id number]       Assessment/Plan   Principal Problem:    Bowel perforation (Multi)  Active Problems:    Intra-abdominal abscess (Multi)    Diverticulitis    James Ramirez is a 43 yo male who is admitted with bowel perforation and is POD #3 s/p ex lap, drainage of abdominal wall abscess, partial colon resection with diverting end colostomy with Dr Salinas. Intra-operative findings showed \" This appeared to be a colon cancer that had eroded into the abdominal wall and was densely adherent to the bladder.  Damage control operation was performed with washout placement of drains and diverting end colostomy.\" On exam, pt is ill appearing, clammy, NGT is in place to LIWS bilious content in canister, both DUSTY drains are SS, wound vac is functioning appropriately with SSD in canister. Colostomy Beefy red, moist, producing no stool, + gas, well pouched.    Plan:   POD #3 s/p ex lap, partial colon resection and end colostomy post-op course has been complicated by ileus and HARLEY   - sips of clears  - Jaron when tolerating PO   - NG to LIWS  - continue wound VAC  - DUSTY drain care q shift   - strict I&O  - enterostomal RN and dietician following   - goal to transition off of PCA tomorrow- minimize narcotics as much as possible   - continue mIVF    Renal:   HARLEY  - 1L LR ordered  - trend Cr 1.79 from 0.61  - accurate I&O   - avoid nephrotoxic medications  - BP stable       ID: tmax 38.2  Bowel perforation   - continue vanc and zosyn  - trend WBC 19.3 from 21.1    Dispo: surgery will continue " to follow, pt seen and plan discussed with Dr Jam CAMPOS spent 35 minutes in the professional and overall care of this patient.      NAEEM Her-CNP

## 2024-04-28 ENCOUNTER — APPOINTMENT (OUTPATIENT)
Dept: RADIOLOGY | Facility: HOSPITAL | Age: 44
DRG: 329 | End: 2024-04-28
Payer: COMMERCIAL

## 2024-04-28 LAB
ALBUMIN SERPL BCP-MCNC: 2.3 G/DL (ref 3.4–5)
ALP SERPL-CCNC: 56 U/L (ref 33–120)
ALT SERPL W P-5'-P-CCNC: 6 U/L (ref 10–52)
ANION GAP SERPL CALC-SCNC: 13 MMOL/L (ref 10–20)
AST SERPL W P-5'-P-CCNC: 17 U/L (ref 9–39)
BACTERIA BLD CULT: NORMAL
BACTERIA BLD CULT: NORMAL
BILIRUB SERPL-MCNC: 0.5 MG/DL (ref 0–1.2)
BUN SERPL-MCNC: 22 MG/DL (ref 6–23)
CALCIUM SERPL-MCNC: 7.7 MG/DL (ref 8.6–10.3)
CHLORIDE SERPL-SCNC: 98 MMOL/L (ref 98–107)
CHLORIDE UR-SCNC: 35 MMOL/L
CHLORIDE/CREATININE (MMOL/G) IN URINE: 102 MMOL/G CREAT (ref 23–275)
CO2 SERPL-SCNC: 23 MMOL/L (ref 21–32)
CREAT SERPL-MCNC: 4.27 MG/DL (ref 0.5–1.3)
CREAT UR-MCNC: 34.2 MG/DL (ref 20–370)
EGFRCR SERPLBLD CKD-EPI 2021: 17 ML/MIN/1.73M*2
ERYTHROCYTE [DISTWIDTH] IN BLOOD BY AUTOMATED COUNT: 13.5 % (ref 11.5–14.5)
GLUCOSE SERPL-MCNC: 114 MG/DL (ref 74–99)
HCT VFR BLD AUTO: 26.5 % (ref 41–52)
HGB BLD-MCNC: 8.4 G/DL (ref 13.5–17.5)
MCH RBC QN AUTO: 25 PG (ref 26–34)
MCHC RBC AUTO-ENTMCNC: 31.7 G/DL (ref 32–36)
MCV RBC AUTO: 79 FL (ref 80–100)
NRBC BLD-RTO: 0 /100 WBCS (ref 0–0)
PLATELET # BLD AUTO: 529 X10*3/UL (ref 150–450)
POTASSIUM SERPL-SCNC: 4.1 MMOL/L (ref 3.5–5.3)
POTASSIUM UR-SCNC: 8 MMOL/L
POTASSIUM/CREAT UR-RTO: 23 MMOL/G CREAT
PROT SERPL-MCNC: 5.4 G/DL (ref 6.4–8.2)
RBC # BLD AUTO: 3.36 X10*6/UL (ref 4.5–5.9)
SODIUM SERPL-SCNC: 130 MMOL/L (ref 136–145)
SODIUM UR-SCNC: 42 MMOL/L
SODIUM/CREAT UR-RTO: 123 MMOL/G CREAT
VANCOMYCIN SERPL-MCNC: 39.3 UG/ML (ref 5–20)
WBC # BLD AUTO: 17 X10*3/UL (ref 4.4–11.3)

## 2024-04-28 PROCEDURE — 82436 ASSAY OF URINE CHLORIDE: CPT | Performed by: INTERNAL MEDICINE

## 2024-04-28 PROCEDURE — 85027 COMPLETE CBC AUTOMATED: CPT

## 2024-04-28 PROCEDURE — 76770 US EXAM ABDO BACK WALL COMP: CPT

## 2024-04-28 PROCEDURE — 2500000004 HC RX 250 GENERAL PHARMACY W/ HCPCS (ALT 636 FOR OP/ED): Performed by: INTERNAL MEDICINE

## 2024-04-28 PROCEDURE — C9113 INJ PANTOPRAZOLE SODIUM, VIA: HCPCS | Performed by: INTERNAL MEDICINE

## 2024-04-28 PROCEDURE — 82570 ASSAY OF URINE CREATININE: CPT | Performed by: INTERNAL MEDICINE

## 2024-04-28 PROCEDURE — 99233 SBSQ HOSP IP/OBS HIGH 50: CPT | Performed by: INTERNAL MEDICINE

## 2024-04-28 PROCEDURE — 76770 US EXAM ABDO BACK WALL COMP: CPT | Performed by: RADIOLOGY

## 2024-04-28 PROCEDURE — 71046 X-RAY EXAM CHEST 2 VIEWS: CPT | Performed by: RADIOLOGY

## 2024-04-28 PROCEDURE — 84133 ASSAY OF URINE POTASSIUM: CPT | Performed by: INTERNAL MEDICINE

## 2024-04-28 PROCEDURE — 2500000004 HC RX 250 GENERAL PHARMACY W/ HCPCS (ALT 636 FOR OP/ED): Performed by: SURGERY

## 2024-04-28 PROCEDURE — 80202 ASSAY OF VANCOMYCIN: CPT | Performed by: INTERNAL MEDICINE

## 2024-04-28 PROCEDURE — 2500000001 HC RX 250 WO HCPCS SELF ADMINISTERED DRUGS (ALT 637 FOR MEDICARE OP): Performed by: INTERNAL MEDICINE

## 2024-04-28 PROCEDURE — 71046 X-RAY EXAM CHEST 2 VIEWS: CPT

## 2024-04-28 PROCEDURE — 84156 ASSAY OF PROTEIN URINE: CPT | Performed by: INTERNAL MEDICINE

## 2024-04-28 PROCEDURE — 36415 COLL VENOUS BLD VENIPUNCTURE: CPT

## 2024-04-28 PROCEDURE — 9420000001 HC RT PATIENT EDUCATION 5 MIN

## 2024-04-28 PROCEDURE — 1200000002 HC GENERAL ROOM WITH TELEMETRY DAILY

## 2024-04-28 PROCEDURE — 81001 URINALYSIS AUTO W/SCOPE: CPT | Performed by: INTERNAL MEDICINE

## 2024-04-28 PROCEDURE — 80053 COMPREHEN METABOLIC PANEL: CPT

## 2024-04-28 PROCEDURE — 99232 SBSQ HOSP IP/OBS MODERATE 35: CPT

## 2024-04-28 PROCEDURE — 2500000004 HC RX 250 GENERAL PHARMACY W/ HCPCS (ALT 636 FOR OP/ED): Performed by: STUDENT IN AN ORGANIZED HEALTH CARE EDUCATION/TRAINING PROGRAM

## 2024-04-28 RX ADMIN — HEPARIN SODIUM 5000 UNITS: 5000 INJECTION INTRAVENOUS; SUBCUTANEOUS at 21:56

## 2024-04-28 RX ADMIN — PIPERACILLIN SODIUM AND TAZOBACTAM SODIUM 2.25 G: 2; .25 INJECTION, SOLUTION INTRAVENOUS at 16:30

## 2024-04-28 RX ADMIN — PIPERACILLIN SODIUM AND TAZOBACTAM SODIUM 3.38 G: 3; .375 INJECTION, SOLUTION INTRAVENOUS at 03:43

## 2024-04-28 RX ADMIN — PIPERACILLIN SODIUM AND TAZOBACTAM SODIUM 2.25 G: 2; .25 INJECTION, SOLUTION INTRAVENOUS at 21:56

## 2024-04-28 RX ADMIN — HEPARIN SODIUM 5000 UNITS: 5000 INJECTION INTRAVENOUS; SUBCUTANEOUS at 06:27

## 2024-04-28 RX ADMIN — ACETAMINOPHEN 650 MG: 325 TABLET ORAL at 09:26

## 2024-04-28 RX ADMIN — HEPARIN SODIUM 5000 UNITS: 5000 INJECTION INTRAVENOUS; SUBCUTANEOUS at 13:00

## 2024-04-28 RX ADMIN — POTASSIUM CHLORIDE, DEXTROSE MONOHYDRATE AND SODIUM CHLORIDE 100 ML/HR: 150; 5; 450 INJECTION, SOLUTION INTRAVENOUS at 16:30

## 2024-04-28 RX ADMIN — PANTOPRAZOLE SODIUM 40 MG: 40 INJECTION, POWDER, FOR SOLUTION INTRAVENOUS at 06:27

## 2024-04-28 RX ADMIN — POLYETHYLENE GLYCOL 3350 17 G: 17 POWDER, FOR SOLUTION ORAL at 09:26

## 2024-04-28 RX ADMIN — PIPERACILLIN SODIUM AND TAZOBACTAM SODIUM 3.38 G: 3; .375 INJECTION, SOLUTION INTRAVENOUS at 09:25

## 2024-04-28 RX ADMIN — DOCUSATE SODIUM 100 MG: 100 CAPSULE, LIQUID FILLED ORAL at 09:25

## 2024-04-28 ASSESSMENT — PAIN - FUNCTIONAL ASSESSMENT
PAIN_FUNCTIONAL_ASSESSMENT: 0-10

## 2024-04-28 ASSESSMENT — COGNITIVE AND FUNCTIONAL STATUS - GENERAL
MOBILITY SCORE: 24
DAILY ACTIVITIY SCORE: 24

## 2024-04-28 ASSESSMENT — PAIN SCALES - GENERAL
PAINLEVEL_OUTOF10: 5 - MODERATE PAIN
PAINLEVEL_OUTOF10: 4
PAINLEVEL_OUTOF10: 6
PAINLEVEL_OUTOF10: 5 - MODERATE PAIN

## 2024-04-28 ASSESSMENT — PAIN SCALES - WONG BAKER: WONGBAKER_NUMERICALRESPONSE: NO HURT

## 2024-04-28 ASSESSMENT — PAIN DESCRIPTION - DESCRIPTORS
DESCRIPTORS: TIGHTNESS;OTHER (COMMENT)
DESCRIPTORS: TIGHTNESS;OTHER (COMMENT)
DESCRIPTORS: ACHING

## 2024-04-28 NOTE — PROGRESS NOTES
"James Ramirez is a 44 y.o. male on day 4 of admission presenting with Bowel perforation (Multi).    Subjective   No overnight events. Patient reports doing alright. NG yielded 800cc/24 hr. Stoma has not opened up yet but there is air in pouch. Has voided independently around 300 as documented in chart.    Objective   PE:  Constitutional: A&Ox3, calm and cooperative, NAD  Eyes: PERRL, clear sclera   Head/Neck: Neck supple  Cardiovascular: Normal rate and regular rhythm.  Respiratory/Thorax: Good symmetric chest expansion. No labored breathing.  Gastrointestinal: Abdomen slightly distended, soft, appropriately tender, no peritoneal signs, laparotomy sites c/d/i, well approximate with Dermabond, no erythema or drainage. Stoma pink with bowel sweat in pouch and air. L DUSTY 8cc/24hr, R DUSTY 40cc/24hr. Prevena in place.   Genitourinary: Voiding independently , around 300cc/24hr  Musculoskeletal: ROM intact  Extremities: No peripheral edema  Neurological: A&Ox3, No focal deficits  Psychological: Appropriate mood and behavior  Skin: Warm and dry      Last Recorded Vitals  Blood pressure (!) 135/95, pulse 84, temperature 36.8 °C (98.2 °F), temperature source Oral, resp. rate 16, height 1.83 m (6' 0.05\"), weight 74.8 kg (165 lb), SpO2 97%.  Intake/Output last 3 Shifts:  I/O last 3 completed shifts:  In: 0.8 (0 mL/kg) [I.V.:0.8 (0 mL/kg)]  Out: 1903 (25.4 mL/kg) [Urine:300 (0.1 mL/kg/hr); Emesis/NG output:1450; Drains:153]  Weight: 74.8 kg     Relevant Results  Scheduled medications  acetaminophen, 650 mg, nasogastric tube, q6h   Or  acetaminophen, 650 mg, nasogastric tube, q6h   Or  acetaminophen, 650 mg, rectal, q6h  docusate sodium, 100 mg, oral, BID  heparin (porcine), 5,000 Units, subcutaneous, q8h LILY  pantoprazole, 40 mg, intravenous, Daily before breakfast  phenylephrine, 1 spray, Each Nostril, Once  piperacillin-tazobactam, 3.375 g, intravenous, q6h  polyethylene glycol, 17 g, oral, Daily      Continuous " medications  potassium gbrxajh-J8-8.45%NaCl, 100 mL/hr, Last Rate: 100 mL/hr (04/27/24 4898)  HYDROmorphone,       PRN medications  PRN medications: benzocaine-menthol, [Held by provider] morphine, naloxone, ondansetron **OR** ondansetron    Results for orders placed or performed during the hospital encounter of 04/24/24 (from the past 24 hour(s))   Vancomycin   Result Value Ref Range    Vancomycin 43.7 (H) 5.0 - 20.0 ug/mL   CBC   Result Value Ref Range    WBC 17.0 (H) 4.4 - 11.3 x10*3/uL    nRBC 0.0 0.0 - 0.0 /100 WBCs    RBC 3.36 (L) 4.50 - 5.90 x10*6/uL    Hemoglobin 8.4 (L) 13.5 - 17.5 g/dL    Hematocrit 26.5 (L) 41.0 - 52.0 %    MCV 79 (L) 80 - 100 fL    MCH 25.0 (L) 26.0 - 34.0 pg    MCHC 31.7 (L) 32.0 - 36.0 g/dL    RDW 13.5 11.5 - 14.5 %    Platelets 529 (H) 150 - 450 x10*3/uL   Comprehensive metabolic panel   Result Value Ref Range    Glucose 114 (H) 74 - 99 mg/dL    Sodium 130 (L) 136 - 145 mmol/L    Potassium 4.1 3.5 - 5.3 mmol/L    Chloride 98 98 - 107 mmol/L    Bicarbonate 23 21 - 32 mmol/L    Anion Gap 13 10 - 20 mmol/L    Urea Nitrogen 22 6 - 23 mg/dL    Creatinine 4.27 (H) 0.50 - 1.30 mg/dL    eGFR 17 (L) >60 mL/min/1.73m*2    Calcium 7.7 (L) 8.6 - 10.3 mg/dL    Albumin 2.3 (L) 3.4 - 5.0 g/dL    Alkaline Phosphatase 56 33 - 120 U/L    Total Protein 5.4 (L) 6.4 - 8.2 g/dL    AST 17 9 - 39 U/L    Bilirubin, Total 0.5 0.0 - 1.2 mg/dL    ALT 6 (L) 10 - 52 U/L   Vancomycin   Result Value Ref Range    Vancomycin 39.3 (H) 5.0 - 20.0 ug/mL     XR chest 1 view   Final Result   Hypoventilatory exam.        Mild cardiomegaly.        NG tube in place as described.        MACRO:   None        Signed by: Reji Senior 4/26/2024 1:45 PM   Dictation workstation:   [id number]      XR chest 1 view   Final Result   1. No acute cardiopulmonary process.        MACRO:   None.        Signed by: Joleen Pascual 4/24/2024 11:59 AM   Dictation workstation:   [id number]      CT abdomen pelvis w IV contrast   Final Result    1. Central intraabdominal abscess measuring 9.1 x 7.9 x 3.8 cm..   There are multiple dots of air around this larger abscess consistent   with localized perforation and viscus perforation. This is likely   perforated sigmoid diverticulitis. There are smaller abscesses in the   abdomen and pelvis.        2. This abscess extends into the anterior abdominal wall between the   rectus abdominis muscles.        MACRO:   Julianne Gideon discussed the significance and urgency of this   critical finding by secure chat with Dionisio Salinas and AILYN PANG   on 4/24/2024 at 11:40 am.  (**-RCF-**) Findings:  See findings.        Signed by: Julianne Meneses 4/24/2024 11:41 AM   Dictation workstation:   [id number]      XR chest 2 views    (Results Pending)       Assessment/Plan   Principal Problem:    Bowel perforation (Multi)  Active Problems:    Intra-abdominal abscess (Multi)    Diverticulitis    Acute kidney injury (CMS-HCC)    Patient presented with abdominal pain and was discovered to have a bowel perforation and intraabdominal abscess. He is now POD4 ex lap, drainage of abdominal abscess, left colonic resection, DLI with Dr Salinas. Course has been complicated with ileus and HARLEY. He had a lower-grade fever last night. His Cr is now 4.27 from 1.79. WBC 17.0 from 19.3. Hgb 8.4 from 7.9.    Plan:  - NPO  - NG LIWS  - Encouraged OOB/ambulation  - Encouraged IS every hour while awake  - DUSTY drain I/Os  - Maintain VAC  - Enterostomal nurse following  - Dietician following  - PT/OT  - Has PCA, hopefully can transition off by tomorrow    Renal: HARLEY  - 1L LR given 4/27  - Will need strict I/Os  - Continue IVF  - Avoid nephrotoxic agents  - Nephrology    ID: Afebrile at time of interview, low-grade at 99.5 last night  - WBC 17.0 from 19.3  - ID following- IV zosyn, discontinued vancomycin, blood cx currently NGTD    Dispo: Continue care on nursing floor, surgery to follow post-operatively.    Discussed with Dr Polk.    I spent 35  minutes in the professional and overall care of this patient.    Blayne Villar PA-C

## 2024-04-28 NOTE — CONSULTS
Reason For Consult  HARLEY    History Of Present Illness  James Ramirez is a 44 y.o. male presenting with abdominal pain due to history of diverticulitis.  Evaluated by general surgery and found to have bowel perforation, patient had emergent surgery and antibiotics started for his abdominal abscess/diverticulitis.  He had partial colon resection with diverting end colostomy with general surgery on 4-.  Patient was placed on Zosyn and vancomycin infectious disease with no new complaintsSpecialists.  Nephrology consulted for HARLEY.       Past Medical History  He has no past medical history on file.    Surgical History  He has a past surgical history that includes Hernia repair and Other surgical history.     Social History  He reports that he has never smoked. He has never been exposed to tobacco smoke. He has never used smokeless tobacco. He reports current drug use. Drug: Marijuana. No history on file for alcohol use.    Family History  No family history on file.     Allergies  Patient has no known allergies.    Review of Systems  All systems were reviewed     Physical Exam  GEN appearance: Awake alert no acute distress  Head and ENT: Normocephalic/atraumatic/supple neck/moderate ED  Lungs: Clear to auscultation  Heart: RRR  Abdomen; surgical dressing and drainage noted  Extremities; no edema  Neurologic; physiologic         I&O 24HR    Intake/Output Summary (Last 24 hours) at 4/28/2024 1413  Last data filed at 4/28/2024 1200  Gross per 24 hour   Intake 600.8 ml   Output 1278 ml   Net -677.2 ml       Vitals 24HR  Heart Rate:  [80-93]   Temp:  [36.8 °C (98.2 °F)-37.5 °C (99.5 °F)]   Resp:  [16-19]   BP: (117-151)/(81-95)   SpO2:  [95 %-99 %]         Relevant Results  Results from last 7 days   Lab Units 04/28/24  0602 04/27/24  0540 04/26/24  0458   WBC AUTO x10*3/uL 17.0* 19.3* 21.1*   HEMOGLOBIN g/dL 8.4* 7.9* 8.8*   HEMATOCRIT % 26.5* 25.6* 28.1*   PLATELETS AUTO x10*3/uL 529* 497* 560*      Results from last 7  days   Lab Units 04/28/24  0602 04/27/24  0540 04/26/24  0458   SODIUM mmol/L 130* 130* 131*   POTASSIUM mmol/L 4.1 4.1 4.0   CHLORIDE mmol/L 98 96* 96*   CO2 mmol/L 23 26 26   BUN mg/dL 22 12 7   CREATININE mg/dL 4.27* 1.79* 0.61   GLUCOSE mg/dL 114* 134* 171*   CALCIUM mg/dL 7.7* 7.7* 7.5*    XR chest 1 view    Result Date: 4/26/2024  Interpreted By:  Reji Senior, STUDY: XR CHEST 1 VIEW;  4/26/2024 1:32 pm   INDICATION: Signs/Symptoms:NG placement confirmation.   COMPARISON: CT scan abdomen and pelvis from 04/24/2024. Chest x-ray from 04/24/2024.   ACCESSION NUMBER(S): [id number]   ORDERING CLINICIAN: SUMA CANAS   TECHNIQUE: Single AP portable view of the chest was obtained.   FINDINGS: MEDIASTINUM/ LUNGS/ HAILEY:   Suboptimal level of inspiration. Mild cardiomegaly. No gross vascular congestion or pleural effusion. No mass or pneumonia in either lung. No pneumothorax. No tracheal deviation. No abnormal hilar fullness or gross mass on either side.   BONES: No lytic or blastic destructive bone lesion.   UPPER ABDOMEN: Distal tip of a newly placed NG tube is in the lateral proximal stomach. There is mild-to-moderate colonic stool and gas across the transverse colon.       Hypoventilatory exam.   Mild cardiomegaly.   NG tube in place as described.   MACRO: None   Signed by: Reji Senior 4/26/2024 1:45 PM Dictation workstation:   [id number]    ECG 12 Lead    Result Date: 4/24/2024  Sinus tachycardia Minimal voltage criteria for LVH, may be normal variant ( Sokolow-Busch ) Borderline ECG No previous ECGs available See ED provider note for full interpretation and clinical correlation Confirmed by Jyoti Melendrez ([id number]) on 4/24/2024 4:32:39 PM    XR chest 1 view    Result Date: 4/24/2024  Interpreted By:  Joleen Pascual, STUDY: Chest, single AP view.   INDICATION: Signs/Symptoms:Pre-op.   COMPARISON: CT of the abdomen and pelvis study dated 04/24/2024.   ACCESSION NUMBER(S): [id number]   ORDERING CLINICIAN:  MEHDI KINKOPF   FINDINGS: The cardiac silhouette size is within normal limits. There is no focal consolidation, edema or pneumothorax. No sizeable pleural effusion. Calcified granuloma noted in the right mid lung. No acute osseous abnormality.       1. No acute cardiopulmonary process.   MACRO: None.   Signed by: Joleen Pascual 4/24/2024 11:59 AM Dictation workstation:   [id number]    CT abdomen pelvis w IV contrast    Result Date: 4/24/2024  Interpreted By:  Julianne Meneses, STUDY: CT ABDOMEN PELVIS W IV CONTRAST;  4/24/2024 11:00 am   INDICATION: Signs/Symptoms:lower abdominal pain and swelling eval for possible incarcerated hernia.   COMPARISON: None.   ACCESSION NUMBER(S): [id number]   ORDERING CLINICIAN: AILYN PANG   TECHNIQUE: CT of the abdomen and pelvis was performed.  85 mL Omnipaque 350   FINDINGS: LOWER CHEST: Images of the lung bases show no infiltrate or pleural fluid.   ABDOMEN:   LIVER: There is no hepatic mass.   BILE DUCTS: There is no intrahepatic, common hepatic or common bile ductal dilatation.   GALLBLADDER: The gallbladder is unremarkable.   PANCREAS: The pancreas is unremarkable.   SPLEEN: The spleen is unremarkable. There is no splenic mass or splenomegaly.   ADRENAL GLANDS: The adrenal glands are unremarkable.   KIDNEYS AND URETERS: The kidneys function symmetrically. The kidneys demonstrate no mass. There is no intrarenal calculus or hydronephrosis.     VESSELS: The abdominal and pelvic vessels are unremarkable.   PERITONEUM/RETROPERITONEUM/LYMPH NODES: There is no retroperitoneal or pelvic adenopathy.  There is no ascites.   ABDOMINAL WALL/BOWEL: There is thickening of the right and left rectus abdominis muscles. There is an abscess containing fluid and air in the anterior abdominal wall the in the midline between the rectus abdominis muscles. This measures 9.0 x 2.7 x 3.6 cm in longitudinal, transverse and AP dimensions respectively. There are multiple smaller dots of air in the  subcutaneous fat. There is an abscess with an air-fluid level centrally in the pelvis measuring 9.1 x 7.9 x 3.8 cm in longitudinal, transverse and AP dimensions respectively. There are multiple dots of air in the peritoneal cavity around this larger abscess. Findings are consistent with a viscus perforation, likely extending into the anterior abdominal wall. There is a 2.0 x 0.8 cm abscess in the left side of the pelvis with smaller dots of air. There is a thickened loop of sigmoid colon and this is likely perforated sigmoid diverticulitis.   BONE AND SOFT TISSUE: There is no acute osseous finding.   There is no soft tissue abnormality.       1. Central intraabdominal abscess measuring 9.1 x 7.9 x 3.8 cm.. There are multiple dots of air around this larger abscess consistent with localized perforation and viscus perforation. This is likely perforated sigmoid diverticulitis. There are smaller abscesses in the abdomen and pelvis.   2. This abscess extends into the anterior abdominal wall between the rectus abdominis muscles.   MACRO: Julianne Meneses discussed the significance and urgency of this critical finding by secure chat with Dionisio Salinas and AILYN PANG on 4/24/2024 at 11:40 am.  (**-RCF-**) Findings:  See findings.   Signed by: Julianne Meneses 4/24/2024 11:41 AM Dictation workstation:   [id number]       Assessment/Plan   1.  HARLEY possible secondary to sepsis/ATN.  Continue current fluid hydration and medications as being done.  Will follow labs closely on a daily basis checking urine lites and chemistry profile and BMP and CBC.  If condition deteriorates with acidosis or hyperkalemia will consider more interventional treatment with dialysis that would be temporary.  Avoid nonsteroidal inflammatory medications/contrast dye/nephrotoxic medications/  Aminoglycosides.  2.  Bowel perforation with abscess formation, diverticulitis  3.  Diverticulitis    Principal Problem:    Bowel perforation (Multi)  Active  Problems:    Intra-abdominal abscess (Multi)    Diverticulitis    Acute kidney injury (CMS-HCC)      I spent 54 minutes in the professional and overall care of this patient.      Bella Reeves MD

## 2024-04-28 NOTE — PROGRESS NOTES
"James Ramirez is a 44 y.o. male on day 4 of admission presenting with Bowel perforation (Multi).    Subjective   Patient is having less pain in the abdomen.  No output from the ileostomy tube       Objective     Physical Exam  Vitals reviewed.   Constitutional:       Appearance: Normal appearance.   HENT:      Head: Normocephalic and atraumatic.      Right Ear: Tympanic membrane, ear canal and external ear normal.      Left Ear: Tympanic membrane, ear canal and external ear normal.      Nose: Nose normal.      Mouth/Throat:      Pharynx: Oropharynx is clear.   Eyes:      Extraocular Movements: Extraocular movements intact.      Conjunctiva/sclera: Conjunctivae normal.      Pupils: Pupils are equal, round, and reactive to light.   Cardiovascular:      Rate and Rhythm: Normal rate and regular rhythm.      Pulses: Normal pulses.      Heart sounds: Normal heart sounds.   Pulmonary:      Effort: Pulmonary effort is normal.      Breath sounds: Normal breath sounds.   Abdominal:      General: Abdomen is flat. Bowel sounds are normal.      Palpations: Abdomen is soft.      Tenderness: There is abdominal tenderness.      Comments: No output in ileostomy   Musculoskeletal:      Cervical back: Normal range of motion and neck supple.   Skin:     General: Skin is warm and dry.   Neurological:      General: No focal deficit present.      Mental Status: He is alert and oriented to person, place, and time.   Psychiatric:         Mood and Affect: Mood normal.         Last Recorded Vitals  Blood pressure 142/88, pulse 82, temperature 36.7 °C (98.1 °F), temperature source Oral, resp. rate 16, height 1.83 m (6' 0.05\"), weight 74.8 kg (165 lb), SpO2 99%.  Intake/Output last 3 Shifts:  I/O last 3 completed shifts:  In: 600.8 (8 mL/kg) [I.V.:0.8 (0 mL/kg); IV Piggyback:600]  Out: 1328 (17.7 mL/kg) [Urine:400 (0.1 mL/kg/hr); Emesis/NG output:800; Drains:128]  Weight: 74.8 kg     Relevant Results              Scheduled " medications  acetaminophen, 650 mg, nasogastric tube, q6h   Or  acetaminophen, 650 mg, nasogastric tube, q6h   Or  acetaminophen, 650 mg, rectal, q6h  docusate sodium, 100 mg, oral, BID  heparin (porcine), 5,000 Units, subcutaneous, q8h LILY  pantoprazole, 40 mg, intravenous, Daily before breakfast  phenylephrine, 1 spray, Each Nostril, Once  piperacillin-tazobactam, 2.25 g, intravenous, q6h  polyethylene glycol, 17 g, oral, Daily      Continuous medications  potassium hvfpbwu-Z9-6.45%NaCl, 100 mL/hr, Last Rate: 100 mL/hr (04/28/24 1630)  HYDROmorphone,       PRN medications  PRN medications: benzocaine-menthol, [Held by provider] morphine, naloxone, ondansetron **OR** ondansetron  Results for orders placed or performed during the hospital encounter of 04/24/24 (from the past 24 hour(s))   CBC   Result Value Ref Range    WBC 17.0 (H) 4.4 - 11.3 x10*3/uL    nRBC 0.0 0.0 - 0.0 /100 WBCs    RBC 3.36 (L) 4.50 - 5.90 x10*6/uL    Hemoglobin 8.4 (L) 13.5 - 17.5 g/dL    Hematocrit 26.5 (L) 41.0 - 52.0 %    MCV 79 (L) 80 - 100 fL    MCH 25.0 (L) 26.0 - 34.0 pg    MCHC 31.7 (L) 32.0 - 36.0 g/dL    RDW 13.5 11.5 - 14.5 %    Platelets 529 (H) 150 - 450 x10*3/uL   Comprehensive metabolic panel   Result Value Ref Range    Glucose 114 (H) 74 - 99 mg/dL    Sodium 130 (L) 136 - 145 mmol/L    Potassium 4.1 3.5 - 5.3 mmol/L    Chloride 98 98 - 107 mmol/L    Bicarbonate 23 21 - 32 mmol/L    Anion Gap 13 10 - 20 mmol/L    Urea Nitrogen 22 6 - 23 mg/dL    Creatinine 4.27 (H) 0.50 - 1.30 mg/dL    eGFR 17 (L) >60 mL/min/1.73m*2    Calcium 7.7 (L) 8.6 - 10.3 mg/dL    Albumin 2.3 (L) 3.4 - 5.0 g/dL    Alkaline Phosphatase 56 33 - 120 U/L    Total Protein 5.4 (L) 6.4 - 8.2 g/dL    AST 17 9 - 39 U/L    Bilirubin, Total 0.5 0.0 - 1.2 mg/dL    ALT 6 (L) 10 - 52 U/L   Vancomycin   Result Value Ref Range    Vancomycin 39.3 (H) 5.0 - 20.0 ug/mL     US renal complete    Result Date: 4/28/2024  Interpreted By:  Ciro Patel, STUDY: US RENAL COMPLETE  4/28/2024 4:01 pm   INDICATION: 43 y/o  M with Signs/Symptoms:Worsening renal function tests ordered obstruction.   COMPARISON: None.   ACCESSION NUMBER(S): [id number]   ORDERING CLINICIAN: YASHIRA MOTA   TECHNIQUE: Multiple grayscale ultrasonographic images were obtained through the kidneys and urinary bladder.   FINDINGS: RIGHT KIDNEY: The right kidney is  within normal limits for size, measuring at up to  11.5 cm in length. There is  significantly increased cortical echogenicity within the kidney, which can be seen with medical renal disease.   There is no hydronephrosis or hydroureter identified.   No definite focal renal mass is seen.   No discrete renal calculi are identified.   LEFT KIDNEY: The left kidney is  within normal limits for size, measuring at up to 11.6 cm in length.  There is  significantly increased cortical echogenicity within the kidney, which can be seen with medical renal disease.   There is no hydronephrosis or hydroureter identified.   No definite focal renal mass is seen.   A nonobstructive calculus is seen in the left kidney, measuring up to 7 mm in diameter.   BLADDER: The urinary bladder is decompressed.       1.  No hydronephrosis or hydroureter bilaterally. 2. Nonobstructive calculus in the left kidney. 3.  Increased cortical echogenicity within the kidneys bilaterally, which can be seen with medical renal disease.   Signed by: Ciro Patel 4/28/2024 5:50 PM Dictation workstation:   [id number]                  Assessment/Plan   Principal Problem:    Bowel perforation (Multi)  Active Problems:    Intra-abdominal abscess (Multi)    Diverticulitis    Acute kidney injury (CMS-[facility])    Nephrology consulted as patient's creatinine went up  HARLEY possibly secondary to sepsis  Continue IV fluids  Vancomycin levels high  DC vancomycin  DUSTY drain in place  Blood pressure stable  White cell count is coming down       I spent  minutes in the professional and overall care of this  patient.      Zahira Miller MD

## 2024-04-28 NOTE — PROGRESS NOTES
Care Coordinator Note:    Plan: patient POD #4 perf bowel. Ostomy, DUSTY and NG to LIWS, Wound vac remain intact. TCC spoke with patient regarding homecare RN and PT. Wants to decide closer to dc if he feels he will need.     Status: inpatient  Payor:   Disposition: Home with father ? NEW C RN PT  Barrier: Surg clearance. Drains out. Tolerating diet  ADOD: few days    Madonna Duran TCC      04/28/24 1130   Discharge Planning   Patient expects to be discharged to: Home with Father. ?NEW C RN PT

## 2024-04-28 NOTE — CONSULTS
Inpatient consult to Infectious Diseases  Consult performed by: Alyssa Jurado DO  Consult ordered by: Zahira Miller MD          Referred by Dr Paul Zuniga MD: Vanessa Miller MD    Reason For Consult  Intraabd abscess    History Of Present Illness  James Ramirez is a 44 y.o. male presenting with abd pain, found to have bowel perforation with intraabdominal abscess/diverticulitis.    He underwent Ex lap with drainage of the abd wall abscess and partial colon resection with diverting end colostomy with General Surgery on 4/24/24. Per surgery notes, concern for possible underlying malignancy but path pending    He has been continued on pip tazo and vancomycin    Wound cultures from the OR with mixed bacteria. Blood cultures remain negative    Intermittent fevers but overall improved    Evaluated the patient with the surgery team today who notes he has NG tube due to ileus     Past Medical History  He has no past medical history on file.    Surgical History  He has a past surgical history that includes Hernia repair and Other surgical history.     Social History  He reports that he has never smoked. He has never been exposed to tobacco smoke. He has never used smokeless tobacco. He reports current drug use. Drug: Marijuana. No history on file for alcohol use.   Travel Screening       Question Response    Do you have any of the following new or worsening symptoms? None of these    In the last 10 days, have you been in contact with someone who was confirmed or suspected to have Coronavirus/COVID-19? No / Unsure    Have you had a COVID-19 viral test in the last 10 days? No    Have you traveled internationally or domestically in the last month? No          Travel History   Travel since 03/28/24    No documented travel since 03/28/24           Family History  No family history on file.  Allergies  Patient has no known allergies.       There is no immunization history on file for this patient.  Review of  "Systems  +abd pain but improved     Physical Exam  General: AAOx3, NAD, nontoxic appearing  Eyes: PERRL, EOMI, no scleral icterus  ENT: NG tube  CV: RRR, +S1/S2  Resp: lungs CTA b/l, normal resp effort  Abd: soft, +TTP, wound vac, 2 DUSTY drains, ostomy  Ext: no edema  Skin: no rashes      Range of Vitals (last 24 hours)  Heart Rate:  [80-95]   Temp:  [37 °C (98.6 °F)-37.5 °C (99.5 °F)]   Resp:  [16-20]   BP: (117-151)/(81-94)   SpO2:  [95 %-99 %]     Relevant Results  Lab Results   Component Value Date    WBC 19.3 (H) 04/27/2024    HGB 7.9 (L) 04/27/2024    HCT 25.6 (L) 04/27/2024    MCV 80 04/27/2024     (H) 04/27/2024      Results from last 72 hours   Lab Units 04/27/24  0540   SODIUM mmol/L 130*   POTASSIUM mmol/L 4.1   CHLORIDE mmol/L 96*   CO2 mmol/L 26   BUN mg/dL 12   CREATININE mg/dL 1.79*   GLUCOSE mg/dL 134*   CALCIUM mg/dL 7.7*   ANION GAP mmol/L 12   EGFR mL/min/1.73m*2 48*     Results from last 72 hours   Lab Units 04/27/24  0540   ALK PHOS U/L 53   BILIRUBIN TOTAL mg/dL 0.5   PROTEIN TOTAL g/dL 5.0*   ALT U/L 6*   AST U/L 14   ALBUMIN g/dL 2.3*     Estimated Creatinine Clearance: 56.9 mL/min (A) (by C-G formula based on SCr of 1.79 mg/dL (H)).  No results found for: \"CRP\", \"SEDRATE\"  No results found for: \"HIV1X2\", \"HIVCONF\", \"PZLCBT4NL\"  No results found for: \"HCVPCRQUANT\"    Cultures/Micro  Susceptibility data from last 14 days.  Collected Specimen Info Organism   04/24/24 Swab from ABSCESS Mixed Anaerobic Bacteria     Mixed Gram-Positive and Gram-Negative Bacteria     Imaging:  CT A/P  IMPRESSION:  1. Central intraabdominal abscess measuring 9.1 x 7.9 x 3.8 cm..  There are multiple dots of air around this larger abscess consistent  with localized perforation and viscus perforation. This is likely  perforated sigmoid diverticulitis. There are smaller abscesses in the  abdomen and pelvis.      2. This abscess extends into the anterior abdominal wall between the  rectus abdominis " muscles.    Assessment:  Intraabdominal abscess due to bowel perforation/perforated diverticulitis s/p ex lap with partial colectomy on 4/24/24    Plan/Recommendations:  Continue pip tazo  Stop vancomycin  Blood cultures currently NGTD  Follow up OR cultures  Will follow    Discussed with surgery team    Alyssa Jurado DO  [facility], [place]  #[phone]

## 2024-04-28 NOTE — CONSULTS
Vancomycin Dosing by Pharmacy- Cessation of Therapy    Consult to pharmacy for vancomycin dosing has been discontinued by the prescriber, pharmacy will sign off at this time.    Please call pharmacy if there are further questions or re-enter a consult if vancomycin is resumed.     Raisa Javier [facility]

## 2024-04-29 ENCOUNTER — APPOINTMENT (OUTPATIENT)
Dept: CARDIOLOGY | Facility: HOSPITAL | Age: 44
DRG: 329 | End: 2024-04-29
Payer: COMMERCIAL

## 2024-04-29 ENCOUNTER — APPOINTMENT (OUTPATIENT)
Dept: RADIOLOGY | Facility: HOSPITAL | Age: 44
DRG: 329 | End: 2024-04-29
Payer: COMMERCIAL

## 2024-04-29 LAB
ALBUMIN SERPL BCP-MCNC: 2.3 G/DL (ref 3.4–5)
ANION GAP SERPL CALC-SCNC: 14 MMOL/L (ref 10–20)
ANION GAP SERPL CALC-SCNC: 16 MMOL/L (ref 10–20)
APPEARANCE UR: ABNORMAL
BACTERIA #/AREA URNS AUTO: ABNORMAL /HPF
BASOPHILS # BLD AUTO: 0.03 X10*3/UL (ref 0–0.1)
BASOPHILS NFR BLD AUTO: 0.2 %
BILIRUB UR STRIP.AUTO-MCNC: NEGATIVE MG/DL
BUN SERPL-MCNC: 33 MG/DL (ref 6–23)
BUN SERPL-MCNC: 34 MG/DL (ref 6–23)
CALCIUM SERPL-MCNC: 7.8 MG/DL (ref 8.6–10.3)
CALCIUM SERPL-MCNC: 8 MG/DL (ref 8.6–10.3)
CHLORIDE SERPL-SCNC: 99 MMOL/L (ref 98–107)
CHLORIDE SERPL-SCNC: 99 MMOL/L (ref 98–107)
CO2 SERPL-SCNC: 22 MMOL/L (ref 21–32)
CO2 SERPL-SCNC: 23 MMOL/L (ref 21–32)
COLOR UR: COLORLESS
CREAT SERPL-MCNC: 5.95 MG/DL (ref 0.5–1.3)
CREAT SERPL-MCNC: 6.37 MG/DL (ref 0.5–1.3)
CREAT UR-MCNC: 34.3 MG/DL (ref 20–370)
CREAT UR-MCNC: 34.3 MG/DL (ref 20–370)
EGFRCR SERPLBLD CKD-EPI 2021: 10 ML/MIN/1.73M*2
EGFRCR SERPLBLD CKD-EPI 2021: 11 ML/MIN/1.73M*2
EOSINOPHIL # BLD AUTO: 0.12 X10*3/UL (ref 0–0.7)
EOSINOPHIL NFR BLD AUTO: 0.8 %
ERYTHROCYTE [DISTWIDTH] IN BLOOD BY AUTOMATED COUNT: 13.5 % (ref 11.5–14.5)
ERYTHROCYTE [DISTWIDTH] IN BLOOD BY AUTOMATED COUNT: 13.5 % (ref 11.5–14.5)
GLUCOSE SERPL-MCNC: 121 MG/DL (ref 74–99)
GLUCOSE SERPL-MCNC: 122 MG/DL (ref 74–99)
GLUCOSE UR STRIP.AUTO-MCNC: NORMAL MG/DL
HCT VFR BLD AUTO: 28.7 % (ref 41–52)
HCT VFR BLD AUTO: 28.7 % (ref 41–52)
HGB BLD-MCNC: 9.3 G/DL (ref 13.5–17.5)
HGB BLD-MCNC: 9.3 G/DL (ref 13.5–17.5)
HOLD SPECIMEN: NORMAL
IMM GRANULOCYTES # BLD AUTO: 0.18 X10*3/UL (ref 0–0.7)
IMM GRANULOCYTES NFR BLD AUTO: 1.2 % (ref 0–0.9)
KETONES UR STRIP.AUTO-MCNC: NEGATIVE MG/DL
LEUKOCYTE ESTERASE UR QL STRIP.AUTO: ABNORMAL
LYMPHOCYTES # BLD AUTO: 1.2 X10*3/UL (ref 1.2–4.8)
LYMPHOCYTES NFR BLD AUTO: 8 %
MCH RBC QN AUTO: 25.8 PG (ref 26–34)
MCH RBC QN AUTO: 25.8 PG (ref 26–34)
MCHC RBC AUTO-ENTMCNC: 32.4 G/DL (ref 32–36)
MCHC RBC AUTO-ENTMCNC: 32.4 G/DL (ref 32–36)
MCV RBC AUTO: 80 FL (ref 80–100)
MCV RBC AUTO: 80 FL (ref 80–100)
MONOCYTES # BLD AUTO: 1.39 X10*3/UL (ref 0.1–1)
MONOCYTES NFR BLD AUTO: 9.2 %
NEUTROPHILS # BLD AUTO: 12.17 X10*3/UL (ref 1.2–7.7)
NEUTROPHILS NFR BLD AUTO: 80.6 %
NITRITE UR QL STRIP.AUTO: NEGATIVE
NRBC BLD-RTO: 0 /100 WBCS (ref 0–0)
NRBC BLD-RTO: 0 /100 WBCS (ref 0–0)
PH UR STRIP.AUTO: 5.5 [PH]
PHOSPHATE SERPL-MCNC: 4.2 MG/DL (ref 2.5–4.9)
PLATELET # BLD AUTO: 529 X10*3/UL (ref 150–450)
PLATELET # BLD AUTO: 529 X10*3/UL (ref 150–450)
POTASSIUM SERPL-SCNC: 4.2 MMOL/L (ref 3.5–5.3)
POTASSIUM SERPL-SCNC: 4.5 MMOL/L (ref 3.5–5.3)
PROT UR STRIP.AUTO-MCNC: ABNORMAL MG/DL
PROT UR-ACNC: 81 MG/DL (ref 5–25)
PROT/CREAT UR: 2.36 MG/MG CREAT (ref 0–0.17)
RBC # BLD AUTO: 3.61 X10*6/UL (ref 4.5–5.9)
RBC # BLD AUTO: 3.61 X10*6/UL (ref 4.5–5.9)
RBC # UR STRIP.AUTO: ABNORMAL /UL
RBC #/AREA URNS AUTO: ABNORMAL /HPF
SODIUM SERPL-SCNC: 132 MMOL/L (ref 136–145)
SODIUM SERPL-SCNC: 132 MMOL/L (ref 136–145)
SP GR UR STRIP.AUTO: 1.01
UROBILINOGEN UR STRIP.AUTO-MCNC: NORMAL MG/DL
WBC # BLD AUTO: 15.1 X10*3/UL (ref 4.4–11.3)
WBC # BLD AUTO: 15.1 X10*3/UL (ref 4.4–11.3)
WBC #/AREA URNS AUTO: ABNORMAL /HPF

## 2024-04-29 PROCEDURE — 2500000001 HC RX 250 WO HCPCS SELF ADMINISTERED DRUGS (ALT 637 FOR MEDICARE OP)

## 2024-04-29 PROCEDURE — C9113 INJ PANTOPRAZOLE SODIUM, VIA: HCPCS | Performed by: INTERNAL MEDICINE

## 2024-04-29 PROCEDURE — 36415 COLL VENOUS BLD VENIPUNCTURE: CPT | Performed by: INTERNAL MEDICINE

## 2024-04-29 PROCEDURE — 85025 COMPLETE CBC W/AUTO DIFF WBC: CPT | Performed by: INTERNAL MEDICINE

## 2024-04-29 PROCEDURE — 2500000004 HC RX 250 GENERAL PHARMACY W/ HCPCS (ALT 636 FOR OP/ED): Performed by: INTERNAL MEDICINE

## 2024-04-29 PROCEDURE — 82610 CYSTATIN C: CPT | Performed by: INTERNAL MEDICINE

## 2024-04-29 PROCEDURE — 80048 BASIC METABOLIC PNL TOTAL CA: CPT | Mod: CCI | Performed by: INTERNAL MEDICINE

## 2024-04-29 PROCEDURE — 97116 GAIT TRAINING THERAPY: CPT | Mod: GP,CQ

## 2024-04-29 PROCEDURE — 99233 SBSQ HOSP IP/OBS HIGH 50: CPT | Performed by: INTERNAL MEDICINE

## 2024-04-29 PROCEDURE — 80048 BASIC METABOLIC PNL TOTAL CA: CPT | Performed by: INTERNAL MEDICINE

## 2024-04-29 PROCEDURE — 74018 RADEX ABDOMEN 1 VIEW: CPT | Performed by: RADIOLOGY

## 2024-04-29 PROCEDURE — 1200000002 HC GENERAL ROOM WITH TELEMETRY DAILY

## 2024-04-29 PROCEDURE — 93005 ELECTROCARDIOGRAM TRACING: CPT

## 2024-04-29 PROCEDURE — 99232 SBSQ HOSP IP/OBS MODERATE 35: CPT | Performed by: INTERNAL MEDICINE

## 2024-04-29 PROCEDURE — 80069 RENAL FUNCTION PANEL: CPT | Performed by: INTERNAL MEDICINE

## 2024-04-29 PROCEDURE — 74018 RADEX ABDOMEN 1 VIEW: CPT

## 2024-04-29 PROCEDURE — 2500000001 HC RX 250 WO HCPCS SELF ADMINISTERED DRUGS (ALT 637 FOR MEDICARE OP): Performed by: INTERNAL MEDICINE

## 2024-04-29 PROCEDURE — 97165 OT EVAL LOW COMPLEX 30 MIN: CPT | Mod: GO

## 2024-04-29 PROCEDURE — 2500000004 HC RX 250 GENERAL PHARMACY W/ HCPCS (ALT 636 FOR OP/ED): Performed by: STUDENT IN AN ORGANIZED HEALTH CARE EDUCATION/TRAINING PROGRAM

## 2024-04-29 RX ADMIN — PIPERACILLIN SODIUM AND TAZOBACTAM SODIUM 2.25 G: 2; .25 INJECTION, SOLUTION INTRAVENOUS at 09:32

## 2024-04-29 RX ADMIN — HEPARIN SODIUM 5000 UNITS: 5000 INJECTION INTRAVENOUS; SUBCUTANEOUS at 21:38

## 2024-04-29 RX ADMIN — DOCUSATE SODIUM 100 MG: 100 CAPSULE, LIQUID FILLED ORAL at 21:38

## 2024-04-29 RX ADMIN — HEPARIN SODIUM 5000 UNITS: 5000 INJECTION INTRAVENOUS; SUBCUTANEOUS at 15:40

## 2024-04-29 RX ADMIN — HEPARIN SODIUM 5000 UNITS: 5000 INJECTION INTRAVENOUS; SUBCUTANEOUS at 06:28

## 2024-04-29 RX ADMIN — PIPERACILLIN SODIUM AND TAZOBACTAM SODIUM 2.25 G: 2; .25 INJECTION, SOLUTION INTRAVENOUS at 15:40

## 2024-04-29 RX ADMIN — ACETAMINOPHEN 650 MG: 650 SOLUTION ORAL at 09:31

## 2024-04-29 RX ADMIN — POTASSIUM CHLORIDE, DEXTROSE MONOHYDRATE AND SODIUM CHLORIDE 100 ML/HR: 150; 5; 450 INJECTION, SOLUTION INTRAVENOUS at 21:03

## 2024-04-29 RX ADMIN — PIPERACILLIN SODIUM AND TAZOBACTAM SODIUM 2.25 G: 2; .25 INJECTION, SOLUTION INTRAVENOUS at 03:16

## 2024-04-29 RX ADMIN — PANTOPRAZOLE SODIUM 40 MG: 40 INJECTION, POWDER, FOR SOLUTION INTRAVENOUS at 06:28

## 2024-04-29 RX ADMIN — PIPERACILLIN SODIUM AND TAZOBACTAM SODIUM 2.25 G: 2; .25 INJECTION, SOLUTION INTRAVENOUS at 21:38

## 2024-04-29 ASSESSMENT — COGNITIVE AND FUNCTIONAL STATUS - GENERAL
WALKING IN HOSPITAL ROOM: A LITTLE
MOBILITY SCORE: 21
TOILETING: A LITTLE
WALKING IN HOSPITAL ROOM: A LITTLE
PERSONAL GROOMING: A LITTLE
MOVING TO AND FROM BED TO CHAIR: A LITTLE
STANDING UP FROM CHAIR USING ARMS: A LITTLE
TURNING FROM BACK TO SIDE WHILE IN FLAT BAD: A LITTLE
DRESSING REGULAR LOWER BODY CLOTHING: A LITTLE
MOVING TO AND FROM BED TO CHAIR: A LITTLE
DAILY ACTIVITIY SCORE: 24
CLIMB 3 TO 5 STEPS WITH RAILING: A LITTLE
DAILY ACTIVITIY SCORE: 19
HELP NEEDED FOR BATHING: A LITTLE
DRESSING REGULAR UPPER BODY CLOTHING: A LITTLE
MOVING FROM LYING ON BACK TO SITTING ON SIDE OF FLAT BED WITH BEDRAILS: A LITTLE
MOBILITY SCORE: 18
CLIMB 3 TO 5 STEPS WITH RAILING: A LITTLE

## 2024-04-29 ASSESSMENT — PAIN DESCRIPTION - DESCRIPTORS
DESCRIPTORS: ACHING
DESCRIPTORS: ACHING

## 2024-04-29 ASSESSMENT — PAIN - FUNCTIONAL ASSESSMENT
PAIN_FUNCTIONAL_ASSESSMENT: 0-10

## 2024-04-29 ASSESSMENT — PAIN SCALES - WONG BAKER
WONGBAKER_NUMERICALRESPONSE: NO HURT
WONGBAKER_NUMERICALRESPONSE: NO HURT

## 2024-04-29 ASSESSMENT — PAIN SCALES - GENERAL
PAINLEVEL_OUTOF10: 4
PAINLEVEL_OUTOF10: 6
PAINLEVEL_OUTOF10: 4
PAINLEVEL_OUTOF10: 3
PAINLEVEL_OUTOF10: 4

## 2024-04-29 ASSESSMENT — ACTIVITIES OF DAILY LIVING (ADL)
BATHING_ASSISTANCE: INDEPENDENT
ADL_ASSISTANCE: INDEPENDENT

## 2024-04-29 NOTE — CONSULTS
"  Wound & Ostomy Care Consult     Visit Date: 4/29/2024      Patient Name: James Ramirez         MRN: [id number]           YOB: 1980     [facility] nursing visit outcome: changed colostomy pouching and provided first lesson to patient on pouching.      [facility] next scheduled visit/plan: tomorrow for midline wound NPWT dressing change.      Stoma Type: End Colostomy  Del: No  Diameter: 1 1/2\"  Location: LUQ  Protrusion: Budded  Mucosal Condition and Color: Moist, Red, Edematous  Mucocutaneous Junction: Intact  Peristomal Skin: Clear, intact  Location of Skin Impairment: n/a  Peristomal Contour: Flat  Supportive Tissue: Firm  Character of Output: Blood tinged- scant, unmeasured in pouch  Emptying Frequency: per nursing  Removed/Current Pouching System: post op pouching  Current Wearing Time: 5 Days    Recommendations:   Skin Care: stoma powder as needed for peristomal skin irritation  Pouching System: Kervin red, flat with lock 'n roll pouch, Adapt barrier ring  Wear Time: 3-4 Days or sooner for pouching lesson  Other: n/a     Incision: midline incision with NPWT dressing at midline, upper staples JAVIER  Drain: DUSTY, RLQ, LLQ, and Serosanguineous- site care provided and drain sponge applied. NG tube in place.     Supplier:  not identified at this time.    Comments: Patient did not want his mother present to see the stoma or observed that pouching lesson.  Encouraged frequent ambulation and use of IS.          Elenita Subramanian, RN, BSN, [facility]  Phone: [phone]  4/29/2024  11:06 AM        "

## 2024-04-29 NOTE — PROGRESS NOTES
"James Ramirez is a 44 y.o. male on day 5 of admission presenting with Bowel perforation (Multi).    Assessment/Plan   Status post colon resection and drainage of abdominal wall abscess.  Currently with an ileus and acute renal failure.  Nephrology is following.  We are continuing antibiotics.  Will DC PCA.  Pathology still pending    Subjective   Feels about the same.  He has been making some urine.       Objective     Physical Exam  NAD  A&Ox3  Non icteric  CTA  RR  Abdomen soft min tender. Wounds clean, intact colostomy healthy appearing but no stool..  Extremities warm, well perfused     Last Recorded Vitals  Blood pressure 134/84, pulse 77, temperature 36.6 °C (97.8 °F), temperature source Oral, resp. rate 17, height 1.83 m (6' 0.05\"), weight 73 kg (161 lb), SpO2 97%.  Intake/Output last 3 Shifts:  I/O last 3 completed shifts:  In: 921.8 (12.6 mL/kg) [I.V.:0.8 (0 mL/kg); IV Piggyback:921]  Out: 2089 (28.6 mL/kg) [Urine:1000 (0.4 mL/kg/hr); Emesis/NG output:950; Drains:139]  Weight: 73 kg     Relevant Results    Scheduled medications  acetaminophen, 650 mg, nasogastric tube, q6h   Or  acetaminophen, 650 mg, nasogastric tube, q6h   Or  acetaminophen, 650 mg, rectal, q6h  docusate sodium, 100 mg, oral, BID  heparin (porcine), 5,000 Units, subcutaneous, q8h LILY  pantoprazole, 40 mg, intravenous, Daily before breakfast  phenylephrine, 1 spray, Each Nostril, Once  piperacillin-tazobactam, 2.25 g, intravenous, q6h  polyethylene glycol, 17 g, oral, Daily      Continuous medications  potassium ocyivtc-I8-6.45%NaCl, 100 mL/hr, Last Rate: 100 mL/hr (04/28/24 1630)  HYDROmorphone,       PRN medications  PRN medications: benzocaine-menthol, [Held by provider] morphine, naloxone, ondansetron **OR** ondansetron    Results for orders placed or performed during the hospital encounter of 04/24/24 (from the past 24 hour(s))   Urine electrolytes   Result Value Ref Range    Sodium, Urine Random 42 mmol/L    Sodium/Creatinine Ratio 123 " Not established. mmol/g Creat    Potassium, Urine Random 8 mmol/L    Potassium/Creatinine Ratio 23 Not established mmol/g Creat    Chloride, Urine Random 35 mmol/L    Chloride/Creatinine Ratio 102 23 - 275 mmol/g creat    Creatinine, Urine Random 34.2 20.0 - 370.0 mg/dL           I spent 25 minutes in the professional and overall care of this patient.      Dionisio Salinas MD

## 2024-04-29 NOTE — PROGRESS NOTES
Physical Therapy    Physical Therapy Treatment    Patient Name: James Ramirez  MRN: [id number]  Today's Date: 4/29/2024  Time Calculation  Start Time: 1424  Stop Time: 1449  Time Calculation (min): 25 min       Assessment/Plan   PT Assessment  End of Session Communication: Bedside nurse  End of Session Patient Position: Bed, 3 rail up, Alarm on  PT Plan  Inpatient/Swing Bed or Outpatient: Inpatient  PT Plan  Treatment/Interventions: Bed mobility, Transfer training, Gait training  PT Plan: Skilled PT  PT Frequency: 3 times per week  PT Discharge Recommendations: Low intensity level of continued care  PT Recommended Transfer Status: Assist x1  PT - OK to Discharge: Yes (per POC)      General Visit Information:   PT  Visit  PT Received On: 04/29/24  General  Reason for Referral: Pt admitted with abdominal pain, found to have an abscess and bowel perforation, now s/p ex lap.  Prior to Session Communication: Bedside nurse  Patient Position Received: Bed, 3 rail up, Alarm off, not on at start of session  General Comment: Pt presents with flat affect, mod encouragement to participate in treatment session    Subjective   Precautions:  Precautions  Medical Precautions: Abdominal precautions, Fall precautions  Post-Surgical Precautions: Abdominal surgery precautions  Precautions Comment: Pt unable to state abdominal precautions, education provided and log roll technique taught (wound vac, DUSTY drain x 2, IV, telemetry, NG to suction)    Objective   Pain:  Pain Assessment  Pain Assessment: 0-10  Pain Score: 4  Pain Type: Surgical pain  Pain Location: Abdomen    Treatments:        Bed Mobility  Bed Mobility: Yes  Bed Mobility 1  Bed Mobility 1: Supine to sitting, Sitting to supine  Level of Assistance 1: Close supervision  Bed Mobility Comments 1: HOB slightly elevated. Pt educated on log roll technique.    Ambulation/Gait Training  Ambulation/Gait Training Performed: Yes  Ambulation/Gait Training 1  Surface 1: Level tile  Device 1:  No device  Assistance 1: Close supervision  Quality of Gait 1: Forward flexed posture  Comments/Distance (ft) 1: 475 (No LOB noted. Pt demonstrates forward flexed posture, mod VC to correct. Min SOB noted)  Transfers  Transfer: Yes  Transfer 1  Technique 1: Sit to stand, Stand to sit  Transfer Level of Assistance 1: Distant supervision  Trials/Comments 1: Line management required, pt aware of lines and demonstrates good safety awareness    Outcome Measures:  [facility] Basic Mobility  Turning from your back to your side while in a flat bed without using bedrails: A little  Moving from lying on your back to sitting on the side of a flat bed without using bedrails: A little  Moving to and from bed to chair (including a wheelchair): A little  Standing up from a chair using your arms (e.g. wheelchair or bedside chair): A little  To walk in hospital room: A little  Climbing 3-5 steps with railing: A little  Basic Mobility - Total Score: 18    Education Documentation  Precautions, taught by Rena Mena PTA at 4/29/2024  3:54 PM.  Learner: Patient  Readiness: Acceptance  Method: Explanation  Response: Verbalizes Understanding  Comment: Education provided for abdominal precautions    Body Mechanics, taught by Rena Mena PTA at 4/29/2024  3:54 PM.  Learner: Patient  Readiness: Acceptance  Method: Explanation  Response: Verbalizes Understanding  Comment: Education provided for abdominal precautions    Mobility Training, taught by Rena Mena PTA at 4/29/2024  3:54 PM.  Learner: Patient  Readiness: Acceptance  Method: Explanation  Response: Verbalizes Understanding  Comment: Education provided for abdominal precautions    Education Comments  No comments found.        OP EDUCATION:       Encounter Problems       Encounter Problems (Active)       Balance       STG - Maintains dynamic standing balance without upper extremity support x 10 minutes with indep (Progressing)       Start:  04/25/24    Expected End:  05/09/24        INTERVENTIONS:  1. Practice standing with minimal support.  2. Educate patient about standing tolerance.  3. Educate patient about independence with gait, transfers, and ADL's.  4. Educate patient about use of assistive device.  5. Educate patient about self-directed care.            Mobility       STG - Patient will ambulate >250' indep (Progressing)       Start:  04/25/24    Expected End:  05/09/24            STG - Patient will ascend and descend 3 steps without HR indep (Progressing)       Start:  04/25/24    Expected End:  05/09/24               PT Transfers       STG - Transfer from bed to chair with indep (Progressing)       Start:  04/25/24    Expected End:  05/09/24            STG - Patient to transfer to and from sit to supine via logroll mod indep (Progressing)       Start:  04/25/24    Expected End:  05/09/24            STG - Patient will transfer sit to and from stand indep (Progressing)       Start:  04/25/24    Expected End:  05/09/24               Pain - Adult

## 2024-04-29 NOTE — PROGRESS NOTES
04/29/24 1422   Discharge Planning   Patient expects to be discharged to: Home with HHC if he is agreeable?     Met with patient about dc planning, he states he does not need HHC, he did agree that this TCC can set up HHC and he will decided when they call to set up an appt if needed.     LATE ENTRY- met with patient regarding dc planning and FMLA paperwork, patient does not have a pcp, will give him the number to call and set up an appt with a pcp in his desired location, will need to ask Dr Flynn to follow for healthy at home and [facility] until pcp is established, have a call out to Dr Salinas office to see if he will follow for HHC AND FMLA.

## 2024-04-29 NOTE — PROGRESS NOTES
Nephrology Consult Progress Note    Admit Date: 4/24/2024    Interval history:  No SOB, not much abdominal pain  Not having any drain from ostomy     CURRENT MEDICATIONS:    Current Facility-Administered Medications:     acetaminophen (Tylenol) tablet 650 mg, 650 mg, nasogastric tube, q6h, 650 mg at 04/28/24 0926 **OR** acetaminophen (Tylenol) oral liquid 650 mg, 650 mg, nasogastric tube, q6h, 650 mg at 04/29/24 0931 **OR** acetaminophen (Tylenol) suppository 650 mg, 650 mg, rectal, q6h, Blayne Villar PA-C    benzocaine-menthol (Cepastat Sore Throat) lozenge 1 lozenge, 1 lozenge, Mouth/Throat, q2h PRN, Zahira Miller MD, 1 lozenge at 04/27/24 2151    dextrose 5 % and sodium chloride 0.45 % with KCl 20 mEq/L infusion, 100 mL/hr, intravenous, Continuous, Zahira Miller MD, Last Rate: 100 mL/hr at 04/28/24 1630, 100 mL/hr at 04/28/24 1630    docusate sodium (Colace) capsule 100 mg, 100 mg, oral, BID, Zahira Miller MD, 100 mg at 04/28/24 0925    heparin (porcine) injection 5,000 Units, 5,000 Units, subcutaneous, q8h LILY, Zahira Miller MD, 5,000 Units at 04/29/24 0628    [Held by provider] morphine injection 2 mg, 2 mg, intravenous, q4h PRN, Zahira Miller MD    naloxone (Narcan) injection 0.2 mg, 0.2 mg, intravenous, PRN, Dionisio Salinas MD    ondansetron (Zofran) tablet 4 mg, 4 mg, oral, q6h PRN **OR** ondansetron (Zofran) injection 4 mg, 4 mg, intravenous, q6h PRN, Blayne Villar PA-C, 4 mg at 04/27/24 2105    pantoprazole (ProtoNix) injection 40 mg, 40 mg, intravenous, Daily before breakfast, Zahira Miller MD, 40 mg at 04/29/24 0628    phenylephrine (Nathen-Synephrine) 0.25 % nasal spray 1 spray, 1 spray, Each Nostril, Once, Blayne Villar PA-C    piperacillin-tazobactam-dextrose (Zosyn) IV 2.25 g, 2.25 g, intravenous, q6h, Alyssa Jurado DO, Stopped at 04/29/24 1002    polyethylene glycol (Glycolax, Miralax) packet 17 g, 17 g, oral, Daily, Zahira Miller MD, 17 g at 04/28/24 0926       Intake/Output Summary (Last 24  "hours) at 4/29/2024 1234  Last data filed at 4/29/2024 0605  Gross per 24 hour   Intake 321 ml   Output 1061 ml   Net -740 ml       PHYSICAL EXAM:  /89 (BP Location: Left arm, Patient Position: Sitting)   Pulse 65   Temp 36.4 °C (97.5 °F) (Oral)   Resp 17   Ht 1.83 m (6' 0.05\")   Wt 73 kg (161 lb)   SpO2 100%   BMI 21.81 kg/m²     Intake/Output Summary (Last 24 hours) at 4/29/2024 1234  Last data filed at 4/29/2024 0605  Gross per 24 hour   Intake 321 ml   Output 1061 ml   Net -740 ml     Gen: AAO, NAD  Neck: No JVD  Cardiac: RRR  Resp: clear BS  Abd: Soft, mildly tender, +BS, non distended   +ostomy  Ext: No edema   Neuro: moves 4 ext  Peripheral Pulses: Capillary refill <2secs, strong peripheral pulses.  Skin: Skin color, texture, turgor normal, no suspicious rashes or lesions.    Labs:  Results for orders placed or performed during the hospital encounter of 04/24/24 (from the past 24 hour(s))   Urine electrolytes   Result Value Ref Range    Sodium, Urine Random 42 mmol/L    Sodium/Creatinine Ratio 123 Not established. mmol/g Creat    Potassium, Urine Random 8 mmol/L    Potassium/Creatinine Ratio 23 Not established mmol/g Creat    Chloride, Urine Random 35 mmol/L    Chloride/Creatinine Ratio 102 23 - 275 mmol/g creat    Creatinine, Urine Random 34.2 20.0 - 370.0 mg/dL   Urinalysis with Reflex Microscopic   Result Value Ref Range    Color, Urine Colorless (N) Light-Yellow, Yellow, Dark-Yellow    Appearance, Urine Turbid (N) Clear    Specific Gravity, Urine 1.006 1.005 - 1.035    pH, Urine 5.5 5.0, 5.5, 6.0, 6.5, 7.0, 7.5, 8.0    Protein, Urine 50 (1+) (A) NEGATIVE, 10 (TRACE), 20 (TRACE) mg/dL    Glucose, Urine Normal Normal mg/dL    Blood, Urine 0.06 (1+) (A) NEGATIVE    Ketones, Urine NEGATIVE NEGATIVE mg/dL    Bilirubin, Urine NEGATIVE NEGATIVE    Urobilinogen, Urine Normal Normal mg/dL    Nitrite, Urine NEGATIVE NEGATIVE    Leukocyte Esterase, Urine 25 Margarita/µL (A) NEGATIVE   Protein, Urine Random "   Result Value Ref Range    Total Protein, Urine Random 81 (H) 5 - 25 mg/dL    Creatinine, Urine Random 34.3 20.0 - 370.0 mg/dL    T. Protein/Creatinine Ratio 2.36 (H) 0.00 - 0.17 mg/mg Creat   Creatinine, Urine Random   Result Value Ref Range    Creatinine, Urine Random 34.3 20.0 - 370.0 mg/dL   Microscopic Only, Urine   Result Value Ref Range    WBC, Urine 6-10 (A) 1-5, NONE /HPF    RBC, Urine 3-5 NONE, 1-2, 3-5 /HPF    Bacteria, Urine 1+ (A) NONE SEEN /HPF   Basic Metabolic Panel   Result Value Ref Range    Glucose 121 (H) 74 - 99 mg/dL    Sodium 132 (L) 136 - 145 mmol/L    Potassium 4.5 3.5 - 5.3 mmol/L    Chloride 99 98 - 107 mmol/L    Bicarbonate 22 21 - 32 mmol/L    Anion Gap 16 10 - 20 mmol/L    Urea Nitrogen 33 (H) 6 - 23 mg/dL    Creatinine 5.95 (H) 0.50 - 1.30 mg/dL    eGFR 11 (L) >60 mL/min/1.73m*2    Calcium 8.0 (L) 8.6 - 10.3 mg/dL   CBC   Result Value Ref Range    WBC 15.1 (H) 4.4 - 11.3 x10*3/uL    nRBC 0.0 0.0 - 0.0 /100 WBCs    RBC 3.61 (L) 4.50 - 5.90 x10*6/uL    Hemoglobin 9.3 (L) 13.5 - 17.5 g/dL    Hematocrit 28.7 (L) 41.0 - 52.0 %    MCV 80 80 - 100 fL    MCH 25.8 (L) 26.0 - 34.0 pg    MCHC 32.4 32.0 - 36.0 g/dL    RDW 13.5 11.5 - 14.5 %    Platelets 529 (H) 150 - 450 x10*3/uL   CBC and Auto Differential   Result Value Ref Range    WBC 15.1 (H) 4.4 - 11.3 x10*3/uL    nRBC 0.0 0.0 - 0.0 /100 WBCs    RBC 3.61 (L) 4.50 - 5.90 x10*6/uL    Hemoglobin 9.3 (L) 13.5 - 17.5 g/dL    Hematocrit 28.7 (L) 41.0 - 52.0 %    MCV 80 80 - 100 fL    MCH 25.8 (L) 26.0 - 34.0 pg    MCHC 32.4 32.0 - 36.0 g/dL    RDW 13.5 11.5 - 14.5 %    Platelets 529 (H) 150 - 450 x10*3/uL    Neutrophils % 80.6 40.0 - 80.0 %    Immature Granulocytes %, Automated 1.2 (H) 0.0 - 0.9 %    Lymphocytes % 8.0 13.0 - 44.0 %    Monocytes % 9.2 2.0 - 10.0 %    Eosinophils % 0.8 0.0 - 6.0 %    Basophils % 0.2 0.0 - 2.0 %    Neutrophils Absolute 12.17 (H) 1.20 - 7.70 x10*3/uL    Immature Granulocytes Absolute, Automated 0.18 0.00 - 0.70  x10*3/uL    Lymphocytes Absolute 1.20 1.20 - 4.80 x10*3/uL    Monocytes Absolute 1.39 (H) 0.10 - 1.00 x10*3/uL    Eosinophils Absolute 0.12 0.00 - 0.70 x10*3/uL    Basophils Absolute 0.03 0.00 - 0.10 x10*3/uL        DATA:   Diagnostic tests reviewed for today's visit:    New labs and imaging     Assessment and Plan:  Pt came with diverticulitis, found to have bowel perforation, emergent surgery on 4/24, s/o partial colon resection with diverting end colostomy   - HARLEY: baseline Scr 0.6  Due to ATN and vanco toxicity. No eosinophilia and without signs of AIN  Non oliguric, normal kidney US yesterday   UA without RBC, PCR 2.3 -likely tubular proteinuria mostly-  BP: controlled  Lytes and acid base: acceptable, hypotonic ivf with KCL per surgery      PLAN:  - keep ivf, adjust based on labs   - Supportive care, off vanco, dose with levels    Will continue to follow.     Signature: Felix Garcia MD

## 2024-04-29 NOTE — PROGRESS NOTES
Occupational Therapy    Evaluation    Patient Name: James Ramirez  MRN: [id number]  Today's Date: 4/29/2024  Time Calculation  Start Time: 1150  Stop Time: 1205  Time Calculation (min): 15 min    Assessment  IP OT Assessment  OT Assessment:  (Patient is doing well overall in transfers, no assistive device required. Patient able to complete ADLs independently.)  Prognosis: Good  Barriers to Discharge: Inaccessible home environment  Medical Staff Made Aware: Yes  End of Session Communication: Bedside nurse  End of Session Patient Position: Up in chair, Alarm on  Plan:  No Skilled OT: Independent with ADLs  OT Frequency: OT eval only  OT Discharge Recommendations: No further acute OT  Equipment Recommended upon Discharge:  (None)  OT Recommended Transfer Status: Independent  OT - OK to Discharge: Yes    Subjective   Current Problem:  1. Bowel perforation (Multi)  Case Request Operating Room: Exploration Laparoscopy    Case Request Operating Room: Exploration Laparoscopy    Tissue/Wound Culture/Smear    Tissue/Wound Culture/Smear    Surgical Pathology Exam    Surgical Pathology Exam      2. Intra-abdominal abscess (Multi)          General:  General  Reason for Referral:  (Bowel Perforation, s/p Exp Laparotmy, L Colon Resection Drainage, abdominal abscess.)  Past Medical History Relevant to Rehab: Diverticulitis, Acute Kidney Injury.     Missed Visit: Yes  Missed Visit Reason:  (Referral received, chart reviewed and eval attempted, however patient refused. Patient refused to sit on EOB for eval x 3 times, unable to complete eval.)  Family/Caregiver Present: Yes  Caregiver Feedback:  (Mother is present.)  Prior to Session Communication: Bedside nurse  Patient Position Received: Up in chair, Alarm on  General Comment:  (Patient is doing well overall with transfers and ADLs.)  Precautions:  Medical Precautions: Abdominal precautions, Fall precautions  Post-Surgical Precautions: Abdominal surgery precautions  Precautions  Comment:  (Wound Vac, DUSTY drain)    Pain:  Pain Assessment  Pain Assessment: 0-10  Pain Score: 4  Pain Type: Acute pain, Surgical pain  Pain Location: Abdomen    Objective   Cognition:  Overall Cognitive Status: Within Functional Limits  Insight: Within function limits  Impulsive: Within functional limits     Home Living:  Type of Home:  (2 story home with 3 steps, no steps, no HR, Bed/Bath 1st floor, tub/shower combo, std ht toilet.)  Lives With:  (Dad)  Home Adaptive Equipment: None  Home Layout: One level  Home Access: Stairs to enter with rails  Entrance Stairs-Rails: None  Entrance Stairs-Number of Steps:  (3 steps)   Prior Function:  Level of [place]: Independent with ADLs and functional transfers  ADL Assistance: Independent  Homemaking Assistance: Independent  Ambulatory Assistance: Independent  Hand Dominance: Right  IADL History:  Homemaking Responsibilities: Yes  Meal Prep Responsibility: Primary  ADL:  Eating Assistance: Independent  Grooming Assistance: Independent  Bathing Assistance: Independent  UE Dressing Assistance: Independent  LE Dressing Assistance: Independent  Toileting Assistance with Device: Independent  Functional Assistance: Independent  Activity Tolerance:  Endurance: Endurance does not limit participation in activity  Activity Tolerance Comments:  (Tolerates session well.)    Transfers  Transfer: Yes  Transfer 1  Transfer From 1: Sit to  Transfer to 1: Stand  Technique 1: Sit to stand  Transfer Device 1:  (No assistive device)  Transfer Level of Assistance 1: Independent    Sitting Balance:  Static Sitting Balance  Static Sitting-Comment/Number of Minutes:  (Good balance)  Dynamic Sitting Balance  Dynamic Sitting-Comments:  (Good Balance)  Standing Balance:  Static Standing Balance  Static Standing-Comment/Number of Minutes:  (Good Balance)  Dynamic Standing Balance  Dynamic Standing-Comments:  (Good Balance)    IADL's:   Homemaking Responsibilities: Yes  Meal Prep Responsibility:  Primary  Vision:    Sensation:  Light Touch: No apparent deficits  Strength:  Strength Comments:  (B UEs- 4/5)  Perception:  Inattention/Neglect: Appears intact  Initiation: Appears intact  Coordination:  Movements are Fluid and Coordinated: Yes     Outcome Measures: [facility] Daily Activity  Putting on and taking off regular lower body clothing: None  Bathing (including washing, rinsing, drying): None  Putting on and taking off regular upper body clothing: None  Toileting, which includes using toilet, bedpan or urinal: None  Taking care of personal grooming such as brushing teeth: None  Eating Meals: None  Daily Activity - Total Score: 24      Education Documentation  ADL Training, taught by Salazar Arellano OT at 4/29/2024  1:32 PM.  Learner: Patient  Readiness: Acceptance  Method: Explanation  Response: Verbalizes Understanding    OT Eval complete, patient is independent with ADLs. No skilled OT needs after discharge. Will discontinue OT order.

## 2024-04-29 NOTE — PROGRESS NOTES
"  INFECTIOUS DISEASE DAILY PROGRESS NOTE    SUBJECTIVE:    No overnight events. No new complaints. Afebrile. No rash/itching/diarrhea.    OBJECTIVE:  VITALS (Last 24 Hours)  /84 (BP Location: Right arm, Patient Position: Lying)   Pulse 77   Temp 36.6 °C (97.8 °F) (Oral)   Resp 17   Ht 1.83 m (6' 0.05\")   Wt 73 kg (161 lb)   SpO2 97%   BMI 21.81 kg/m²     PHYSICAL EXAM:  Gen - NAD  ENT - NG tube with bilious output  Abd - stoma looks fine, incision with staples  Skin - no rashes    ABX: IV Zosyn    LABS:  Lab Results   Component Value Date    WBC 17.0 (H) 04/28/2024    HGB 8.4 (L) 04/28/2024    HCT 26.5 (L) 04/28/2024    MCV 79 (L) 04/28/2024     (H) 04/28/2024     Lab Results   Component Value Date    GLUCOSE 114 (H) 04/28/2024    CALCIUM 7.7 (L) 04/28/2024     (L) 04/28/2024    K 4.1 04/28/2024    CO2 23 04/28/2024    CL 98 04/28/2024    BUN 22 04/28/2024    CREATININE 4.27 (H) 04/28/2024     Results from last 72 hours   Lab Units 04/28/24  0602   ALK PHOS U/L 56   BILIRUBIN TOTAL mg/dL 0.5   PROTEIN TOTAL g/dL 5.4*   ALT U/L 6*   AST U/L 17   ALBUMIN g/dL 2.3*     Estimated Creatinine Clearance: 23.3 mL/min (A) (by C-G formula based on SCr of 4.27 mg/dL (H)).    ASSESSMENT/PLAN:    Complicated Diverticulitis with Perforation - s/p OR partial colectomy on 4/24, washout  Intra-Abd Abscess due to mixed gram positive/gram negative bacteria  CKD - CrCl 23, affects abx dosing    IV Zosyn still. Monitoring for adverse effects of abx such as rash/itching/diarrhea.    Will follow. Thanks!    Nael Arrington MD  [facility]  Office #[phone]      "

## 2024-04-29 NOTE — PROGRESS NOTES
04/29/24 1129   Discharge Planning   Patient expects to be discharged to: Home with [facility]

## 2024-04-30 ENCOUNTER — APPOINTMENT (OUTPATIENT)
Dept: RADIOLOGY | Facility: HOSPITAL | Age: 44
DRG: 329 | End: 2024-04-30
Payer: COMMERCIAL

## 2024-04-30 LAB
ANION GAP SERPL CALC-SCNC: 17 MMOL/L (ref 10–20)
BUN SERPL-MCNC: 38 MG/DL (ref 6–23)
C3 SERPL-MCNC: 115 MG/DL (ref 87–200)
C4 SERPL-MCNC: 33 MG/DL (ref 10–50)
CALCIUM SERPL-MCNC: 7.5 MG/DL (ref 8.6–10.3)
CHLORIDE SERPL-SCNC: 99 MMOL/L (ref 98–107)
CO2 SERPL-SCNC: 22 MMOL/L (ref 21–32)
CREAT SERPL-MCNC: 6.93 MG/DL (ref 0.5–1.3)
EGFRCR SERPLBLD CKD-EPI 2021: 9 ML/MIN/1.73M*2
ERYTHROCYTE [DISTWIDTH] IN BLOOD BY AUTOMATED COUNT: 13.4 % (ref 11.5–14.5)
GLUCOSE SERPL-MCNC: 121 MG/DL (ref 74–99)
HCT VFR BLD AUTO: 28.3 % (ref 41–52)
HGB BLD-MCNC: 9.1 G/DL (ref 13.5–17.5)
MCH RBC QN AUTO: 25.1 PG (ref 26–34)
MCHC RBC AUTO-ENTMCNC: 32.2 G/DL (ref 32–36)
MCV RBC AUTO: 78 FL (ref 80–100)
NRBC BLD-RTO: 0 /100 WBCS (ref 0–0)
PLATELET # BLD AUTO: 547 X10*3/UL (ref 150–450)
POTASSIUM SERPL-SCNC: 4.5 MMOL/L (ref 3.5–5.3)
RBC # BLD AUTO: 3.62 X10*6/UL (ref 4.5–5.9)
SODIUM SERPL-SCNC: 133 MMOL/L (ref 136–145)
WBC # BLD AUTO: 14.8 X10*3/UL (ref 4.4–11.3)

## 2024-04-30 PROCEDURE — 85027 COMPLETE CBC AUTOMATED: CPT | Performed by: INTERNAL MEDICINE

## 2024-04-30 PROCEDURE — 80048 BASIC METABOLIC PNL TOTAL CA: CPT | Performed by: INTERNAL MEDICINE

## 2024-04-30 PROCEDURE — C9113 INJ PANTOPRAZOLE SODIUM, VIA: HCPCS | Performed by: INTERNAL MEDICINE

## 2024-04-30 PROCEDURE — 2500000004 HC RX 250 GENERAL PHARMACY W/ HCPCS (ALT 636 FOR OP/ED): Performed by: INTERNAL MEDICINE

## 2024-04-30 PROCEDURE — 2550000001 HC RX 255 CONTRASTS: Performed by: INTERNAL MEDICINE

## 2024-04-30 PROCEDURE — 2500000001 HC RX 250 WO HCPCS SELF ADMINISTERED DRUGS (ALT 637 FOR MEDICARE OP): Performed by: INTERNAL MEDICINE

## 2024-04-30 PROCEDURE — 99232 SBSQ HOSP IP/OBS MODERATE 35: CPT | Performed by: INTERNAL MEDICINE

## 2024-04-30 PROCEDURE — 99233 SBSQ HOSP IP/OBS HIGH 50: CPT | Performed by: INTERNAL MEDICINE

## 2024-04-30 PROCEDURE — 97116 GAIT TRAINING THERAPY: CPT | Mod: GP,CQ

## 2024-04-30 PROCEDURE — 1200000002 HC GENERAL ROOM WITH TELEMETRY DAILY

## 2024-04-30 PROCEDURE — 2500000004 HC RX 250 GENERAL PHARMACY W/ HCPCS (ALT 636 FOR OP/ED): Performed by: STUDENT IN AN ORGANIZED HEALTH CARE EDUCATION/TRAINING PROGRAM

## 2024-04-30 PROCEDURE — 36415 COLL VENOUS BLD VENIPUNCTURE: CPT | Performed by: INTERNAL MEDICINE

## 2024-04-30 PROCEDURE — 97110 THERAPEUTIC EXERCISES: CPT | Mod: GP,CQ

## 2024-04-30 PROCEDURE — 74018 RADEX ABDOMEN 1 VIEW: CPT

## 2024-04-30 PROCEDURE — 74018 RADEX ABDOMEN 1 VIEW: CPT | Performed by: RADIOLOGY

## 2024-04-30 RX ORDER — MORPHINE SULFATE 2 MG/ML
2 INJECTION, SOLUTION INTRAMUSCULAR; INTRAVENOUS ONCE
Status: COMPLETED | OUTPATIENT
Start: 2024-05-01 | End: 2024-05-01

## 2024-04-30 RX ADMIN — AMPICILLIN SODIUM AND SULBACTAM SODIUM 3 G: 2; 1 INJECTION, POWDER, FOR SOLUTION INTRAMUSCULAR; INTRAVENOUS at 09:15

## 2024-04-30 RX ADMIN — POTASSIUM CHLORIDE, DEXTROSE MONOHYDRATE AND SODIUM CHLORIDE 100 ML/HR: 150; 5; 450 INJECTION, SOLUTION INTRAVENOUS at 06:28

## 2024-04-30 RX ADMIN — PIPERACILLIN SODIUM AND TAZOBACTAM SODIUM 2.25 G: 2; .25 INJECTION, SOLUTION INTRAVENOUS at 03:14

## 2024-04-30 RX ADMIN — PANTOPRAZOLE SODIUM 40 MG: 40 INJECTION, POWDER, FOR SOLUTION INTRAVENOUS at 06:27

## 2024-04-30 RX ADMIN — HEPARIN SODIUM 5000 UNITS: 5000 INJECTION INTRAVENOUS; SUBCUTANEOUS at 14:31

## 2024-04-30 RX ADMIN — POTASSIUM CHLORIDE, DEXTROSE MONOHYDRATE AND SODIUM CHLORIDE 100 ML/HR: 150; 5; 450 INJECTION, SOLUTION INTRAVENOUS at 17:55

## 2024-04-30 RX ADMIN — DOCUSATE SODIUM 100 MG: 100 CAPSULE, LIQUID FILLED ORAL at 20:46

## 2024-04-30 RX ADMIN — HEPARIN SODIUM 5000 UNITS: 5000 INJECTION INTRAVENOUS; SUBCUTANEOUS at 22:04

## 2024-04-30 RX ADMIN — DIATRIZOATE MEGLUMINE AND DIATRIZOATE SODIUM 90 ML: 660; 100 LIQUID ORAL; RECTAL at 10:45

## 2024-04-30 RX ADMIN — DOCUSATE SODIUM 100 MG: 100 CAPSULE, LIQUID FILLED ORAL at 09:15

## 2024-04-30 RX ADMIN — HEPARIN SODIUM 5000 UNITS: 5000 INJECTION INTRAVENOUS; SUBCUTANEOUS at 06:27

## 2024-04-30 ASSESSMENT — PAIN - FUNCTIONAL ASSESSMENT
PAIN_FUNCTIONAL_ASSESSMENT: 0-10
PAIN_FUNCTIONAL_ASSESSMENT: 0-10

## 2024-04-30 ASSESSMENT — COGNITIVE AND FUNCTIONAL STATUS - GENERAL
DAILY ACTIVITIY SCORE: 24
MOBILITY SCORE: 19
STANDING UP FROM CHAIR USING ARMS: A LITTLE
TURNING FROM BACK TO SIDE WHILE IN FLAT BAD: A LITTLE
MOVING TO AND FROM BED TO CHAIR: A LITTLE
MOBILITY SCORE: 24
STANDING UP FROM CHAIR USING ARMS: A LITTLE
WALKING IN HOSPITAL ROOM: A LITTLE
MOVING TO AND FROM BED TO CHAIR: A LITTLE
CLIMB 3 TO 5 STEPS WITH RAILING: A LITTLE
WALKING IN HOSPITAL ROOM: A LITTLE
MOBILITY SCORE: 19
DAILY ACTIVITIY SCORE: 24
CLIMB 3 TO 5 STEPS WITH RAILING: A LITTLE
MOBILITY SCORE: 24
DAILY ACTIVITIY SCORE: 24
TURNING FROM BACK TO SIDE WHILE IN FLAT BAD: A LITTLE

## 2024-04-30 ASSESSMENT — PAIN SCALES - GENERAL
PAINLEVEL_OUTOF10: 0 - NO PAIN

## 2024-04-30 NOTE — PROGRESS NOTES
Physical Therapy    Physical Therapy Treatment    Patient Name: James Ramirez  MRN: [id number]  Today's Date: 4/30/2024  Time Calculation  Start Time: 1401  Stop Time: 1424  Time Calculation (min): 23 min       Assessment/Plan   PT Assessment  End of Session Communication: Bedside nurse  End of Session Patient Position: Bed, 3 rail up, Alarm on  PT Plan  Inpatient/Swing Bed or Outpatient: Inpatient  PT Plan  Treatment/Interventions: Bed mobility, Transfer training, Gait training  PT Plan: Skilled PT  PT Frequency: 3 times per week  PT Discharge Recommendations: Low intensity level of continued care  PT Recommended Transfer Status: Assist x1  PT - OK to Discharge: Yes (per POC)      General Visit Information:   PT  Visit  PT Received On: 04/30/24  General  Reason for Referral: Pt admitted with abdominal pain, found to have an abscess and bowel perforation, now s/p ex lap.  Past Medical History Relevant to Rehab: History reviewed. No pertinent past medical history.    Prior to Session Communication: Bedside nurse  Patient Position Received: Bed, 3 rail up, Alarm off, not on at start of session    Subjective   Precautions:  Precautions  Medical Precautions: Abdominal precautions, Fall precautions  Post-Surgical Precautions: Abdominal surgery precautions  Precautions Comment: Pt able to state abdominal precautions  Vital Signs:       Objective   Pain:  Pain Assessment  Pain Assessment: 0-10  Pain Score: 0 - No pain  Cognition:     Postural Control:  Static Sitting Balance  Static Sitting-Balance Support: Feet supported, Bilateral upper extremity supported  Static Sitting-Level of Assistance: Independent  Static Sitting-Comment/Number of Minutes: Pt able to maintain erect posture  Static Standing Balance  Static Standing-Balance Support: No upper extremity supported  Static Standing-Level of Assistance: Distant supervision  Static Standing-Comment/Number of Minutes: Line management required, no LOB noted. Mod VC for erect  posture  Dynamic Standing Balance  Dynamic Standing-Balance Support: No upper extremity supported  Dynamic Standing-Balance: Lateral lean, Forward lean, Reaching for objects, Reaching across midline  Dynamic Standing-Comments: Close supervision to perform reaching across midline. No LOB noted, able to recover to midline  Extremity/Trunk Assessments:    Activity Tolerance:     Treatments:  Therapeutic Exercise  Therapeutic Exercise Performed: Yes  Therapeutic Exercise Activity 1: Pt performs standing x15 reps alternating marches and standing SLR. Close supervision, no LOB noted although unsteady    Therapeutic Activity  Therapeutic Activity Performed: Yes  Therapeutic Activity 1: Standing dynamic balance performed    Bed Mobility  Bed Mobility: Yes  Bed Mobility 1  Bed Mobility 1: Supine to sitting, Sitting to supine, Log roll  Level of Assistance 1: Distant supervision  Bed Mobility Comments 1: Mod VC for correct log roll technique. Pt able to perform correctly with encouragement and education.    Ambulation/Gait Training  Ambulation/Gait Training Performed: Yes  Ambulation/Gait Training 1  Surface 1: Level tile  Device 1: No device  Assistance 1: Close supervision  Quality of Gait 1: Forward flexed posture  Comments/Distance (ft) 1: 475 (Close supervision required for line management. No LOB noted. Min SOB and fatigue noted. Mod VC for upright posture)  Transfers  Transfer: Yes  Transfer 1  Technique 1: Sit to stand, Stand to sit  Transfer Level of Assistance 1: Distant supervision  Trials/Comments 1: No LOB noted. Pt aware of lines upon transferring, good safety awareness noted. Distant supervision 2/2 line management    Stairs  Stairs: No (unable to 2/2 safety)    Outcome Measures:  [facility] Basic Mobility  Turning from your back to your side while in a flat bed without using bedrails: None  Moving from lying on your back to sitting on the side of a flat bed without using bedrails: A little  Moving to and from bed  to chair (including a wheelchair): A little  Standing up from a chair using your arms (e.g. wheelchair or bedside chair): A little  To walk in hospital room: A little  Climbing 3-5 steps with railing: A little  Basic Mobility - Total Score: 19    Education Documentation  Precautions, taught by Rena Mena PTA at 4/30/2024  3:30 PM.  Learner: Patient  Readiness: Acceptance  Method: Explanation  Response: Verbalizes Understanding    Body Mechanics, taught by Rena Mena PTA at 4/30/2024  3:30 PM.  Learner: Patient  Readiness: Acceptance  Method: Explanation  Response: Verbalizes Understanding    Mobility Training, taught by Rena Mena PTA at 4/30/2024  3:30 PM.  Learner: Patient  Readiness: Acceptance  Method: Explanation  Response: Verbalizes Understanding    Education Comments  No comments found.        OP EDUCATION:       Encounter Problems       Encounter Problems (Active)       Balance       STG - Maintains dynamic standing balance without upper extremity support x 10 minutes with indep (Progressing)       Start:  04/25/24    Expected End:  05/09/24       INTERVENTIONS:  1. Practice standing with minimal support.  2. Educate patient about standing tolerance.  3. Educate patient about independence with gait, transfers, and ADL's.  4. Educate patient about use of assistive device.  5. Educate patient about self-directed care.            Mobility       STG - Patient will ambulate >250' indep (Progressing)       Start:  04/25/24    Expected End:  05/09/24            STG - Patient will ascend and descend 3 steps without HR indep (Progressing)       Start:  04/25/24    Expected End:  05/09/24               PT Transfers       STG - Transfer from bed to chair with indep (Progressing)       Start:  04/25/24    Expected End:  05/09/24            STG - Patient to transfer to and from sit to supine via logroll mod indep (Progressing)       Start:  04/25/24    Expected End:  05/09/24            STG - Patient will  transfer sit to and from stand indep (Progressing)       Start:  04/25/24    Expected End:  05/09/24               Pain - Adult

## 2024-04-30 NOTE — PROGRESS NOTES
04/30/24 1353   Discharge Planning   Patient expects to be discharged to: Home with [facility]     Met with patient, he was provided the  scheduling number to call and make an appt with a pcp in his preferred area, also provided patient with name, address, phone and fax for Dr Chirag Salinas for [facility] paperwork, Dr Salinas will hopefully follow HHC orders until patient establishes with a PCP, if not then Dr Flynn will follow HHC with [facility] home, Dr Miller asked to complete HHC orders via secure chat and she responded yes.     ADOD 2-4 days  BARRIERS- JARRED, DIONY  DISPO [facility]

## 2024-04-30 NOTE — CARE PLAN
Problem: Nutrition  Goal: Less than 5 days NPO/clear liquids  Outcome: Progressing  Goal: Oral intake greater than 50%  Outcome: Progressing  Goal: Oral intake greater 75%  Outcome: Progressing  Goal: Adequate PO fluid intake  Outcome: Progressing  Goal: Nutrition support is meeting 75% of nutrient needs  Outcome: Progressing  Goal: Lab values WNL  Outcome: Progressing  Goal: Electrolytes WNL  Outcome: Progressing  Goal: Promote healing  Outcome: Progressing  Goal: Maintain stable weight  Outcome: Progressing  Goal: Improve ostomy output  Outcome: Progressing     Problem: Safety - Adult  Goal: Free from fall injury  Outcome: Progressing     Problem: Chronic Conditions and Co-morbidities  Goal: Patient's chronic conditions and co-morbidity symptoms are monitored and maintained or improved  Outcome: Progressing     Problem: Pain  Goal: Takes deep breaths with improved pain control throughout the shift  Outcome: Progressing  Goal: Turns in bed with improved pain control throughout the shift  Outcome: Progressing  Goal: Walks with improved pain control throughout the shift  Outcome: Progressing  Goal: Performs ADL's with improved pain control throughout shift  Outcome: Progressing  Goal: Participates in PT with improved pain control throughout the shift  Outcome: Progressing  Goal: Free from opioid side effects throughout the shift  Outcome: Progressing  Goal: Free from acute confusion related to pain meds throughout the shift  Outcome: Progressing   The patient's goals for the shift include      The clinical goals for the shift include remain HDS

## 2024-04-30 NOTE — CARE PLAN
The patient's goals for the shift include  remaining safe and comfortable throughout shift    The clinical goals for the shift include remain HDS    Over the shift, the patient did  make progress toward the following goals.

## 2024-04-30 NOTE — PROGRESS NOTES
"  INFECTIOUS DISEASE DAILY PROGRESS NOTE    SUBJECTIVE:    No overnight events. No new complaints. Afebrile. No rash/itching/diarrhea. Passing gas via stoma but no stool output yet.    OBJECTIVE:  VITALS (Last 24 Hours)  /86 (BP Location: Left arm, Patient Position: Lying)   Pulse 71   Temp 36.5 °C (97.7 °F) (Temporal)   Resp 18   Ht 1.83 m (6' 0.05\")   Wt 73 kg (161 lb)   SpO2 99%   BMI 21.81 kg/m²     PHYSICAL EXAM:  Gen - NAD  ENT - NG tube  Abd - ostomy present, incision with staples, no distension, BS present  Skin - no rashes    ABX: IV Zosyn    LABS:  Lab Results   Component Value Date    WBC 14.8 (H) 04/30/2024    HGB 9.1 (L) 04/30/2024    HCT 28.3 (L) 04/30/2024    MCV 78 (L) 04/30/2024     (H) 04/30/2024     Lab Results   Component Value Date    GLUCOSE 121 (H) 04/30/2024    CALCIUM 7.5 (L) 04/30/2024     (L) 04/30/2024    K 4.5 04/30/2024    CO2 22 04/30/2024    CL 99 04/30/2024    BUN 38 (H) 04/30/2024    CREATININE 6.93 (H) 04/30/2024     Results from last 72 hours   Lab Units 04/29/24  1435 04/28/24  0602   ALK PHOS U/L  --  56   BILIRUBIN TOTAL mg/dL  --  0.5   PROTEIN TOTAL g/dL  --  5.4*   ALT U/L  --  6*   AST U/L  --  17   ALBUMIN g/dL 2.3* 2.3*     Estimated Creatinine Clearance: 14.3 mL/min (A) (by C-G formula based on SCr of 6.93 mg/dL (H)).    ASSESSMENT/PLAN:    Complicated Diverticulitis with Perforation - s/p OR partial colectomy on 4/24, washout  Intra-Abd Abscess due to mixed gram positive/gram negative bacteria  Acute on Chronic Renal Failure - CrCl 14, affects abx dosing    Changed to IV Unasyn 3g Q24H - renally dosed.    Monitoring for adverse effects of abx such as rash/itching/diarrhea - none so far.    Will follow. Thanks!    Nael Arrington MD  [facility]  Office #[phone]      "

## 2024-04-30 NOTE — PROGRESS NOTES
"James Ramirez is a 44 y.o. male on day 5 of admission presenting with Bowel perforation (Multi).    Subjective   Patient is very frustrated with his condition.  Worried about his job  Abdominal pain is doing little better   Feels overall weak       Objective     Physical Exam  Vitals reviewed.   Constitutional:       Appearance: Normal appearance.   HENT:      Head: Normocephalic and atraumatic.      Right Ear: Tympanic membrane, ear canal and external ear normal.      Left Ear: Tympanic membrane, ear canal and external ear normal.      Nose: Nose normal.      Mouth/Throat:      Pharynx: Oropharynx is clear.   Eyes:      Extraocular Movements: Extraocular movements intact.      Conjunctiva/sclera: Conjunctivae normal.      Pupils: Pupils are equal, round, and reactive to light.   Cardiovascular:      Rate and Rhythm: Normal rate and regular rhythm.      Pulses: Normal pulses.      Heart sounds: Normal heart sounds.   Pulmonary:      Effort: Pulmonary effort is normal.      Breath sounds: Normal breath sounds.   Abdominal:      General: Abdomen is flat. Bowel sounds are normal.      Palpations: Abdomen is soft.      Tenderness: There is abdominal tenderness.   Musculoskeletal:      Cervical back: Normal range of motion and neck supple.   Skin:     General: Skin is warm and dry.   Neurological:      General: No focal deficit present.      Mental Status: He is alert and oriented to person, place, and time.   Psychiatric:         Mood and Affect: Mood normal.         Last Recorded Vitals  Blood pressure (!) 147/98, pulse 72, temperature 36.8 °C (98.2 °F), temperature source Temporal, resp. rate 16, height 1.83 m (6' 0.05\"), weight 73 kg (161 lb), SpO2 98%.  Intake/Output last 3 Shifts:  I/O last 3 completed shifts:  In: 921 (12.6 mL/kg) [IV Piggyback:921]  Out: 1481 (20.3 mL/kg) [Urine:1020 (0.4 mL/kg/hr); Emesis/NG output:350; Drains:111]  Weight: 73 kg     Relevant Results              Scheduled " medications  acetaminophen, 650 mg, nasogastric tube, q6h   Or  acetaminophen, 650 mg, nasogastric tube, q6h   Or  acetaminophen, 650 mg, rectal, q6h  docusate sodium, 100 mg, oral, BID  heparin (porcine), 5,000 Units, subcutaneous, q8h LILY  pantoprazole, 40 mg, intravenous, Daily before breakfast  phenylephrine, 1 spray, Each Nostril, Once  piperacillin-tazobactam, 2.25 g, intravenous, q6h  polyethylene glycol, 17 g, oral, Daily      Continuous medications  potassium zhqaotf-H9-2.45%NaCl, 100 mL/hr, Last Rate: 100 mL/hr (04/29/24 2103)      PRN medications  PRN medications: benzocaine-menthol, [Held by provider] morphine, naloxone, ondansetron **OR** ondansetron  Results for orders placed or performed during the hospital encounter of 04/24/24 (from the past 24 hour(s))   Basic Metabolic Panel   Result Value Ref Range    Glucose 121 (H) 74 - 99 mg/dL    Sodium 132 (L) 136 - 145 mmol/L    Potassium 4.5 3.5 - 5.3 mmol/L    Chloride 99 98 - 107 mmol/L    Bicarbonate 22 21 - 32 mmol/L    Anion Gap 16 10 - 20 mmol/L    Urea Nitrogen 33 (H) 6 - 23 mg/dL    Creatinine 5.95 (H) 0.50 - 1.30 mg/dL    eGFR 11 (L) >60 mL/min/1.73m*2    Calcium 8.0 (L) 8.6 - 10.3 mg/dL   CBC   Result Value Ref Range    WBC 15.1 (H) 4.4 - 11.3 x10*3/uL    nRBC 0.0 0.0 - 0.0 /100 WBCs    RBC 3.61 (L) 4.50 - 5.90 x10*6/uL    Hemoglobin 9.3 (L) 13.5 - 17.5 g/dL    Hematocrit 28.7 (L) 41.0 - 52.0 %    MCV 80 80 - 100 fL    MCH 25.8 (L) 26.0 - 34.0 pg    MCHC 32.4 32.0 - 36.0 g/dL    RDW 13.5 11.5 - 14.5 %    Platelets 529 (H) 150 - 450 x10*3/uL   CBC and Auto Differential   Result Value Ref Range    WBC 15.1 (H) 4.4 - 11.3 x10*3/uL    nRBC 0.0 0.0 - 0.0 /100 WBCs    RBC 3.61 (L) 4.50 - 5.90 x10*6/uL    Hemoglobin 9.3 (L) 13.5 - 17.5 g/dL    Hematocrit 28.7 (L) 41.0 - 52.0 %    MCV 80 80 - 100 fL    MCH 25.8 (L) 26.0 - 34.0 pg    MCHC 32.4 32.0 - 36.0 g/dL    RDW 13.5 11.5 - 14.5 %    Platelets 529 (H) 150 - 450 x10*3/uL    Neutrophils % 80.6 40.0 -  80.0 %    Immature Granulocytes %, Automated 1.2 (H) 0.0 - 0.9 %    Lymphocytes % 8.0 13.0 - 44.0 %    Monocytes % 9.2 2.0 - 10.0 %    Eosinophils % 0.8 0.0 - 6.0 %    Basophils % 0.2 0.0 - 2.0 %    Neutrophils Absolute 12.17 (H) 1.20 - 7.70 x10*3/uL    Immature Granulocytes Absolute, Automated 0.18 0.00 - 0.70 x10*3/uL    Lymphocytes Absolute 1.20 1.20 - 4.80 x10*3/uL    Monocytes Absolute 1.39 (H) 0.10 - 1.00 x10*3/uL    Eosinophils Absolute 0.12 0.00 - 0.70 x10*3/uL    Basophils Absolute 0.03 0.00 - 0.10 x10*3/uL   Lavender Top   Result Value Ref Range    Extra Tube Hold for add-ons.    Renal function panel   Result Value Ref Range    Glucose 122 (H) 74 - 99 mg/dL    Sodium 132 (L) 136 - 145 mmol/L    Potassium 4.2 3.5 - 5.3 mmol/L    Chloride 99 98 - 107 mmol/L    Bicarbonate 23 21 - 32 mmol/L    Anion Gap 14 10 - 20 mmol/L    Urea Nitrogen 34 (H) 6 - 23 mg/dL    Creatinine 6.37 (H) 0.50 - 1.30 mg/dL    eGFR 10 (L) >60 mL/min/1.73m*2    Calcium 7.8 (L) 8.6 - 10.3 mg/dL    Phosphorus 4.2 2.5 - 4.9 mg/dL    Albumin 2.3 (L) 3.4 - 5.0 g/dL     XR abdomen 1 view    Result Date: 4/29/2024  Interpreted By:  Armida Helm, STUDY: XR ABDOMEN 1 VIEW;  4/29/2024 11:40 am   INDICATION: Signs/Symptoms:abdominal distension, no stoma output.   COMPARISON:  image from abdominal CT 04/24/2024   ACCESSION NUMBER(S): [id number]   ORDERING CLINICIAN: SUMA CANAS   FINDINGS: 2 supine views of the abdomen obtained.   Multiple overlying monitoring leads. NG tube in place with tip overlying the lateral left upper quadrant. New midline skin clips and 2 surgical drains overlying the central pelvis. Circular ostomy shadow overlying the left mid abdomen. Distended gas and fecal filled loops of large and small bowel. No obvious abdominal mass effect. Small dots of air overlying the right upper quadrant. Included extreme lung bases are clear.       Postsurgical changes and support devices as above. Multiple distended loops of large  and small bowel.   MACRO: None.   Signed by: Armida Helm 4/29/2024 1:24 PM Dictation workstation:   [id number]    XR chest 2 views    Result Date: 4/29/2024  Interpreted By:  Carisas Kim, STUDY: XR CHEST 2 VIEWS;  4/28/2024 11:52 am   INDICATION: Signs/Symptoms:cough.   COMPARISON: 04/26/2024   ACCESSION NUMBER(S): [id number]   ORDERING CLINICIAN: DONNIE OLIVA   FINDINGS: Heart is normal in size.   There appears to be left basilar infiltrate or atelectasis. There appears to be a small effusion.   The feeding tube terminates in the region of gastric fundus. Patient is scoliotic.     COMPARISON OF FINDING: The patchiness at the left base was not present previously.       Left basilar infiltrate or atelectasis. Small effusion.   MACRO: none   Signed by: Carissa Kim 4/29/2024 7:14 AM Dictation workstation:   [id number]    US renal complete    Result Date: 4/28/2024  Interpreted By:  Ciro Patel, STUDY: US RENAL COMPLETE 4/28/2024 4:01 pm   INDICATION: 43 y/o  M with Signs/Symptoms:Worsening renal function tests ordered obstruction.   COMPARISON: None.   ACCESSION NUMBER(S): [id number]   ORDERING CLINICIAN: YASHIRA MOTA   TECHNIQUE: Multiple grayscale ultrasonographic images were obtained through the kidneys and urinary bladder.   FINDINGS: RIGHT KIDNEY: The right kidney is  within normal limits for size, measuring at up to  11.5 cm in length. There is  significantly increased cortical echogenicity within the kidney, which can be seen with medical renal disease.   There is no hydronephrosis or hydroureter identified.   No definite focal renal mass is seen.   No discrete renal calculi are identified.   LEFT KIDNEY: The left kidney is  within normal limits for size, measuring at up to 11.6 cm in length.  There is  significantly increased cortical echogenicity within the kidney, which can be seen with medical renal disease.   There is no hydronephrosis or hydroureter identified.   No definite focal renal  mass is seen.   A nonobstructive calculus is seen in the left kidney, measuring up to 7 mm in diameter.   BLADDER: The urinary bladder is decompressed.       1.  No hydronephrosis or hydroureter bilaterally. 2. Nonobstructive calculus in the left kidney. 3.  Increased cortical echogenicity within the kidneys bilaterally, which can be seen with medical renal disease.   Signed by: Ciro Patel 4/28/2024 5:50 PM Dictation workstation:   [id number]                  Assessment/Plan   Principal Problem:    Bowel perforation (Multi)  Active Problems:    Intra-abdominal abscess (Multi)    Diverticulitis    Acute kidney injury (CMS-HCC)    Acute kidney injury due to sepsis and Vanco toxicity  Creatinine is getting worse  Nephrology following the patient  IV fluids per GI surgeon  On Zosyn for intra-abdominal abscess  Final cultures pending  Continue pain medication  Supportive care       I spent  minutes in the professional and overall care of this patient.      Zahira Miller MD

## 2024-04-30 NOTE — PROGRESS NOTES
Nephrology Consult Progress Note    Admit Date: 4/24/2024    Interval history:  No SOB, not much abdominal pain  Starting to drain more from ostomy, colonic ileus     CURRENT MEDICATIONS:    Current Facility-Administered Medications:     acetaminophen (Tylenol) tablet 650 mg, 650 mg, nasogastric tube, q6h, 650 mg at 04/28/24 0926 **OR** acetaminophen (Tylenol) oral liquid 650 mg, 650 mg, nasogastric tube, q6h, 650 mg at 04/29/24 0931 **OR** acetaminophen (Tylenol) suppository 650 mg, 650 mg, rectal, q6h, Blayne Villar PA-C    ampicillin-sulbactam (Unasyn) in sodium chloride 0.9 % 100 mL 3 g, 3 g, intravenous, q24h, Nael Arrington MD, Stopped at 04/30/24 0945    benzocaine-menthol (Cepastat Sore Throat) lozenge 1 lozenge, 1 lozenge, Mouth/Throat, q2h PRN, Zahira Miller MD, 1 lozenge at 04/27/24 2151    dextrose 5 % and sodium chloride 0.45 % with KCl 20 mEq/L infusion, 100 mL/hr, intravenous, Continuous, Zahira Miller MD, Last Rate: 100 mL/hr at 04/30/24 0628, 100 mL/hr at 04/30/24 0628    diatrizoate iva-diatrizoat sod (Gastrografin 37% organic bound iodine) solution 90 mL, 90 mL, oral, Once, Manuel Sharma PA-C    docusate sodium (Colace) capsule 100 mg, 100 mg, oral, BID, Zahira Miller MD, 100 mg at 04/30/24 0915    heparin (porcine) injection 5,000 Units, 5,000 Units, subcutaneous, q8h LILY, Zahira Miller MD, 5,000 Units at 04/30/24 0627    [Held by provider] morphine injection 2 mg, 2 mg, intravenous, q4h PRN, Zahira iMller MD    naloxone (Narcan) injection 0.2 mg, 0.2 mg, intravenous, PRN, Dionisio Salinas MD    ondansetron (Zofran) tablet 4 mg, 4 mg, oral, q6h PRN **OR** ondansetron (Zofran) injection 4 mg, 4 mg, intravenous, q6h PRN, Blayne Villar PA-C, 4 mg at 04/27/24 2105    pantoprazole (ProtoNix) injection 40 mg, 40 mg, intravenous, Daily before breakfast, Zahira Miller MD, 40 mg at 04/30/24 0627    phenylephrine (Nathen-Synephrine) 0.25 % nasal spray 1 spray, 1 spray, Each Nostril, Once, Blayne Villar,  "PA-ABBEY    polyethylene glycol (Glycolax, Miralax) packet 17 g, 17 g, oral, Daily, Zahira Miller MD, 17 g at 04/28/24 0926       Intake/Output Summary (Last 24 hours) at 4/30/2024 1220  Last data filed at 4/30/2024 1033  Gross per 24 hour   Intake 1850 ml   Output 2680 ml   Net -830 ml       PHYSICAL EXAM:  /76 (BP Location: Right arm, Patient Position: Lying)   Pulse 71   Temp 36.3 °C (97.3 °F) (Oral)   Resp 18   Ht 1.83 m (6' 0.05\")   Wt 73 kg (161 lb)   SpO2 99%   BMI 21.81 kg/m²     Intake/Output Summary (Last 24 hours) at 4/30/2024 1220  Last data filed at 4/30/2024 1033  Gross per 24 hour   Intake 1850 ml   Output 2680 ml   Net -830 ml     Gen: AAO, NAD  Neck: No JVD  Cardiac: RRR  Resp: clear BS  Abd: Soft, mildly tender, +BS, non distended   +ostomy  Ext: No edema   Neuro: moves 4 ext  Peripheral Pulses: Capillary refill <2secs, strong peripheral pulses.  Skin: Skin color, texture, turgor normal, no suspicious rashes or lesions.    Labs:  Results for orders placed or performed during the hospital encounter of 04/24/24 (from the past 24 hour(s))   Lavender Top   Result Value Ref Range    Extra Tube Hold for add-ons.    Renal function panel   Result Value Ref Range    Glucose 122 (H) 74 - 99 mg/dL    Sodium 132 (L) 136 - 145 mmol/L    Potassium 4.2 3.5 - 5.3 mmol/L    Chloride 99 98 - 107 mmol/L    Bicarbonate 23 21 - 32 mmol/L    Anion Gap 14 10 - 20 mmol/L    Urea Nitrogen 34 (H) 6 - 23 mg/dL    Creatinine 6.37 (H) 0.50 - 1.30 mg/dL    eGFR 10 (L) >60 mL/min/1.73m*2    Calcium 7.8 (L) 8.6 - 10.3 mg/dL    Phosphorus 4.2 2.5 - 4.9 mg/dL    Albumin 2.3 (L) 3.4 - 5.0 g/dL   Basic Metabolic Panel   Result Value Ref Range    Glucose 121 (H) 74 - 99 mg/dL    Sodium 133 (L) 136 - 145 mmol/L    Potassium 4.5 3.5 - 5.3 mmol/L    Chloride 99 98 - 107 mmol/L    Bicarbonate 22 21 - 32 mmol/L    Anion Gap 17 10 - 20 mmol/L    Urea Nitrogen 38 (H) 6 - 23 mg/dL    Creatinine 6.93 (H) 0.50 - 1.30 mg/dL    eGFR 9 (L) " >60 mL/min/1.73m*2    Calcium 7.5 (L) 8.6 - 10.3 mg/dL   CBC   Result Value Ref Range    WBC 14.8 (H) 4.4 - 11.3 x10*3/uL    nRBC 0.0 0.0 - 0.0 /100 WBCs    RBC 3.62 (L) 4.50 - 5.90 x10*6/uL    Hemoglobin 9.1 (L) 13.5 - 17.5 g/dL    Hematocrit 28.3 (L) 41.0 - 52.0 %    MCV 78 (L) 80 - 100 fL    MCH 25.1 (L) 26.0 - 34.0 pg    MCHC 32.2 32.0 - 36.0 g/dL    RDW 13.4 11.5 - 14.5 %    Platelets 547 (H) 150 - 450 x10*3/uL        DATA:   Diagnostic tests reviewed for today's visit:    New labs and imaging     Assessment and Plan:  Pt came with diverticulitis, found to have bowel perforation, emergent surgery on 4/24, s/o partial colon resection with diverting end colostomy   - HARLEY: baseline Scr 0.6  Due to ATN and vanco toxicity. No eosinophilia and without signs of AIN  Non oliguric, normal kidney US   Scr still slowly trending up, plateauing by now?  UA without RBC, PCR 2.3 -likely tubular proteinuria mostly-  BP: controlled  Lytes and acid base: acceptable     PLAN:  - keep ivf, adjust based on I/O  - Supportive care, off vanco    Will continue to follow.     Signature: Felix Garcia MD

## 2024-04-30 NOTE — CONSULTS
Wound Care Consult     Visit Date: 4/30/2024      Patient Name: James Ramirez         MRN: [id number]           YOB: 1980     Reason for Consult: NPWT dressing change completed.            Pertinent Labs:   Albumin   Date Value Ref Range Status   04/29/2024 2.3 (L) 3.4 - 5.0 g/dL Final       Wound Assessment:  Wound 04/24/24 Incision Abdomen Lower;Medial (Active)   Wound Image   04/30/24 1205   Site Assessment Unable to assess 04/28/24 2156   [place]-Wound Assessment Dry;Clean;Intact 04/28/24 2156   Shape Linear 04/26/24 1040   Wound Length (cm) 7 cm 04/30/24 1205   Wound Width (cm) 3 cm 04/30/24 1205   Wound Surface Area (cm^2) 21 cm^2 04/30/24 1205   Wound Depth (cm) 1.2 cm 04/30/24 1205   Wound Volume (cm^3) 25.2 cm^3 04/30/24 1205   Wound Healing % 60 04/30/24 1205   Sutures/Staple Line Approximated 04/24/24 2119   Drainage Description Sanguineous 04/28/24 2156   Drainage Amount Small 04/28/24 2156   Dressing Vacuum dressing 04/28/24 2156   Dressing Status Clean;Dry;Occlusive 04/28/24 2156       Wound Team Summary Assessment: Wound Vac applied to midline  (1)  xerofrom (1)  black foam   (Medium )Granufoam       Pressure; 75 mmHg    Plan:  Change wound Vac two times a week and as needed     Consider medicating the patient 30 - 60 minutes prior to dressing change. If there is susanna blood in the tubing (active bleeding), stop the suction and call physician. If the suction is off for greater than 2 hours, remove foam dressing and replace with moist saline dressing. Change canister every week or when full. Remove foam dressing and replace with saline dressing for transfer or discharge.    If patient is discharged prior to next dressing change, please disconnect VAC machine, remove foam dressing apply moist saline dressing. Then place machine in the front soiled utility room on the unit.      Wound Team Plan: WOC will follow while inpatient     While in bed patient should only be on one fitted sheet, and one  chux. Please, do not use brief while patient is resting in bed. Elevate heels off the bed surface at all times. Turn and reposition at least every 2 hours.     Thank you for this consultations, while inpatient please contact with any questions or changes in condition.       Wendi Matos RN BSN,[facility],CWOCN  [phone]   4/30/2024  3:29 PM

## 2024-04-30 NOTE — PROGRESS NOTES
"James Ramirez is a 44 y.o. male on day 6 of admission presenting with Bowel perforation (Multi).    Assessment/Plan   GG challenge ordered. Film showed more of colonic ileus.  Hopefully get NG out soon and diet advanced     Subjective   Feels a little better.  Scant stool in bag       Objective     Physical Exam  NAD  A&Ox3  Non icteric  CTA  RR  Abdomen soft min tender. Wounds clean, intact.  Vac working  Extremities warm, well perfused     Last Recorded Vitals  Blood pressure 124/76, pulse 71, temperature 36.3 °C (97.3 °F), temperature source Oral, resp. rate 18, height 1.83 m (6' 0.05\"), weight 73 kg (161 lb), SpO2 99%.  Intake/Output last 3 Shifts:  I/O last 3 completed shifts:  In: 2171 (29.7 mL/kg) [P.O.:800; IV Piggyback:1371]  Out: 3491 (47.8 mL/kg) [Urine:1520 (0.6 mL/kg/hr); Emesis/NG output:1850; Drains:121]  Weight: 73 kg     Relevant Results    Scheduled medications  acetaminophen, 650 mg, nasogastric tube, q6h   Or  acetaminophen, 650 mg, nasogastric tube, q6h   Or  acetaminophen, 650 mg, rectal, q6h  ampicillin-sulbactam, 3 g, intravenous, q24h  diatrizoate iva-diatrizoat sod, 90 mL, oral, Once  docusate sodium, 100 mg, oral, BID  heparin (porcine), 5,000 Units, subcutaneous, q8h LILY  pantoprazole, 40 mg, intravenous, Daily before breakfast  phenylephrine, 1 spray, Each Nostril, Once  polyethylene glycol, 17 g, oral, Daily      Continuous medications  potassium chwwdjx-A7-1.45%NaCl, 100 mL/hr, Last Rate: 100 mL/hr (04/30/24 0628)      PRN medications  PRN medications: benzocaine-menthol, [Held by provider] morphine, naloxone, ondansetron **OR** ondansetron    Results for orders placed or performed during the hospital encounter of 04/24/24 (from the past 24 hour(s))   Lavender Top   Result Value Ref Range    Extra Tube Hold for add-ons.    Renal function panel   Result Value Ref Range    Glucose 122 (H) 74 - 99 mg/dL    Sodium 132 (L) 136 - 145 mmol/L    Potassium 4.2 3.5 - 5.3 mmol/L    Chloride 99 98 - " 107 mmol/L    Bicarbonate 23 21 - 32 mmol/L    Anion Gap 14 10 - 20 mmol/L    Urea Nitrogen 34 (H) 6 - 23 mg/dL    Creatinine 6.37 (H) 0.50 - 1.30 mg/dL    eGFR 10 (L) >60 mL/min/1.73m*2    Calcium 7.8 (L) 8.6 - 10.3 mg/dL    Phosphorus 4.2 2.5 - 4.9 mg/dL    Albumin 2.3 (L) 3.4 - 5.0 g/dL   Basic Metabolic Panel   Result Value Ref Range    Glucose 121 (H) 74 - 99 mg/dL    Sodium 133 (L) 136 - 145 mmol/L    Potassium 4.5 3.5 - 5.3 mmol/L    Chloride 99 98 - 107 mmol/L    Bicarbonate 22 21 - 32 mmol/L    Anion Gap 17 10 - 20 mmol/L    Urea Nitrogen 38 (H) 6 - 23 mg/dL    Creatinine 6.93 (H) 0.50 - 1.30 mg/dL    eGFR 9 (L) >60 mL/min/1.73m*2    Calcium 7.5 (L) 8.6 - 10.3 mg/dL   CBC   Result Value Ref Range    WBC 14.8 (H) 4.4 - 11.3 x10*3/uL    nRBC 0.0 0.0 - 0.0 /100 WBCs    RBC 3.62 (L) 4.50 - 5.90 x10*6/uL    Hemoglobin 9.1 (L) 13.5 - 17.5 g/dL    Hematocrit 28.3 (L) 41.0 - 52.0 %    MCV 78 (L) 80 - 100 fL    MCH 25.1 (L) 26.0 - 34.0 pg    MCHC 32.2 32.0 - 36.0 g/dL    RDW 13.4 11.5 - 14.5 %    Platelets 547 (H) 150 - 450 x10*3/uL           I spent 25 minutes in the professional and overall care of this patient.      Dionisio Salinas MD

## 2024-05-01 ENCOUNTER — HOME HEALTH ADMISSION (OUTPATIENT)
Dept: HOME HEALTH SERVICES | Facility: HOME HEALTH | Age: 44
End: 2024-05-01
Payer: COMMERCIAL

## 2024-05-01 ENCOUNTER — DOCUMENTATION (OUTPATIENT)
Dept: HOME HEALTH SERVICES | Facility: HOME HEALTH | Age: 44
End: 2024-05-01

## 2024-05-01 ENCOUNTER — APPOINTMENT (OUTPATIENT)
Dept: RADIOLOGY | Facility: HOSPITAL | Age: 44
DRG: 329 | End: 2024-05-01
Payer: COMMERCIAL

## 2024-05-01 PROBLEM — I10 PRIMARY HYPERTENSION: Status: ACTIVE | Noted: 2024-05-01

## 2024-05-01 LAB
ANION GAP SERPL CALC-SCNC: 16 MMOL/L (ref 10–20)
BUN SERPL-MCNC: 38 MG/DL (ref 6–23)
CALCIUM SERPL-MCNC: 8 MG/DL (ref 8.6–10.3)
CHLORIDE SERPL-SCNC: 101 MMOL/L (ref 98–107)
CO2 SERPL-SCNC: 20 MMOL/L (ref 21–32)
CREAT SERPL-MCNC: 7.15 MG/DL (ref 0.5–1.3)
CYSTATIN C SERPL-MCNC: 2.3 MG/L (ref 0.5–1.2)
EGFRCR SERPLBLD CKD-EPI 2021: 9 ML/MIN/1.73M*2
GFR/BSA.PRED SERPLBLD CYS-BASED-ARV: 28 ML/MIN/BSA
GLUCOSE SERPL-MCNC: 109 MG/DL (ref 74–99)
HOLD SPECIMEN: NORMAL
POTASSIUM SERPL-SCNC: 4.4 MMOL/L (ref 3.5–5.3)
SODIUM SERPL-SCNC: 133 MMOL/L (ref 136–145)

## 2024-05-01 PROCEDURE — 1100000001 HC PRIVATE ROOM DAILY

## 2024-05-01 PROCEDURE — 71045 X-RAY EXAM CHEST 1 VIEW: CPT

## 2024-05-01 PROCEDURE — 99232 SBSQ HOSP IP/OBS MODERATE 35: CPT

## 2024-05-01 PROCEDURE — 99232 SBSQ HOSP IP/OBS MODERATE 35: CPT | Performed by: INTERNAL MEDICINE

## 2024-05-01 PROCEDURE — 74018 RADEX ABDOMEN 1 VIEW: CPT

## 2024-05-01 PROCEDURE — 2500000001 HC RX 250 WO HCPCS SELF ADMINISTERED DRUGS (ALT 637 FOR MEDICARE OP): Performed by: INTERNAL MEDICINE

## 2024-05-01 PROCEDURE — 2500000004 HC RX 250 GENERAL PHARMACY W/ HCPCS (ALT 636 FOR OP/ED): Performed by: INTERNAL MEDICINE

## 2024-05-01 PROCEDURE — 99233 SBSQ HOSP IP/OBS HIGH 50: CPT | Performed by: INTERNAL MEDICINE

## 2024-05-01 PROCEDURE — 80048 BASIC METABOLIC PNL TOTAL CA: CPT | Performed by: INTERNAL MEDICINE

## 2024-05-01 PROCEDURE — C9113 INJ PANTOPRAZOLE SODIUM, VIA: HCPCS | Performed by: INTERNAL MEDICINE

## 2024-05-01 PROCEDURE — 2500000004 HC RX 250 GENERAL PHARMACY W/ HCPCS (ALT 636 FOR OP/ED)

## 2024-05-01 PROCEDURE — 36415 COLL VENOUS BLD VENIPUNCTURE: CPT | Performed by: INTERNAL MEDICINE

## 2024-05-01 PROCEDURE — 74022 RADEX COMPL AQT ABD SERIES: CPT | Performed by: RADIOLOGY

## 2024-05-01 RX ORDER — METOPROLOL TARTRATE 25 MG/1
25 TABLET, FILM COATED ORAL 2 TIMES DAILY
Status: DISCONTINUED | OUTPATIENT
Start: 2024-05-01 | End: 2024-05-09 | Stop reason: HOSPADM

## 2024-05-01 RX ADMIN — METOPROLOL TARTRATE 25 MG: 25 TABLET, FILM COATED ORAL at 21:08

## 2024-05-01 RX ADMIN — POTASSIUM CHLORIDE, DEXTROSE MONOHYDRATE AND SODIUM CHLORIDE 100 ML/HR: 150; 5; 450 INJECTION, SOLUTION INTRAVENOUS at 21:20

## 2024-05-01 RX ADMIN — METOPROLOL TARTRATE 25 MG: 25 TABLET, FILM COATED ORAL at 11:06

## 2024-05-01 RX ADMIN — DOCUSATE SODIUM 100 MG: 100 CAPSULE, LIQUID FILLED ORAL at 21:08

## 2024-05-01 RX ADMIN — MORPHINE SULFATE 2 MG: 2 INJECTION, SOLUTION INTRAMUSCULAR; INTRAVENOUS at 00:03

## 2024-05-01 RX ADMIN — HEPARIN SODIUM 5000 UNITS: 5000 INJECTION INTRAVENOUS; SUBCUTANEOUS at 14:15

## 2024-05-01 RX ADMIN — HEPARIN SODIUM 5000 UNITS: 5000 INJECTION INTRAVENOUS; SUBCUTANEOUS at 21:08

## 2024-05-01 RX ADMIN — POTASSIUM CHLORIDE, DEXTROSE MONOHYDRATE AND SODIUM CHLORIDE 100 ML/HR: 150; 5; 450 INJECTION, SOLUTION INTRAVENOUS at 06:26

## 2024-05-01 RX ADMIN — AMPICILLIN SODIUM AND SULBACTAM SODIUM 3 G: 2; 1 INJECTION, POWDER, FOR SOLUTION INTRAMUSCULAR; INTRAVENOUS at 11:06

## 2024-05-01 RX ADMIN — PANTOPRAZOLE SODIUM 40 MG: 40 INJECTION, POWDER, FOR SOLUTION INTRAVENOUS at 06:26

## 2024-05-01 RX ADMIN — HEPARIN SODIUM 5000 UNITS: 5000 INJECTION INTRAVENOUS; SUBCUTANEOUS at 06:25

## 2024-05-01 ASSESSMENT — COGNITIVE AND FUNCTIONAL STATUS - GENERAL
DAILY ACTIVITIY SCORE: 24
DAILY ACTIVITIY SCORE: 24
CLIMB 3 TO 5 STEPS WITH RAILING: A LITTLE
MOBILITY SCORE: 23
CLIMB 3 TO 5 STEPS WITH RAILING: A LITTLE
MOBILITY SCORE: 23

## 2024-05-01 ASSESSMENT — PAIN SCALES - WONG BAKER
WONGBAKER_NUMERICALRESPONSE: NO HURT
WONGBAKER_NUMERICALRESPONSE: NO HURT

## 2024-05-01 ASSESSMENT — PAIN SCALES - GENERAL
PAINLEVEL_OUTOF10: 0 - NO PAIN
PAINLEVEL_OUTOF10: 8
PAINLEVEL_OUTOF10: 0 - NO PAIN

## 2024-05-01 ASSESSMENT — PAIN DESCRIPTION - LOCATION: LOCATION: THROAT

## 2024-05-01 ASSESSMENT — PAIN - FUNCTIONAL ASSESSMENT: PAIN_FUNCTIONAL_ASSESSMENT: 0-10

## 2024-05-01 ASSESSMENT — PAIN INTENSITY VAS: VAS_PAIN_BASICVITALS_IP: 0

## 2024-05-01 NOTE — PROGRESS NOTES
"James Ramirez is a 44 y.o. male on day 6 of admission presenting with Bowel perforation (Multi).    Subjective   Patient is very frustrated with his condition.  Worried about his job  Abdominal pain is doing little better   Feels overall weak       Objective     Physical Exam  Vitals reviewed.   Constitutional:       Appearance: Normal appearance.   HENT:      Head: Normocephalic and atraumatic.      Right Ear: Tympanic membrane, ear canal and external ear normal.      Left Ear: Tympanic membrane, ear canal and external ear normal.      Nose: Nose normal.      Mouth/Throat:      Pharynx: Oropharynx is clear.   Eyes:      Extraocular Movements: Extraocular movements intact.      Conjunctiva/sclera: Conjunctivae normal.      Pupils: Pupils are equal, round, and reactive to light.   Cardiovascular:      Rate and Rhythm: Normal rate and regular rhythm.      Pulses: Normal pulses.      Heart sounds: Normal heart sounds.   Pulmonary:      Effort: Pulmonary effort is normal.      Breath sounds: Normal breath sounds.   Abdominal:      General: Abdomen is flat. Bowel sounds are normal.      Palpations: Abdomen is soft.      Tenderness: There is abdominal tenderness.   Musculoskeletal:      Cervical back: Normal range of motion and neck supple.   Skin:     General: Skin is warm and dry.   Neurological:      General: No focal deficit present.      Mental Status: He is alert and oriented to person, place, and time.   Psychiatric:         Mood and Affect: Mood normal.         Last Recorded Vitals  Blood pressure (!) 151/99, pulse 76, temperature 36.9 °C (98.4 °F), temperature source Temporal, resp. rate 16, height 1.83 m (6' 0.05\"), weight 73 kg (161 lb), SpO2 99%.  Intake/Output last 3 Shifts:  I/O last 3 completed shifts:  In: 2948.3 (40.4 mL/kg) [P.O.:800; IV Piggyback:2148.3]  Out: 4280 (58.6 mL/kg) [Urine:1370 (0.5 mL/kg/hr); Emesis/NG output:2700; Drains:10; Stool:200]  Weight: 73 kg     Relevant Results            "   Scheduled medications  acetaminophen, 650 mg, nasogastric tube, q6h   Or  acetaminophen, 650 mg, nasogastric tube, q6h   Or  acetaminophen, 650 mg, rectal, q6h  ampicillin-sulbactam, 3 g, intravenous, q24h  diatrizoate iva-diatrizoat sod, 90 mL, oral, Once  docusate sodium, 100 mg, oral, BID  heparin (porcine), 5,000 Units, subcutaneous, q8h LILY  pantoprazole, 40 mg, intravenous, Daily before breakfast  phenylephrine, 1 spray, Each Nostril, Once  polyethylene glycol, 17 g, oral, Daily      Continuous medications  potassium jmoxgdp-J2-0.45%NaCl, 100 mL/hr, Last Rate: 100 mL/hr (04/30/24 1755)      PRN medications  PRN medications: benzocaine-menthol, [Held by provider] morphine, naloxone, ondansetron **OR** ondansetron  Results for orders placed or performed during the hospital encounter of 04/24/24 (from the past 24 hour(s))   Basic Metabolic Panel   Result Value Ref Range    Glucose 121 (H) 74 - 99 mg/dL    Sodium 133 (L) 136 - 145 mmol/L    Potassium 4.5 3.5 - 5.3 mmol/L    Chloride 99 98 - 107 mmol/L    Bicarbonate 22 21 - 32 mmol/L    Anion Gap 17 10 - 20 mmol/L    Urea Nitrogen 38 (H) 6 - 23 mg/dL    Creatinine 6.93 (H) 0.50 - 1.30 mg/dL    eGFR 9 (L) >60 mL/min/1.73m*2    Calcium 7.5 (L) 8.6 - 10.3 mg/dL   CBC   Result Value Ref Range    WBC 14.8 (H) 4.4 - 11.3 x10*3/uL    nRBC 0.0 0.0 - 0.0 /100 WBCs    RBC 3.62 (L) 4.50 - 5.90 x10*6/uL    Hemoglobin 9.1 (L) 13.5 - 17.5 g/dL    Hematocrit 28.3 (L) 41.0 - 52.0 %    MCV 78 (L) 80 - 100 fL    MCH 25.1 (L) 26.0 - 34.0 pg    MCHC 32.2 32.0 - 36.0 g/dL    RDW 13.4 11.5 - 14.5 %    Platelets 547 (H) 150 - 450 x10*3/uL     XR abdomen 1 view    Result Date: 4/30/2024  Interpreted By:  Armida Helm, STUDY: XR ABDOMEN 1 VIEW;  4/30/2024 10:26 am   INDICATION: Signs/Symptoms:Gastrograffin challenge. Please coordinate with nursing staff.   COMPARISON: 04/29/2024   ACCESSION NUMBER(S): [id number]   ORDERING CLINICIAN: FRANCINE HC   FINDINGS: 2 AP portable supine  views of the abdomen obtained.   Midline lower pelvis inadvertently not fully included. Dense contrast material in NG tube, stomach and nondilated loops of bowel administered by unknown person at unknown time. Tip of NG tube overlies the proximal stomach. Midline skin clips and 2 surgical drains overlying the pelvis again seen. Small lucent air collections overlying the right upper quadrant are less conspicuous. Included extreme lung bases are clear.       Positive contrast material in nondilated loops of bowel throughout the abdomen.   MACRO: None.   Signed by: Armida Helm 4/30/2024 11:02 AM Dictation workstation:   [id number]    XR abdomen 1 view    Result Date: 4/29/2024  Interpreted By:  Armida Helm, STUDY: XR ABDOMEN 1 VIEW;  4/29/2024 11:40 am   INDICATION: Signs/Symptoms:abdominal distension, no stoma output.   COMPARISON:  image from abdominal CT 04/24/2024   ACCESSION NUMBER(S): [id number]   ORDERING CLINICIAN: SUMA CANAS   FINDINGS: 2 supine views of the abdomen obtained.   Multiple overlying monitoring leads. NG tube in place with tip overlying the lateral left upper quadrant. New midline skin clips and 2 surgical drains overlying the central pelvis. Circular ostomy shadow overlying the left mid abdomen. Distended gas and fecal filled loops of large and small bowel. No obvious abdominal mass effect. Small dots of air overlying the right upper quadrant. Included extreme lung bases are clear.       Postsurgical changes and support devices as above. Multiple distended loops of large and small bowel.   MACRO: None.   Signed by: Armida Helm 4/29/2024 1:24 PM Dictation workstation:   [id number]                  Assessment/Plan   Principal Problem:    Bowel perforation (Multi)  Active Problems:    Intra-abdominal abscess (Multi)    Diverticulitis    Acute kidney injury (CMS-HCC)    Acute kidney injury due to sepsis and Vanco toxicity  Creatinine is getting worse  Nephrology following the  patient  IV fluids per GI surgeon  Antibiotics switched to Unasyn  Continue pain medication  Supportive care  Home care ordered for discharge       I spent  minutes in the professional and overall care of this patient.      Zahira Miller MD

## 2024-05-01 NOTE — PROGRESS NOTES
"James Ramirez is a 44 y.o. male on day 7 of admission presenting with Bowel perforation (Multi).    Subjective   Patient is awake in bed this morning. He reports irritation for NGT.       Objective     Physical Exam  Constitutional:       Appearance: Normal appearance.   HENT:      Nose:      Comments: NGT in place  Cardiovascular:      Rate and Rhythm: Normal rate and regular rhythm.   Pulmonary:      Effort: Pulmonary effort is normal.      Comments: Non labored breathing on RA  Abdominal:      General: There is distension.      Palpations: Abdomen is soft.      Tenderness: There is no abdominal tenderness.      Comments: Midline incision C/D/I, edges well approximated and closed with staples.  Wound vac in place holding suction.  2 JPs in place both 5cc sero sang output.    Skin:     General: Skin is warm and dry.   Neurological:      General: No focal deficit present.      Mental Status: He is alert and oriented to person, place, and time. Mental status is at baseline.   Psychiatric:         Attention and Perception: Attention normal.         Mood and Affect: Mood normal.         Speech: Speech normal.         Behavior: Behavior normal.         Cognition and Memory: Cognition normal.         Last Recorded Vitals  Blood pressure (!) 161/107, pulse 78, temperature 36.3 °C (97.4 °F), temperature source Oral, resp. rate 16, height 1.83 m (6' 0.05\"), weight 71.4 kg (157 lb 6.5 oz), SpO2 100%.  Intake/Output last 3 Shifts:  I/O last 3 completed shifts:  In: 2948.3 (40.4 mL/kg) [P.O.:800; IV Piggyback:2148.3]  Out: 4520 (61.9 mL/kg) [Urine:1450 (0.6 mL/kg/hr); Emesis/NG output:2700; Drains:170; Stool:200]  Weight: 73 kg     Relevant Results  Scheduled medications  acetaminophen, 650 mg, nasogastric tube, q6h   Or  acetaminophen, 650 mg, nasogastric tube, q6h   Or  acetaminophen, 650 mg, rectal, q6h  ampicillin-sulbactam, 3 g, intravenous, q24h  diatrizoate iva-diatrizoat sod, 90 mL, oral, Once  docusate sodium, 100 mg, " oral, BID  heparin (porcine), 5,000 Units, subcutaneous, q8h LILY  metoprolol tartrate, 25 mg, oral, BID  pantoprazole, 40 mg, intravenous, Daily before breakfast  phenylephrine, 1 spray, Each Nostril, Once  polyethylene glycol, 17 g, oral, Daily      Continuous medications  potassium xapmpdl-P7-9.45%NaCl, 100 mL/hr, Last Rate: 100 mL/hr (05/01/24 0626)      PRN medications  PRN medications: benzocaine-menthol, [Held by provider] morphine, naloxone, ondansetron **OR** ondansetron  Results for orders placed or performed during the hospital encounter of 04/24/24 (from the past 24 hour(s))   Basic Metabolic Panel   Result Value Ref Range    Glucose 109 (H) 74 - 99 mg/dL    Sodium 133 (L) 136 - 145 mmol/L    Potassium 4.4 3.5 - 5.3 mmol/L    Chloride 101 98 - 107 mmol/L    Bicarbonate 20 (L) 21 - 32 mmol/L    Anion Gap 16 10 - 20 mmol/L    Urea Nitrogen 38 (H) 6 - 23 mg/dL    Creatinine 7.15 (H) 0.50 - 1.30 mg/dL    eGFR 9 (L) >60 mL/min/1.73m*2    Calcium 8.0 (L) 8.6 - 10.3 mg/dL   Lavender Top   Result Value Ref Range    Extra Tube Hold for add-ons.      XR chest abdomen for OG NG placement   Final Result   1.  Enteric tube terminates in the left upper quadrant with side hole   below the GE junction. Gaseous distended loops of bowel in the   visualized upper abdomen may relate to ileus. Attention on follow-up   is advised.   2.  No acute cardiopulmonary process.        MACRO:   None        Signed by: Randolph Gtz 5/1/2024 1:26 AM   Dictation workstation:   [id number]      XR abdomen 1 view   Final Result   Positive contrast material in nondilated loops of bowel throughout   the abdomen.        MACRO:   None.        Signed by: Armida Helm 4/30/2024 11:02 AM   Dictation workstation:   [id number]      XR abdomen 1 view   Final Result   Postsurgical changes and support devices as above. Multiple distended   loops of large and small bowel.        MACRO:   None.        Signed by: Armida Helm 4/29/2024 1:24 PM    Dictation workstation:   [id number]      US renal complete   Final Result   1.  No hydronephrosis or hydroureter bilaterally.   2. Nonobstructive calculus in the left kidney.   3.  Increased cortical echogenicity within the kidneys bilaterally,   which can be seen with medical renal disease.        Signed by: Ciro Patel 4/28/2024 5:50 PM   Dictation workstation:   [id number]      XR chest 2 views   Final Result   Left basilar infiltrate or atelectasis. Small effusion.        MACRO:   none        Signed by: Carissa Kim 4/29/2024 7:14 AM   Dictation workstation:   [id number]      XR chest 1 view   Final Result   Hypoventilatory exam.        Mild cardiomegaly.        NG tube in place as described.        MACRO:   None        Signed by: Reji Senior 4/26/2024 1:45 PM   Dictation workstation:   [id number]      XR chest 1 view   Final Result   1. No acute cardiopulmonary process.        MACRO:   None.        Signed by: Joleen Pascual 4/24/2024 11:59 AM   Dictation workstation:   [id number]      CT abdomen pelvis w IV contrast   Final Result   1. Central intraabdominal abscess measuring 9.1 x 7.9 x 3.8 cm..   There are multiple dots of air around this larger abscess consistent   with localized perforation and viscus perforation. This is likely   perforated sigmoid diverticulitis. There are smaller abscesses in the   abdomen and pelvis.        2. This abscess extends into the anterior abdominal wall between the   rectus abdominis muscles.        MACRO:   Julianne Meneses discussed the significance and urgency of this   critical finding by secure chat with Dionisio Salinas and AILYN PANG   on 4/24/2024 at 11:40 am.  (**-RCF-**) Findings:  See findings.        Signed by: Julianne Meneses 4/24/2024 11:41 AM   Dictation workstation:   [id number]               Assessment/Plan   Principal Problem:    Bowel perforation (Multi)  Active Problems:    Intra-abdominal abscess (Multi)    Diverticulitis    Acute kidney  injury (CMS-HCC)    Plan: POD 7 Exploratory laparotomy, drainage of abdominal wall abscess, partial colon resection with diverting end colostomy Findings: This appeared to be a colon cancer that had eroded into the abdominal wall and was densely adherent to the bladder.  Damage control operation was performed with washout placement of drains and diverting end colostomy   - NGT removed 5/1  - Started on Soft diet, go slow  - PRN pain control  - PRN antiemetic  - Encourage IS  - Encourage OOB   - DVT prophylaxis: SCDs/ Heparin    Dispo: Discussed with Dr. Salinas, will remove NGT today and trial diet.        I spent 35 minutes in the professional and overall care of this patient.      Manuel Sharma PA-C

## 2024-05-01 NOTE — NURSING NOTE
PT C/O of some abdominal discomfort and pain at incision site. He has tylenol scheduled but declines it.     he said he's just sore I explained to him that its expected after surgery, when asked about why the refusal of tylenol again he said nevermind to pain meds all together but he doesn't think it'll help. I explained that if your not in serve pain and its just mild tylenol would probably help. He said nevermind

## 2024-05-01 NOTE — PROGRESS NOTES
"James Ramirez is a 44 y.o. male on day 7 of admission presenting with Bowel perforation (Multi).    Assessment/Plan   Status post partial colectomy and end colostomy.  Developed renal failure and postoperative ileus.  Ileus seems to be improving.  We are slowly advancing diet.  Nephrology is following.    Subjective   Starting to have increased output from colostomy bag.  Tolerating liquids.       Objective     Physical Exam  NAD  A&Ox3  Non icteric  CTA  RR  Abdomen soft min tender. Wounds clean, intact.  DUSTY drain serosanguineous.  Extremities warm, well perfused     Last Recorded Vitals  Blood pressure (!) 144/91, pulse 78, temperature 36.3 °C (97.4 °F), temperature source Oral, resp. rate 17, height 1.83 m (6' 0.05\"), weight 71.4 kg (157 lb 6.5 oz), SpO2 100%.  Intake/Output last 3 Shifts:  I/O last 3 completed shifts:  In: 2948.3 (40.4 mL/kg) [P.O.:800; IV Piggyback:2148.3]  Out: 4520 (61.9 mL/kg) [Urine:1450 (0.6 mL/kg/hr); Emesis/NG output:2700; Drains:170; Stool:200]  Weight: 73 kg     Relevant Results    Scheduled medications  acetaminophen, 650 mg, nasogastric tube, q6h   Or  acetaminophen, 650 mg, nasogastric tube, q6h   Or  acetaminophen, 650 mg, rectal, q6h  ampicillin-sulbactam, 3 g, intravenous, q24h  diatrizoate iva-diatrizoat sod, 90 mL, oral, Once  docusate sodium, 100 mg, oral, BID  heparin (porcine), 5,000 Units, subcutaneous, q8h LILY  metoprolol tartrate, 25 mg, oral, BID  pantoprazole, 40 mg, intravenous, Daily before breakfast  phenylephrine, 1 spray, Each Nostril, Once  polyethylene glycol, 17 g, oral, Daily      Continuous medications  potassium izizoom-Y7-7.45%NaCl, 100 mL/hr, Last Rate: 100 mL/hr (05/01/24 0626)      PRN medications  PRN medications: benzocaine-menthol, [Held by provider] morphine, naloxone, ondansetron **OR** ondansetron    Results for orders placed or performed during the hospital encounter of 04/24/24 (from the past 24 hour(s))   Basic Metabolic Panel   Result Value Ref " Range    Glucose 109 (H) 74 - 99 mg/dL    Sodium 133 (L) 136 - 145 mmol/L    Potassium 4.4 3.5 - 5.3 mmol/L    Chloride 101 98 - 107 mmol/L    Bicarbonate 20 (L) 21 - 32 mmol/L    Anion Gap 16 10 - 20 mmol/L    Urea Nitrogen 38 (H) 6 - 23 mg/dL    Creatinine 7.15 (H) 0.50 - 1.30 mg/dL    eGFR 9 (L) >60 mL/min/1.73m*2    Calcium 8.0 (L) 8.6 - 10.3 mg/dL   Lavender Top   Result Value Ref Range    Extra Tube Hold for add-ons.            I spent 25 minutes in the professional and overall care of this patient.      Dionisio Salinas MD

## 2024-05-01 NOTE — PROGRESS NOTES
05/01/24 8970   Discharge Planning   Patient expects to be discharged to: Home- does not have HHC benefits     Met with patient with at bedside  Explained role of TCC  Patient is aware that per [facility] patient does not have HHC benefits, he is agreeable to going to outpt appt 2x weekly for vac changes or agreeable to no vac at home and daily dressing changes, states his father probably will not be able to help but he has an uncle who will assist with dressing changes, secure chat message was started regarding this new development with no [facility] insurance with surgery PA, TCC, wound care nurse, TCC supervisor and bedside RN.    ADOD 2-4 days  BARRIERS NG removed, needs to tolerate increased diet and RBF  DISPO home

## 2024-05-01 NOTE — PROGRESS NOTES
"  INFECTIOUS DISEASE DAILY PROGRESS NOTE    SUBJECTIVE:    No overnight events. No new complaints. Afebrile. No rash/itching/diarrhea. Still with NG tube and not much ostomy function.    OBJECTIVE:  VITALS (Last 24 Hours)  BP (!) 157/92 (BP Location: Right arm, Patient Position: Lying)   Pulse 74   Temp 36.8 °C (98.2 °F) (Temporal)   Resp 16   Ht 1.83 m (6' 0.05\")   Wt 71.4 kg (157 lb 6.5 oz)   SpO2 98%   BMI 21.32 kg/m²     PHYSICAL EXAM:  Gen - NAD  ENT - NG tube  Abd - ostomy present, incision with staples, no distension, BS present but hypoactive  Skin - no rash    ABX: IV Unasyn    LABS:  Lab Results   Component Value Date    WBC 14.8 (H) 04/30/2024    HGB 9.1 (L) 04/30/2024    HCT 28.3 (L) 04/30/2024    MCV 78 (L) 04/30/2024     (H) 04/30/2024     Lab Results   Component Value Date    GLUCOSE 109 (H) 05/01/2024    CALCIUM 8.0 (L) 05/01/2024     (L) 05/01/2024    K 4.4 05/01/2024    CO2 20 (L) 05/01/2024     05/01/2024    BUN 38 (H) 05/01/2024    CREATININE 7.15 (H) 05/01/2024     Results from last 72 hours   Lab Units 04/29/24  1435   ALBUMIN g/dL 2.3*     Estimated Creatinine Clearance: 13.6 mL/min (A) (by C-G formula based on SCr of 7.15 mg/dL (H)).    ASSESSMENT/PLAN:    Complicated Diverticulitis with Perforation - s/p OR partial colectomy on 4/24, washout  Intra-Abd Abscess due to mixed gram positive/gram negative bacteria  Acute on Chronic Renal Failure - CrCl 13.6 affects abx dosing    IV Unasyn 3g Q24H - renally dosed.    Monitoring for adverse effects of abx such as rash/itching/diarrhea - none so far.    Will follow. Thanks!    Nael Arrington MD  [facility]  Office #[phone]      "

## 2024-05-01 NOTE — PROGRESS NOTES
Nephrology Consult Progress Note    Admit Date: 4/24/2024    Interval history:  No SOB, not much abdominal pain  Planning to remove NGT today     CURRENT MEDICATIONS:    Current Facility-Administered Medications:     acetaminophen (Tylenol) tablet 650 mg, 650 mg, nasogastric tube, q6h, 650 mg at 04/28/24 0926 **OR** acetaminophen (Tylenol) oral liquid 650 mg, 650 mg, nasogastric tube, q6h, 650 mg at 04/29/24 0931 **OR** acetaminophen (Tylenol) suppository 650 mg, 650 mg, rectal, q6h, Blayne Villar PA-C    ampicillin-sulbactam (Unasyn) in sodium chloride 0.9 % 100 mL 3 g, 3 g, intravenous, q24h, Nael Arrington MD, Last Rate: 200 mL/hr at 05/01/24 1106, 3 g at 05/01/24 1106    benzocaine-menthol (Cepastat Sore Throat) lozenge 1 lozenge, 1 lozenge, Mouth/Throat, q2h PRN, Zahira Miller MD, 1 lozenge at 04/27/24 2151    dextrose 5 % and sodium chloride 0.45 % with KCl 20 mEq/L infusion, 100 mL/hr, intravenous, Continuous, Zahira Miller MD, Last Rate: 100 mL/hr at 05/01/24 0626, 100 mL/hr at 05/01/24 0626    diatrizoate iva-diatrizoat sod (Gastrografin 37% organic bound iodine) solution 90 mL, 90 mL, oral, Once, Manuel Sharma PA-C    docusate sodium (Colace) capsule 100 mg, 100 mg, oral, BID, Zahira Miller MD, 100 mg at 04/30/24 2046    heparin (porcine) injection 5,000 Units, 5,000 Units, subcutaneous, q8h LILY, Zahira Miller MD, 5,000 Units at 05/01/24 0625    metoprolol tartrate (Lopressor) tablet 25 mg, 25 mg, oral, BID, Zahira Miller MD, 25 mg at 05/01/24 1106    [Held by provider] morphine injection 2 mg, 2 mg, intravenous, q4h PRN, Zahira Miller MD    naloxone (Narcan) injection 0.2 mg, 0.2 mg, intravenous, PRN, Dionisio Salinas MD    ondansetron (Zofran) tablet 4 mg, 4 mg, oral, q6h PRN **OR** ondansetron (Zofran) injection 4 mg, 4 mg, intravenous, q6h PRN, Blayne Villar PA-C, 4 mg at 04/27/24 2105    pantoprazole (ProtoNix) injection 40 mg, 40 mg, intravenous, Daily before breakfast, Zahira Miller MD, 40 mg at  "05/01/24 0626    phenylephrine (Nathen-Synephrine) 0.25 % nasal spray 1 spray, 1 spray, Each Nostril, Once, Blayne Villar PA-C    polyethylene glycol (Glycolax, Miralax) packet 17 g, 17 g, oral, Daily, Zahira Miller MD, 17 g at 04/28/24 0926       Intake/Output Summary (Last 24 hours) at 5/1/2024 1153  Last data filed at 4/30/2024 2211  Gross per 24 hour   Intake 1098.33 ml   Output 2060 ml   Net -961.67 ml       PHYSICAL EXAM:  BP (!) 144/91 (BP Location: Left arm, Patient Position: Lying)   Pulse 78   Temp 36.3 °C (97.4 °F) (Oral)   Resp 17   Ht 1.83 m (6' 0.05\")   Wt 71.4 kg (157 lb 6.5 oz)   SpO2 100%   BMI 21.32 kg/m²     Intake/Output Summary (Last 24 hours) at 5/1/2024 1153  Last data filed at 4/30/2024 2211  Gross per 24 hour   Intake 1098.33 ml   Output 2060 ml   Net -961.67 ml     Gen: AAO, NAD  Neck: No JVD  Cardiac: RRR  Resp: clear BS  Abd: Soft, mildly tender, +BS, non distended   +ostomy  Ext: No edema   Neuro: moves 4 ext  Peripheral Pulses: Capillary refill <2secs, strong peripheral pulses.  Skin: Skin color, texture, turgor normal, no suspicious rashes or lesions.    Labs:  Results for orders placed or performed during the hospital encounter of 04/24/24 (from the past 24 hour(s))   Basic Metabolic Panel   Result Value Ref Range    Glucose 109 (H) 74 - 99 mg/dL    Sodium 133 (L) 136 - 145 mmol/L    Potassium 4.4 3.5 - 5.3 mmol/L    Chloride 101 98 - 107 mmol/L    Bicarbonate 20 (L) 21 - 32 mmol/L    Anion Gap 16 10 - 20 mmol/L    Urea Nitrogen 38 (H) 6 - 23 mg/dL    Creatinine 7.15 (H) 0.50 - 1.30 mg/dL    eGFR 9 (L) >60 mL/min/1.73m*2    Calcium 8.0 (L) 8.6 - 10.3 mg/dL   Lavender Top   Result Value Ref Range    Extra Tube Hold for add-ons.         DATA:   Diagnostic tests reviewed for today's visit:    New labs and imaging     Assessment and Plan:  Pt came with diverticulitis, found to have bowel perforation, emergent surgery on 4/24, s/o partial colon resection with diverting end " colostomy  This appeared to be a colon cancer that had eroded into the abdominal wall and was densely adherent to the bladder, per surgery  - HARLEY: baseline Scr 0.6  Due to ATN and vanco toxicity. No eosinophilia and without signs of AIN  Non oliguric, normal kidney US   Scr and BUN still slowly trending up  UA without RBC, PCR 2.3 -likely tubular proteinuria mostly-  BP: controlled  Lytes and acid base: acceptable  Hb 9.1     PLAN:  - keep ivf per surgery, adjust based on I/O  - Supportive care, off vanco. Hopping to see recovery soon    Will continue to follow.     Signature: Felix Garcia MD

## 2024-05-01 NOTE — CARE PLAN
The patient's goals for the shift include  get ng tube out, eat regular food    The clinical goals for the shift include remain HDS    Over the shift, the patient did make progress toward the following goals.

## 2024-05-02 LAB
ANION GAP SERPL CALC-SCNC: 15 MMOL/L (ref 10–20)
BUN SERPL-MCNC: 41 MG/DL (ref 6–23)
CALCIUM SERPL-MCNC: 7.5 MG/DL (ref 8.6–10.3)
CHLORIDE SERPL-SCNC: 103 MMOL/L (ref 98–107)
CO2 SERPL-SCNC: 20 MMOL/L (ref 21–32)
CREAT SERPL-MCNC: 7.55 MG/DL (ref 0.5–1.3)
EGFRCR SERPLBLD CKD-EPI 2021: 9 ML/MIN/1.73M*2
ERYTHROCYTE [DISTWIDTH] IN BLOOD BY AUTOMATED COUNT: 14.1 % (ref 11.5–14.5)
GLUCOSE SERPL-MCNC: 107 MG/DL (ref 74–99)
HCT VFR BLD AUTO: 25.9 % (ref 41–52)
HGB BLD-MCNC: 8.5 G/DL (ref 13.5–17.5)
MCH RBC QN AUTO: 25.4 PG (ref 26–34)
MCHC RBC AUTO-ENTMCNC: 32.8 G/DL (ref 32–36)
MCV RBC AUTO: 77 FL (ref 80–100)
NRBC BLD-RTO: 0 /100 WBCS (ref 0–0)
PLATELET # BLD AUTO: 522 X10*3/UL (ref 150–450)
POTASSIUM SERPL-SCNC: 4.8 MMOL/L (ref 3.5–5.3)
RBC # BLD AUTO: 3.35 X10*6/UL (ref 4.5–5.9)
SODIUM SERPL-SCNC: 133 MMOL/L (ref 136–145)
WBC # BLD AUTO: 12.3 X10*3/UL (ref 4.4–11.3)

## 2024-05-02 PROCEDURE — 2500000001 HC RX 250 WO HCPCS SELF ADMINISTERED DRUGS (ALT 637 FOR MEDICARE OP): Performed by: INTERNAL MEDICINE

## 2024-05-02 PROCEDURE — 80048 BASIC METABOLIC PNL TOTAL CA: CPT | Performed by: INTERNAL MEDICINE

## 2024-05-02 PROCEDURE — 85027 COMPLETE CBC AUTOMATED: CPT | Performed by: INTERNAL MEDICINE

## 2024-05-02 PROCEDURE — 1100000001 HC PRIVATE ROOM DAILY

## 2024-05-02 PROCEDURE — 82374 ASSAY BLOOD CARBON DIOXIDE: CPT | Performed by: INTERNAL MEDICINE

## 2024-05-02 PROCEDURE — 99231 SBSQ HOSP IP/OBS SF/LOW 25: CPT | Performed by: INTERNAL MEDICINE

## 2024-05-02 PROCEDURE — 99233 SBSQ HOSP IP/OBS HIGH 50: CPT | Performed by: INTERNAL MEDICINE

## 2024-05-02 PROCEDURE — 2500000004 HC RX 250 GENERAL PHARMACY W/ HCPCS (ALT 636 FOR OP/ED): Performed by: INTERNAL MEDICINE

## 2024-05-02 PROCEDURE — 36415 COLL VENOUS BLD VENIPUNCTURE: CPT | Performed by: INTERNAL MEDICINE

## 2024-05-02 PROCEDURE — C9113 INJ PANTOPRAZOLE SODIUM, VIA: HCPCS | Performed by: INTERNAL MEDICINE

## 2024-05-02 PROCEDURE — 99232 SBSQ HOSP IP/OBS MODERATE 35: CPT | Performed by: NURSE PRACTITIONER

## 2024-05-02 RX ADMIN — POTASSIUM CHLORIDE, DEXTROSE MONOHYDRATE AND SODIUM CHLORIDE 100 ML/HR: 150; 5; 450 INJECTION, SOLUTION INTRAVENOUS at 07:20

## 2024-05-02 RX ADMIN — DOCUSATE SODIUM 100 MG: 100 CAPSULE, LIQUID FILLED ORAL at 20:57

## 2024-05-02 RX ADMIN — HEPARIN SODIUM 5000 UNITS: 5000 INJECTION INTRAVENOUS; SUBCUTANEOUS at 15:41

## 2024-05-02 RX ADMIN — DOCUSATE SODIUM 100 MG: 100 CAPSULE, LIQUID FILLED ORAL at 10:02

## 2024-05-02 RX ADMIN — HEPARIN SODIUM 5000 UNITS: 5000 INJECTION INTRAVENOUS; SUBCUTANEOUS at 20:58

## 2024-05-02 RX ADMIN — POTASSIUM CHLORIDE, DEXTROSE MONOHYDRATE AND SODIUM CHLORIDE 100 ML/HR: 150; 5; 450 INJECTION, SOLUTION INTRAVENOUS at 20:57

## 2024-05-02 RX ADMIN — PANTOPRAZOLE SODIUM 40 MG: 40 INJECTION, POWDER, FOR SOLUTION INTRAVENOUS at 06:59

## 2024-05-02 RX ADMIN — AMPICILLIN SODIUM AND SULBACTAM SODIUM 3 G: 2; 1 INJECTION, POWDER, FOR SOLUTION INTRAMUSCULAR; INTRAVENOUS at 10:02

## 2024-05-02 RX ADMIN — HEPARIN SODIUM 5000 UNITS: 5000 INJECTION INTRAVENOUS; SUBCUTANEOUS at 06:59

## 2024-05-02 RX ADMIN — METOPROLOL TARTRATE 25 MG: 25 TABLET, FILM COATED ORAL at 20:57

## 2024-05-02 RX ADMIN — METOPROLOL TARTRATE 25 MG: 25 TABLET, FILM COATED ORAL at 10:02

## 2024-05-02 ASSESSMENT — COGNITIVE AND FUNCTIONAL STATUS - GENERAL
MOBILITY SCORE: 22
WALKING IN HOSPITAL ROOM: A LITTLE
DAILY ACTIVITIY SCORE: 24
CLIMB 3 TO 5 STEPS WITH RAILING: A LITTLE
MOBILITY SCORE: 22
DAILY ACTIVITIY SCORE: 24
CLIMB 3 TO 5 STEPS WITH RAILING: A LITTLE
WALKING IN HOSPITAL ROOM: A LITTLE

## 2024-05-02 ASSESSMENT — PAIN SCALES - GENERAL: PAINLEVEL_OUTOF10: 0 - NO PAIN

## 2024-05-02 ASSESSMENT — PAIN - FUNCTIONAL ASSESSMENT: PAIN_FUNCTIONAL_ASSESSMENT: 0-10

## 2024-05-02 NOTE — PROGRESS NOTES
Nephrology Consult Progress Note    Admit Date: 4/24/2024    Interval history:  No SOB, no abdominal pain  NGT out and tolerating po intake     CURRENT MEDICATIONS:    Current Facility-Administered Medications:     acetaminophen (Tylenol) tablet 650 mg, 650 mg, nasogastric tube, q6h, 650 mg at 04/28/24 0926 **OR** acetaminophen (Tylenol) oral liquid 650 mg, 650 mg, nasogastric tube, q6h, 650 mg at 04/29/24 0931 **OR** acetaminophen (Tylenol) suppository 650 mg, 650 mg, rectal, q6h, Blayne Villar PA-C    ampicillin-sulbactam (Unasyn) in sodium chloride 0.9 % 100 mL 3 g, 3 g, intravenous, q24h, Nael Arrington MD, Last Rate: 200 mL/hr at 05/02/24 1002, 3 g at 05/02/24 1002    benzocaine-menthol (Cepastat Sore Throat) lozenge 1 lozenge, 1 lozenge, Mouth/Throat, q2h PRN, Zahira Miller MD, 1 lozenge at 04/27/24 2151    dextrose 5 % and sodium chloride 0.45 % with KCl 20 mEq/L infusion, 100 mL/hr, intravenous, Continuous, Zahira Miller MD, Last Rate: 100 mL/hr at 05/02/24 0720, 100 mL/hr at 05/02/24 0720    diatrizoate iva-diatrizoat sod (Gastrografin 37% organic bound iodine) solution 90 mL, 90 mL, oral, Once, Manuel Sharma PA-C    docusate sodium (Colace) capsule 100 mg, 100 mg, oral, BID, Zahira Miller MD, 100 mg at 05/02/24 1002    heparin (porcine) injection 5,000 Units, 5,000 Units, subcutaneous, q8h LILY, Zahira Miller MD, 5,000 Units at 05/02/24 0659    metoprolol tartrate (Lopressor) tablet 25 mg, 25 mg, oral, BID, Zahira Miller MD, 25 mg at 05/02/24 1002    [Held by provider] morphine injection 2 mg, 2 mg, intravenous, q4h PRN, Zahira Miller MD    naloxone (Narcan) injection 0.2 mg, 0.2 mg, intravenous, PRN, Dionisio Salinas MD    ondansetron (Zofran) tablet 4 mg, 4 mg, oral, q6h PRN **OR** ondansetron (Zofran) injection 4 mg, 4 mg, intravenous, q6h PRN, Blayne Villar PA-C, 4 mg at 04/27/24 2105    pantoprazole (ProtoNix) injection 40 mg, 40 mg, intravenous, Daily before breakfast, Zahira Miller MD, 40 mg at  "05/02/24 0659    phenylephrine (Nathen-Synephrine) 0.25 % nasal spray 1 spray, 1 spray, Each Nostril, Once, Blayne Villar PA-C    polyethylene glycol (Glycolax, Miralax) packet 17 g, 17 g, oral, Daily, Zahira Miller MD, 17 g at 04/28/24 0926       Intake/Output Summary (Last 24 hours) at 5/2/2024 1107  Last data filed at 5/2/2024 0659  Gross per 24 hour   Intake 4258.46 ml   Output 1540 ml   Net 2718.46 ml       PHYSICAL EXAM:  /90 (BP Location: Left arm, Patient Position: Lying)   Pulse 74   Temp 37.1 °C (98.7 °F) (Temporal)   Resp 18   Ht 1.83 m (6' 0.05\")   Wt 71.4 kg (157 lb 6.5 oz)   SpO2 100%   BMI 21.32 kg/m²     Intake/Output Summary (Last 24 hours) at 5/2/2024 1107  Last data filed at 5/2/2024 0659  Gross per 24 hour   Intake 4258.46 ml   Output 1540 ml   Net 2718.46 ml     Gen: AAO, NAD  Neck: No JVD  Cardiac: RRR  Resp: clear BS  Abd: Soft, mildly tender, +BS, non distended   +ostomy  Ext: No edema   Neuro: moves 4 ext  Peripheral Pulses: Capillary refill <2secs, strong peripheral pulses.  Skin: Skin color, texture, turgor normal, no suspicious rashes or lesions.    Labs:  Results for orders placed or performed during the hospital encounter of 04/24/24 (from the past 24 hour(s))   Basic Metabolic Panel   Result Value Ref Range    Glucose 107 (H) 74 - 99 mg/dL    Sodium 133 (L) 136 - 145 mmol/L    Potassium 4.8 3.5 - 5.3 mmol/L    Chloride 103 98 - 107 mmol/L    Bicarbonate 20 (L) 21 - 32 mmol/L    Anion Gap 15 10 - 20 mmol/L    Urea Nitrogen 41 (H) 6 - 23 mg/dL    Creatinine 7.55 (H) 0.50 - 1.30 mg/dL    eGFR 9 (L) >60 mL/min/1.73m*2    Calcium 7.5 (L) 8.6 - 10.3 mg/dL   CBC   Result Value Ref Range    WBC 12.3 (H) 4.4 - 11.3 x10*3/uL    nRBC 0.0 0.0 - 0.0 /100 WBCs    RBC 3.35 (L) 4.50 - 5.90 x10*6/uL    Hemoglobin 8.5 (L) 13.5 - 17.5 g/dL    Hematocrit 25.9 (L) 41.0 - 52.0 %    MCV 77 (L) 80 - 100 fL    MCH 25.4 (L) 26.0 - 34.0 pg    MCHC 32.8 32.0 - 36.0 g/dL    RDW 14.1 11.5 - 14.5 %    " Platelets 522 (H) 150 - 450 x10*3/uL        DATA:   Diagnostic tests reviewed for today's visit:    New labs and imaging     Assessment and Plan:  Pt came with diverticulitis, found to have bowel perforation, emergent surgery on 4/24, s/o partial colon resection with diverting end colostomy  This appeared to be a colon cancer that had eroded into the abdominal wall and was densely adherent to the bladder, per surgery  - HARLEY: baseline Scr 0.6  Due to ATN and vanco toxicity. No eosinophilia and without signs of AIN  Non oliguric, normal kidney US   Scr and BUN still gently trending up, UO 1.7L/day  Non uremic, euvolemic   UA without RBC, PCR 2.3 -likely tubular proteinuria mostly-  BP: controlled  Lytes and acid base: acceptable  Hb 8.5     PLAN:  - Not meeting dialysis initiation criteria yet, will need to keep watching for now, no discharge  - Supportive care, off vanco. Hopping to see recovery soon    Will continue to follow.     Signature: Felix Garcia MD

## 2024-05-02 NOTE — PROGRESS NOTES
"James Ramirez is a 44 y.o. male on day 8 of admission presenting with Bowel perforation (Multi).    Subjective   Feeling a little overwhelmed today, pain is well controlled, tolerating soft diet. 10cc of stool output documented but there is  much more than that in the gravity bag. He denies n/v, fever, chills, CP and SOB.        Objective     Physical Exam  Constitutional:       Appearance: Normal appearance.   Eyes:      Conjunctiva/sclera: Conjunctivae normal.   Cardiovascular:      Rate and Rhythm: Normal rate.      Pulses: Normal pulses.   Pulmonary:      Effort: Pulmonary effort is normal.   Abdominal:      Comments: Moderately distended, soft, appropriately tender, wound vac with SSD in canister, UDSTY's drains SS, colostomy Beefy red, moist, producing brown stool, + gas, well pouched    Genitourinary:     Comments: Voiding without difficulty   Musculoskeletal:         General: Normal range of motion.   Skin:     General: Skin is warm.   Neurological:      Mental Status: He is oriented to person, place, and time.   Psychiatric:         Mood and Affect: Mood normal.         Behavior: Behavior normal.         Last Recorded Vitals  Blood pressure 143/90, pulse 74, temperature 37.1 °C (98.7 °F), temperature source Temporal, resp. rate 18, height 1.83 m (6' 0.05\"), weight 71.4 kg (157 lb 6.5 oz), SpO2 100%.  Intake/Output last 3 Shifts:  I/O last 3 completed shifts:  In: 4258.5 (59.6 mL/kg) [I.V.:5.1 (0.1 mL/kg); IV Piggyback:4253.3]  Out: 2300 (32.2 mL/kg) [Urine:2100 (0.8 mL/kg/hr); Drains:190; Stool:10]  Weight: 71.4 kg     Medications:   Scheduled medications  acetaminophen, 650 mg, nasogastric tube, q6h   Or  acetaminophen, 650 mg, nasogastric tube, q6h   Or  acetaminophen, 650 mg, rectal, q6h  ampicillin-sulbactam, 3 g, intravenous, q24h  diatrizoate iva-diatrizoat sod, 90 mL, oral, Once  docusate sodium, 100 mg, oral, BID  heparin (porcine), 5,000 Units, subcutaneous, q8h LILY  metoprolol tartrate, 25 mg, oral, " BID  pantoprazole, 40 mg, intravenous, Daily before breakfast  phenylephrine, 1 spray, Each Nostril, Once  polyethylene glycol, 17 g, oral, Daily      Continuous medications  potassium udwppqw-F4-9.45%NaCl, 100 mL/hr, Last Rate: 100 mL/hr (05/02/24 0720)      PRN medications  PRN medications: benzocaine-menthol, [Held by provider] morphine, naloxone, ondansetron **OR** ondansetron    Relevant Results  Results for orders placed or performed during the hospital encounter of 04/24/24 (from the past 24 hour(s))   Basic Metabolic Panel   Result Value Ref Range    Glucose 107 (H) 74 - 99 mg/dL    Sodium 133 (L) 136 - 145 mmol/L    Potassium 4.8 3.5 - 5.3 mmol/L    Chloride 103 98 - 107 mmol/L    Bicarbonate 20 (L) 21 - 32 mmol/L    Anion Gap 15 10 - 20 mmol/L    Urea Nitrogen 41 (H) 6 - 23 mg/dL    Creatinine 7.55 (H) 0.50 - 1.30 mg/dL    eGFR 9 (L) >60 mL/min/1.73m*2    Calcium 7.5 (L) 8.6 - 10.3 mg/dL   CBC   Result Value Ref Range    WBC 12.3 (H) 4.4 - 11.3 x10*3/uL    nRBC 0.0 0.0 - 0.0 /100 WBCs    RBC 3.35 (L) 4.50 - 5.90 x10*6/uL    Hemoglobin 8.5 (L) 13.5 - 17.5 g/dL    Hematocrit 25.9 (L) 41.0 - 52.0 %    MCV 77 (L) 80 - 100 fL    MCH 25.4 (L) 26.0 - 34.0 pg    MCHC 32.8 32.0 - 36.0 g/dL    RDW 14.1 11.5 - 14.5 %    Platelets 522 (H) 150 - 450 x10*3/uL       XR chest 1 view    Result Date: 4/26/2024  Interpreted By:  Reji Senior, STUDY: XR CHEST 1 VIEW;  4/26/2024 1:32 pm   INDICATION: Signs/Symptoms:NG placement confirmation.   COMPARISON: CT scan abdomen and pelvis from 04/24/2024. Chest x-ray from 04/24/2024.   ACCESSION NUMBER(S): [id number]   ORDERING CLINICIAN: SUMA CANAS   TECHNIQUE: Single AP portable view of the chest was obtained.   FINDINGS: MEDIASTINUM/ LUNGS/ HAILEY:   Suboptimal level of inspiration. Mild cardiomegaly. No gross vascular congestion or pleural effusion. No mass or pneumonia in either lung. No pneumothorax. No tracheal deviation. No abnormal hilar fullness or gross mass on either  side.   BONES: No lytic or blastic destructive bone lesion.   UPPER ABDOMEN: Distal tip of a newly placed NG tube is in the lateral proximal stomach. There is mild-to-moderate colonic stool and gas across the transverse colon.       Hypoventilatory exam.   Mild cardiomegaly.   NG tube in place as described.   MACRO: None   Signed by: Reji Senior 4/26/2024 1:45 PM Dictation workstation:   [id number]    ECG 12 Lead    Result Date: 4/24/2024  Sinus tachycardia Minimal voltage criteria for LVH, may be normal variant ( Sokolow-Busch ) Borderline ECG No previous ECGs available See ED provider note for full interpretation and clinical correlation Confirmed by Jyoti Melendrez ([id number]) on 4/24/2024 4:32:39 PM    XR chest 1 view    Result Date: 4/24/2024  Interpreted By:  Joleen Pascual, STUDY: Chest, single AP view.   INDICATION: Signs/Symptoms:Pre-op.   COMPARISON: CT of the abdomen and pelvis study dated 04/24/2024.   ACCESSION NUMBER(S): [id number]   ORDERING CLINICIAN: MEHDI BROWN   FINDINGS: The cardiac silhouette size is within normal limits. There is no focal consolidation, edema or pneumothorax. No sizeable pleural effusion. Calcified granuloma noted in the right mid lung. No acute osseous abnormality.       1. No acute cardiopulmonary process.   MACRO: None.   Signed by: Joleen Pascual 4/24/2024 11:59 AM Dictation workstation:   [id number]    CT abdomen pelvis w IV contrast    Result Date: 4/24/2024  Interpreted By:  Julianne Meneses, STUDY: CT ABDOMEN PELVIS W IV CONTRAST;  4/24/2024 11:00 am   INDICATION: Signs/Symptoms:lower abdominal pain and swelling eval for possible incarcerated hernia.   COMPARISON: None.   ACCESSION NUMBER(S): [id number]   ORDERING CLINICIAN: AILYN PANG   TECHNIQUE: CT of the abdomen and pelvis was performed.  85 mL Omnipaque 350   FINDINGS: LOWER CHEST: Images of the lung bases show no infiltrate or pleural fluid.   ABDOMEN:   LIVER: There is no hepatic mass.   BILE  DUCTS: There is no intrahepatic, common hepatic or common bile ductal dilatation.   GALLBLADDER: The gallbladder is unremarkable.   PANCREAS: The pancreas is unremarkable.   SPLEEN: The spleen is unremarkable. There is no splenic mass or splenomegaly.   ADRENAL GLANDS: The adrenal glands are unremarkable.   KIDNEYS AND URETERS: The kidneys function symmetrically. The kidneys demonstrate no mass. There is no intrarenal calculus or hydronephrosis.     VESSELS: The abdominal and pelvic vessels are unremarkable.   PERITONEUM/RETROPERITONEUM/LYMPH NODES: There is no retroperitoneal or pelvic adenopathy.  There is no ascites.   ABDOMINAL WALL/BOWEL: There is thickening of the right and left rectus abdominis muscles. There is an abscess containing fluid and air in the anterior abdominal wall the in the midline between the rectus abdominis muscles. This measures 9.0 x 2.7 x 3.6 cm in longitudinal, transverse and AP dimensions respectively. There are multiple smaller dots of air in the subcutaneous fat. There is an abscess with an air-fluid level centrally in the pelvis measuring 9.1 x 7.9 x 3.8 cm in longitudinal, transverse and AP dimensions respectively. There are multiple dots of air in the peritoneal cavity around this larger abscess. Findings are consistent with a viscus perforation, likely extending into the anterior abdominal wall. There is a 2.0 x 0.8 cm abscess in the left side of the pelvis with smaller dots of air. There is a thickened loop of sigmoid colon and this is likely perforated sigmoid diverticulitis.   BONE AND SOFT TISSUE: There is no acute osseous finding.   There is no soft tissue abnormality.       1. Central intraabdominal abscess measuring 9.1 x 7.9 x 3.8 cm.. There are multiple dots of air around this larger abscess consistent with localized perforation and viscus perforation. This is likely perforated sigmoid diverticulitis. There are smaller abscesses in the abdomen and pelvis.   2. This  "abscess extends into the anterior abdominal wall between the rectus abdominis muscles.   MACRO: Julianne Meneses discussed the significance and urgency of this critical finding by secure chat with Dionisio Salinas and AILYN PANG on 4/24/2024 at 11:40 am.  (**-RCF-**) Findings:  See findings.   Signed by: Julianne Meneses 4/24/2024 11:41 AM Dictation workstation:   [id number]       Assessment/Plan   Principal Problem:    Bowel perforation (Multi)  Active Problems:    Intra-abdominal abscess (Multi)    Diverticulitis    Acute kidney injury (CMS-HCC)    Primary hypertension    James Ramirez is a 45 yo male who is admitted with bowel perforation and is POD #8 s/p ex lap, drainage of abdominal wall abscess, partial colon resection with diverting end colostomy with Dr Salinas. Intra-operative findings showed \" This appeared to be a colon cancer that had eroded into the abdominal wall and was densely adherent to the bladder.  Damage control operation was performed with washout placement of drains and diverting end colostomy.\" On exam, abdomen is soft minimally distended,  both DUSTY drains are SS, wound vac is functioning appropriately with SSD in canister. Colostomy Beefy red, moist, producing brown liquid stool, + gas, well pouched.    Plan:   POD #8 s/p ex lap, partial colon resection and end colostomy post-op course has been complicated by ileus and HARLEY   - continue soft diet  - Jaron when tolerating PO   - continue wound VAC  - LLQ DUSTY drain removed 5/2 without difficulty   - continue remaining DUSTY drain   - strict I&O  - enterostomal RN and dietician following       Renal:   HARLEY  - trend Cr 7.55 from 7.15  - accurate I&O   - avoid nephrotoxic medications  - BP stable   - lytes normal   - nephrology following     ID: tmax 38.2  Bowel perforation   - ID recs: continue unasyn and DC with augmentin 500 every day through 5/7.   - trend WBC 12.3    Heme:   Anemia  - H/H 8.3/25.9- stable   - no s/s of bleeding    Dispo: surgery will " continue to follow, should start DC planning. Discussed with Dr Salinas     I spent 35 minutes in the professional and overall care of this patient.      Rebecca Palacio, APRN-CNP

## 2024-05-02 NOTE — PROGRESS NOTES
"James Ramirez is a 44 y.o. male on day 7 of admission presenting with Bowel perforation (Multi).    Subjective   Patient had elevated blood pressure reading earlier.  The NG tube was causing a lot of discomfort.  Now NG tube is out he is feeling more comfortable       Objective     Physical Exam  Vitals reviewed.   Constitutional:       Appearance: Normal appearance.   HENT:      Head: Normocephalic and atraumatic.      Right Ear: Tympanic membrane, ear canal and external ear normal.      Left Ear: Tympanic membrane, ear canal and external ear normal.      Nose: Nose normal.      Mouth/Throat:      Pharynx: Oropharynx is clear.   Eyes:      Extraocular Movements: Extraocular movements intact.      Conjunctiva/sclera: Conjunctivae normal.      Pupils: Pupils are equal, round, and reactive to light.   Cardiovascular:      Rate and Rhythm: Normal rate and regular rhythm.      Pulses: Normal pulses.      Heart sounds: Normal heart sounds.   Pulmonary:      Effort: Pulmonary effort is normal.      Breath sounds: Normal breath sounds.   Abdominal:      General: Abdomen is flat. Bowel sounds are normal.      Palpations: Abdomen is soft.      Tenderness: There is abdominal tenderness.   Musculoskeletal:      Cervical back: Normal range of motion and neck supple.   Skin:     General: Skin is warm and dry.   Neurological:      General: No focal deficit present.      Mental Status: He is alert and oriented to person, place, and time.   Psychiatric:         Mood and Affect: Mood normal.         Last Recorded Vitals  Blood pressure 148/89, pulse 77, temperature 36.3 °C (97.4 °F), temperature source Oral, resp. rate 17, height 1.83 m (6' 0.05\"), weight 71.4 kg (157 lb 6.5 oz), SpO2 91%.  Intake/Output last 3 Shifts:  I/O last 3 completed shifts:  In: 3968.3 (55.6 mL/kg) [IV Piggyback:3968.3]  Out: 2910 (40.8 mL/kg) [Urine:1350 (0.5 mL/kg/hr); Emesis/NG output:1200; Drains:160; Stool:200]  Weight: 71.4 kg     Relevant " Results              Scheduled medications  acetaminophen, 650 mg, nasogastric tube, q6h   Or  acetaminophen, 650 mg, nasogastric tube, q6h   Or  acetaminophen, 650 mg, rectal, q6h  ampicillin-sulbactam, 3 g, intravenous, q24h  diatrizoate iva-diatrizoat sod, 90 mL, oral, Once  docusate sodium, 100 mg, oral, BID  heparin (porcine), 5,000 Units, subcutaneous, q8h LILY  metoprolol tartrate, 25 mg, oral, BID  pantoprazole, 40 mg, intravenous, Daily before breakfast  phenylephrine, 1 spray, Each Nostril, Once  polyethylene glycol, 17 g, oral, Daily      Continuous medications  potassium svbkvzt-C9-0.45%NaCl, 100 mL/hr, Last Rate: 100 mL/hr (05/01/24 2120)      PRN medications  PRN medications: benzocaine-menthol, [Held by provider] morphine, naloxone, ondansetron **OR** ondansetron  Results for orders placed or performed during the hospital encounter of 04/24/24 (from the past 24 hour(s))   Basic Metabolic Panel   Result Value Ref Range    Glucose 109 (H) 74 - 99 mg/dL    Sodium 133 (L) 136 - 145 mmol/L    Potassium 4.4 3.5 - 5.3 mmol/L    Chloride 101 98 - 107 mmol/L    Bicarbonate 20 (L) 21 - 32 mmol/L    Anion Gap 16 10 - 20 mmol/L    Urea Nitrogen 38 (H) 6 - 23 mg/dL    Creatinine 7.15 (H) 0.50 - 1.30 mg/dL    eGFR 9 (L) >60 mL/min/1.73m*2    Calcium 8.0 (L) 8.6 - 10.3 mg/dL   Lavender Top   Result Value Ref Range    Extra Tube Hold for add-ons.      XR chest abdomen for OG NG placement    Result Date: 5/1/2024  Interpreted By:  Randolph Gtz, STUDY: XR CHEST ABDOMEN FOR OG NG PLACEMENT;  5/1/2024 12:36 am   INDICATION: Signs/Symptoms:please verify NGT placement, may be dislodged. thank you.   COMPARISON: None.   ACCESSION NUMBER(S): [id number]   ORDERING CLINICIAN: BRANDON MOLINA   TECHNIQUE: Abdomen supine and upright views   Chest PA view   FINDINGS: Enteric tube terminates in the left upper quadrant with side hole below the GE junction.   ABDOMEN: The bowel gas pattern is nonobstructed with gas and stool seen to  level of the rectum. Nondilated gaseous distended loops of bowel in the upper abdomen may relate to ileus. No pneumatosis or portal venous gas. Limited evaluation for free air. Cutaneous staples overlie the mid abdomen.   No suspicious abdominal or pelvic calcification.   Osseous structures demonstrate no acute abnormality.   CHEST: Cardiomediastinal silhouette in normal in size and configuration.   The lung apices are excluded from the field of view limiting evaluation. No pneumothorax, pulmonary consolidation or pleural effusion.   No acute osseous abnormality.       1.  Enteric tube terminates in the left upper quadrant with side hole below the GE junction. Gaseous distended loops of bowel in the visualized upper abdomen may relate to ileus. Attention on follow-up is advised. 2.  No acute cardiopulmonary process.   MACRO: None   Signed by: Randolph Gtz 5/1/2024 1:26 AM Dictation workstation:   [id number]    XR abdomen 1 view    Result Date: 4/30/2024  Interpreted By:  Armida Helm, STUDY: XR ABDOMEN 1 VIEW;  4/30/2024 10:26 am   INDICATION: Signs/Symptoms:Gastrograffin challenge. Please coordinate with nursing staff.   COMPARISON: 04/29/2024   ACCESSION NUMBER(S): [id number]   ORDERING CLINICIAN: FRANCINE CH   FINDINGS: 2 AP portable supine views of the abdomen obtained.   Midline lower pelvis inadvertently not fully included. Dense contrast material in NG tube, stomach and nondilated loops of bowel administered by unknown person at unknown time. Tip of NG tube overlies the proximal stomach. Midline skin clips and 2 surgical drains overlying the pelvis again seen. Small lucent air collections overlying the right upper quadrant are less conspicuous. Included extreme lung bases are clear.       Positive contrast material in nondilated loops of bowel throughout the abdomen.   MACRO: None.   Signed by: Armida Helm 4/30/2024 11:02 AM Dictation workstation:   [id number]                  Assessment/Plan    Principal Problem:    Bowel perforation (Multi)  Active Problems:    Intra-abdominal abscess (Multi)    Diverticulitis    Acute kidney injury (CMS-HCC)    Primary hypertension    Toprol-XL started for elevated blood pressure  Acute kidney injury due to sepsis and Vanco toxicity  Creatinine is getting worse  Nephrology following the patient  IV fluids per GI surgeon  Antibiotics switched to Unasyn  Continue pain medication  Supportive care  Home care ordered for discharge       I spent  minutes in the professional and overall care of this patient.      Zahira Miller MD

## 2024-05-02 NOTE — PROGRESS NOTES
"  INFECTIOUS DISEASE DAILY PROGRESS NOTE    SUBJECTIVE:    No new issues. Afebrile. WBC is 12.3. NG tube out, tolerating diet, return of bowel function present.    OBJECTIVE:  VITALS (Last 24 Hours)  /90 (BP Location: Left arm, Patient Position: Lying)   Pulse 74   Temp 37.1 °C (98.7 °F) (Temporal)   Resp 18   Ht 1.83 m (6' 0.05\")   Wt 71.4 kg (157 lb 6.5 oz)   SpO2 100%   BMI 21.32 kg/m²     PHYSICAL EXAM:  Gen - NAD  ENT - NG tube since removed  Abd - ostomy present, incision with staples  Skin - no rash    ABX: IV Unasyn    LABS:  Lab Results   Component Value Date    WBC 12.3 (H) 05/02/2024    HGB 8.5 (L) 05/02/2024    HCT 25.9 (L) 05/02/2024    MCV 77 (L) 05/02/2024     (H) 05/02/2024     Lab Results   Component Value Date    GLUCOSE 107 (H) 05/02/2024    CALCIUM 7.5 (L) 05/02/2024     (L) 05/02/2024    K 4.8 05/02/2024    CO2 20 (L) 05/02/2024     05/02/2024    BUN 41 (H) 05/02/2024    CREATININE 7.55 (H) 05/02/2024     Results from last 72 hours   Lab Units 04/29/24  1435   ALBUMIN g/dL 2.3*     Estimated Creatinine Clearance: 12.9 mL/min (A) (by C-G formula based on SCr of 7.55 mg/dL (H)).    ASSESSMENT/PLAN:    Complicated Diverticulitis with Perforation - s/p OR partial colectomy on 4/24, washout  Intra-Abd Abscess due to mixed gram positive/gram negative bacteria  Acute on Chronic Renal Failure - CrCl 12.9 affects abx dosing    IV Unasyn 3g Q24H - renally dosed. Can be on PO Augmentin 500mg Q24H on discharge through 5/7/24. This may need adjusting if renal function improves.    Monitoring for adverse effects of abx such as rash/itching/diarrhea - none so far.    Will sign off. Please call back with questions. Thanks!    Nael Arrington MD  [facility]  Office #[phone]      "

## 2024-05-03 LAB
ANION GAP SERPL CALC-SCNC: 13 MMOL/L (ref 10–20)
BUN SERPL-MCNC: 40 MG/DL (ref 6–23)
CALCIUM SERPL-MCNC: 7.5 MG/DL (ref 8.6–10.3)
CHLORIDE SERPL-SCNC: 104 MMOL/L (ref 98–107)
CO2 SERPL-SCNC: 21 MMOL/L (ref 21–32)
CREAT SERPL-MCNC: 7.35 MG/DL (ref 0.5–1.3)
EGFRCR SERPLBLD CKD-EPI 2021: 9 ML/MIN/1.73M*2
GLUCOSE SERPL-MCNC: 101 MG/DL (ref 74–99)
HOLD SPECIMEN: NORMAL
POTASSIUM SERPL-SCNC: 5.1 MMOL/L (ref 3.5–5.3)
SODIUM SERPL-SCNC: 133 MMOL/L (ref 136–145)

## 2024-05-03 PROCEDURE — 36415 COLL VENOUS BLD VENIPUNCTURE: CPT | Performed by: INTERNAL MEDICINE

## 2024-05-03 PROCEDURE — 2500000001 HC RX 250 WO HCPCS SELF ADMINISTERED DRUGS (ALT 637 FOR MEDICARE OP): Performed by: INTERNAL MEDICINE

## 2024-05-03 PROCEDURE — 2500000004 HC RX 250 GENERAL PHARMACY W/ HCPCS (ALT 636 FOR OP/ED): Performed by: INTERNAL MEDICINE

## 2024-05-03 PROCEDURE — 99232 SBSQ HOSP IP/OBS MODERATE 35: CPT | Performed by: INTERNAL MEDICINE

## 2024-05-03 PROCEDURE — 80048 BASIC METABOLIC PNL TOTAL CA: CPT | Performed by: INTERNAL MEDICINE

## 2024-05-03 PROCEDURE — C9113 INJ PANTOPRAZOLE SODIUM, VIA: HCPCS | Performed by: INTERNAL MEDICINE

## 2024-05-03 PROCEDURE — 99233 SBSQ HOSP IP/OBS HIGH 50: CPT | Performed by: INTERNAL MEDICINE

## 2024-05-03 PROCEDURE — 99231 SBSQ HOSP IP/OBS SF/LOW 25: CPT | Performed by: NURSE PRACTITIONER

## 2024-05-03 PROCEDURE — 1100000001 HC PRIVATE ROOM DAILY

## 2024-05-03 RX ORDER — SODIUM CHLORIDE, SODIUM LACTATE, POTASSIUM CHLORIDE, CALCIUM CHLORIDE 600; 310; 30; 20 MG/100ML; MG/100ML; MG/100ML; MG/100ML
100 INJECTION, SOLUTION INTRAVENOUS CONTINUOUS
Status: DISCONTINUED | OUTPATIENT
Start: 2024-05-03 | End: 2024-05-09 | Stop reason: HOSPADM

## 2024-05-03 RX ORDER — AMLODIPINE BESYLATE 5 MG/1
5 TABLET ORAL DAILY
Status: DISCONTINUED | OUTPATIENT
Start: 2024-05-03 | End: 2024-05-04

## 2024-05-03 RX ADMIN — HEPARIN SODIUM 5000 UNITS: 5000 INJECTION INTRAVENOUS; SUBCUTANEOUS at 07:02

## 2024-05-03 RX ADMIN — HEPARIN SODIUM 5000 UNITS: 5000 INJECTION INTRAVENOUS; SUBCUTANEOUS at 21:12

## 2024-05-03 RX ADMIN — SODIUM CHLORIDE, POTASSIUM CHLORIDE, SODIUM LACTATE AND CALCIUM CHLORIDE 100 ML/HR: 600; 310; 30; 20 INJECTION, SOLUTION INTRAVENOUS at 21:11

## 2024-05-03 RX ADMIN — METOPROLOL TARTRATE 25 MG: 25 TABLET, FILM COATED ORAL at 09:16

## 2024-05-03 RX ADMIN — PANTOPRAZOLE SODIUM 40 MG: 40 INJECTION, POWDER, FOR SOLUTION INTRAVENOUS at 07:02

## 2024-05-03 RX ADMIN — SODIUM CHLORIDE, POTASSIUM CHLORIDE, SODIUM LACTATE AND CALCIUM CHLORIDE 100 ML/HR: 600; 310; 30; 20 INJECTION, SOLUTION INTRAVENOUS at 09:17

## 2024-05-03 RX ADMIN — AMPICILLIN SODIUM AND SULBACTAM SODIUM 3 G: 2; 1 INJECTION, POWDER, FOR SOLUTION INTRAMUSCULAR; INTRAVENOUS at 09:17

## 2024-05-03 RX ADMIN — HEPARIN SODIUM 5000 UNITS: 5000 INJECTION INTRAVENOUS; SUBCUTANEOUS at 13:46

## 2024-05-03 RX ADMIN — ACETAMINOPHEN 650 MG: 325 TABLET ORAL at 10:16

## 2024-05-03 RX ADMIN — DOCUSATE SODIUM 100 MG: 100 CAPSULE, LIQUID FILLED ORAL at 09:16

## 2024-05-03 ASSESSMENT — COGNITIVE AND FUNCTIONAL STATUS - GENERAL
MOBILITY SCORE: 24
DAILY ACTIVITIY SCORE: 24
MOBILITY SCORE: 24

## 2024-05-03 ASSESSMENT — PAIN - FUNCTIONAL ASSESSMENT: PAIN_FUNCTIONAL_ASSESSMENT: 0-10

## 2024-05-03 ASSESSMENT — PAIN SCALES - GENERAL
PAINLEVEL_OUTOF10: 0 - NO PAIN
PAINLEVEL_OUTOF10: 0 - NO PAIN

## 2024-05-03 ASSESSMENT — PAIN SCALES - WONG BAKER: WONGBAKER_NUMERICALRESPONSE: NO HURT

## 2024-05-03 NOTE — CONSULTS
Ostomy Wound Care Consult     Visit Date: 5/3/2024      Patient Name: James Ramirez         MRN: [id number]           YOB: 1980     WOC nursing visit outcome: Patient observed ostomy care and pouch change      WOC next scheduled visit/plan: WOC will follow while inpatient     Stoma Type: Colostomy  Del: No  Diameter: 1 1/2  Location: LUQ  Protrusion: Budded  Mucosal Condition and Color: Moist, Red, Edematous  Mucocutaneous Junction: Intact  Peristomal Skin: Clear, intact  Location of Skin Impairment: n/a  Peristomal Contour: Flat  Supportive Tissue: Soft  Character of Output: Brown and Formed Stool  Emptying Frequency: as needed   Removed/Current Pouching System: removed RED 2 piece charlotte with high output pouch. Apply barrier ring, 2 piece soft convex RED charlotte with lock and roll pouch   Current Wearing Time: 3 Days    Recommendations:   Skin Care: apply stomahesive powder as needed     Wendi Matos RN BSN,[facility],CWOCN  [phone]   5/3/2024  4:21 PM

## 2024-05-03 NOTE — PROGRESS NOTES
05/03/24 1053   Discharge Planning   Patient expects to be discharged to: Home with outpt wound and ostomy appts     mother at bedside and patient allowed this TCC to update her on the dc plan, explained patient will have many follow up appts for wound care and separate visits for ostomy care, he seemed to be more receptive to all this and seemed to be looking forward to dc soon, aware that renal is following still.    ADOD 2-4 days  BARRIERS renal function, wound care, ostomy care  DISPO home with father    1500- wound clinic appt made at patients preferred location ([place]) for first avail 5/9/24 @ 2p, on AVS and patient updated.

## 2024-05-03 NOTE — PROGRESS NOTES
Nephrology Consult Progress Note    Admit Date: 4/24/2024    Interval history:  No SOB, no abdominal pain  tolerating po intake but still volume intake, on ivf    CURRENT MEDICATIONS:    Current Facility-Administered Medications:     acetaminophen (Tylenol) tablet 650 mg, 650 mg, nasogastric tube, q6h, 650 mg at 05/03/24 1016 **OR** acetaminophen (Tylenol) oral liquid 650 mg, 650 mg, nasogastric tube, q6h, 650 mg at 04/29/24 0931 **OR** acetaminophen (Tylenol) suppository 650 mg, 650 mg, rectal, q6h, Blayne Villar PA-C    ampicillin-sulbactam (Unasyn) in sodium chloride 0.9 % 100 mL 3 g, 3 g, intravenous, q24h, Nael Arrington MD, Stopped at 05/03/24 0947    benzocaine-menthol (Cepastat Sore Throat) lozenge 1 lozenge, 1 lozenge, Mouth/Throat, q2h PRN, Zahira Miller MD, 1 lozenge at 04/27/24 2151    diatrizoate iva-diatrizoat sod (Gastrografin 37% organic bound iodine) solution 90 mL, 90 mL, oral, Once, Manuel Sharma PA-C    docusate sodium (Colace) capsule 100 mg, 100 mg, oral, BID, Zahira Miller MD, 100 mg at 05/03/24 0916    heparin (porcine) injection 5,000 Units, 5,000 Units, subcutaneous, q8h LILY, Zahira Miller MD, 5,000 Units at 05/03/24 0702    lactated Ringer's infusion, 100 mL/hr, intravenous, Continuous, Felix Garcia MD, Last Rate: 100 mL/hr at 05/03/24 0917, 100 mL/hr at 05/03/24 0917    metoprolol tartrate (Lopressor) tablet 25 mg, 25 mg, oral, BID, Zahira Miller MD, 25 mg at 05/03/24 0916    [Held by provider] morphine injection 2 mg, 2 mg, intravenous, q4h PRN, Zahira Miller MD    naloxone (Narcan) injection 0.2 mg, 0.2 mg, intravenous, PRN, Dionisio Salinas MD    ondansetron (Zofran) tablet 4 mg, 4 mg, oral, q6h PRN **OR** ondansetron (Zofran) injection 4 mg, 4 mg, intravenous, q6h PRN, Blayne Villar PA-C, 4 mg at 04/27/24 2105    pantoprazole (ProtoNix) injection 40 mg, 40 mg, intravenous, Daily before breakfast, Zahira Miller MD, 40 mg at 05/03/24 0702    phenylephrine (Nathen-Synephrine) 0.25 % nasal  "spray 1 spray, 1 spray, Each Nostril, Once, Blayne Villar PA-C    polyethylene glycol (Glycolax, Miralax) packet 17 g, 17 g, oral, Daily, Zahira Miller MD, 17 g at 04/28/24 0926       Intake/Output Summary (Last 24 hours) at 5/3/2024 1218  Last data filed at 5/3/2024 0736  Gross per 24 hour   Intake 2328.33 ml   Output 2745 ml   Net -416.67 ml       PHYSICAL EXAM:  BP (!) 154/98 (BP Location: Right arm, Patient Position: Lying)   Pulse 74   Temp 36.7 °C (98 °F) (Temporal)   Resp 18   Ht 1.83 m (6' 0.05\")   Wt 74.3 kg (163 lb 14.4 oz)   SpO2 100%   BMI 22.20 kg/m²     Intake/Output Summary (Last 24 hours) at 5/3/2024 1218  Last data filed at 5/3/2024 0736  Gross per 24 hour   Intake 2328.33 ml   Output 2745 ml   Net -416.67 ml     Gen: AAO, NAD  Neck: No JVD  Cardiac: RRR  Resp: clear BS  Abd: Soft, mildly tender, +BS, non distended   +ostomy  Ext: No edema   Neuro: moves 4 ext  Peripheral Pulses: Capillary refill <2secs, strong peripheral pulses.  Skin: Skin color, texture, turgor normal, no suspicious rashes or lesions.    Labs:  Results for orders placed or performed during the hospital encounter of 04/24/24 (from the past 24 hour(s))   Basic Metabolic Panel   Result Value Ref Range    Glucose 101 (H) 74 - 99 mg/dL    Sodium 133 (L) 136 - 145 mmol/L    Potassium 5.1 3.5 - 5.3 mmol/L    Chloride 104 98 - 107 mmol/L    Bicarbonate 21 21 - 32 mmol/L    Anion Gap 13 10 - 20 mmol/L    Urea Nitrogen 40 (H) 6 - 23 mg/dL    Creatinine 7.35 (H) 0.50 - 1.30 mg/dL    eGFR 9 (L) >60 mL/min/1.73m*2    Calcium 7.5 (L) 8.6 - 10.3 mg/dL   Lavender Top   Result Value Ref Range    Extra Tube Hold for add-ons.         DATA:   Diagnostic tests reviewed for today's visit:    New labs and imaging     Assessment and Plan:  Pt came with diverticulitis, found to have bowel perforation, emergent surgery on 4/24, s/o partial colon resection with diverting end colostomy  This appeared to be a colon cancer that had eroded into the " abdominal wall and was densely adherent to the bladder, per surgery  - HARLEY: baseline Scr 0.6  Due to ATN and vanco toxicity. No eosinophilia and without signs of AIN  Non oliguric, normal kidney US   Scr and BUN finally seems to be starting to trend down, UO 1.9L/day  Non uremic, euvolemic   UA without RBC, PCR 2.3 -likely tubular proteinuria mostly-  BP: suboptimal control  Lytes and acid base: acceptable but rising K, on ivf with KCL  Hb 8.5     PLAN:  - changing ivf to LR, still watching labs, no discharge yet  - Supportive care, off vanco.     Will continue to follow.     Signature: Felix Garcia MD

## 2024-05-03 NOTE — PROGRESS NOTES
"James Ramirez is a 44 y.o. male on day 9 of admission presenting with Bowel perforation (Multi).    Subjective   Patient tolerating p.o. diet.  Walking.  Feeling better       Objective     Physical Exam  Vitals reviewed.   Constitutional:       Appearance: Normal appearance.   HENT:      Head: Normocephalic and atraumatic.      Right Ear: Tympanic membrane, ear canal and external ear normal.      Left Ear: Tympanic membrane, ear canal and external ear normal.      Nose: Nose normal.      Mouth/Throat:      Pharynx: Oropharynx is clear.   Eyes:      Extraocular Movements: Extraocular movements intact.      Conjunctiva/sclera: Conjunctivae normal.      Pupils: Pupils are equal, round, and reactive to light.   Cardiovascular:      Rate and Rhythm: Normal rate and regular rhythm.      Pulses: Normal pulses.      Heart sounds: Normal heart sounds.   Pulmonary:      Effort: Pulmonary effort is normal.      Breath sounds: Normal breath sounds.   Abdominal:      General: Abdomen is flat. Bowel sounds are normal.      Palpations: Abdomen is soft.      Comments: Ileostomy pouch   Musculoskeletal:      Cervical back: Normal range of motion and neck supple.   Skin:     General: Skin is warm and dry.   Neurological:      General: No focal deficit present.      Mental Status: He is alert and oriented to person, place, and time.   Psychiatric:         Mood and Affect: Mood normal.         Last Recorded Vitals  Blood pressure (!) 155/91, pulse 66, temperature 37 °C (98.6 °F), temperature source Temporal, resp. rate 18, height 1.83 m (6' 0.05\"), weight 74.3 kg (163 lb 14.4 oz), SpO2 100%.  Intake/Output last 3 Shifts:  I/O last 3 completed shifts:  In: 2328.3 (31.3 mL/kg) [P.O.:120; IV Piggyback:2208.3]  Out: 3145 (42.3 mL/kg) [Urine:3025 (1.1 mL/kg/hr); Drains:20; Stool:100]  Weight: 74.3 kg     Relevant Results              Scheduled medications  acetaminophen, 650 mg, nasogastric tube, q6h   Or  acetaminophen, 650 mg, nasogastric " tube, q6h   Or  acetaminophen, 650 mg, rectal, q6h  amLODIPine, 5 mg, oral, Daily  ampicillin-sulbactam, 3 g, intravenous, q24h  diatrizoate iva-diatrizoat sod, 90 mL, oral, Once  docusate sodium, 100 mg, oral, BID  heparin (porcine), 5,000 Units, subcutaneous, q8h LILY  metoprolol tartrate, 25 mg, oral, BID  pantoprazole, 40 mg, intravenous, Daily before breakfast  phenylephrine, 1 spray, Each Nostril, Once  polyethylene glycol, 17 g, oral, Daily      Continuous medications  lactated Ringer's, 100 mL/hr, Last Rate: 100 mL/hr (05/03/24 0917)      PRN medications  PRN medications: benzocaine-menthol, [Held by provider] morphine, naloxone, ondansetron **OR** ondansetron  Results for orders placed or performed during the hospital encounter of 04/24/24 (from the past 24 hour(s))   Basic Metabolic Panel   Result Value Ref Range    Glucose 101 (H) 74 - 99 mg/dL    Sodium 133 (L) 136 - 145 mmol/L    Potassium 5.1 3.5 - 5.3 mmol/L    Chloride 104 98 - 107 mmol/L    Bicarbonate 21 21 - 32 mmol/L    Anion Gap 13 10 - 20 mmol/L    Urea Nitrogen 40 (H) 6 - 23 mg/dL    Creatinine 7.35 (H) 0.50 - 1.30 mg/dL    eGFR 9 (L) >60 mL/min/1.73m*2    Calcium 7.5 (L) 8.6 - 10.3 mg/dL   Lavender Top   Result Value Ref Range    Extra Tube Hold for add-ons.      No results found.                Assessment/Plan   Principal Problem:    Bowel perforation (Multi)  Active Problems:    Intra-abdominal abscess (Multi)    Diverticulitis    Acute kidney injury (CMS-HCC)    Primary hypertension    Kidney function starting to improve  Having bowel movements  Tolerating oral intake   IV Unasyn plan to switch to p.o. Augmentin per ID at discharge  Continue wound care  Continue ostomy care  Discharge pending kidney function improvement       I spent  minutes in the professional and overall care of this patient.      Zahira Miller MD

## 2024-05-03 NOTE — CONSULTS
Wound Care Consult     Visit Date: 5/3/2024      Patient Name: James Ramirez         MRN: [id number]           YOB: 1980     Reason for Consult: NPWT dressing change.            Pertinent Labs:   Albumin   Date Value Ref Range Status   04/29/2024 2.3 (L) 3.4 - 5.0 g/dL Final       Wound Assessment:  Wound 04/24/24 Incision Abdomen Lower;Medial (Active)   Wound Image   05/03/24 1251   Site Assessment Unable to assess 04/28/24 2156   [place]-Wound Assessment Dry;Clean;Intact 05/03/24 0933   Shape Linear 04/26/24 1040   Wound Length (cm) 6.9 cm 05/03/24 1251   Wound Width (cm) 3 cm 05/03/24 1251   Wound Surface Area (cm^2) 20.7 cm^2 05/03/24 1251   Wound Depth (cm) 1.5 cm 05/03/24 1251   Wound Volume (cm^3) 31.05 cm^3 05/03/24 1251   Wound Healing % 51 05/03/24 1251   Closure Staples 05/03/24 0933   Sutures/Staple Line Approximated 05/03/24 0933   Drainage Description Sanguineous 05/03/24 0933   Drainage Amount Small 05/03/24 0933   Dressing Vacuum dressing 05/03/24 0933   Dressing Status Clean;Dry;Occlusive 05/03/24 0933       Wound Team Summary Assessment: Wound Vac applied to midline  (1)  xeroform  (1)  black foam   (small)Granufoam       Pressure; 75 mmHg    Plan:  Change wound Vac two times a week and as needed     Consider medicating the patient 30 - 60 minutes prior to dressing change. If there is susanna blood in the tubing (active bleeding), stop the suction and call physician. If the suction is off for greater than 2 hours, remove foam dressing and replace with moist saline dressing. Change canister every week or when full. Remove foam dressing and replace with saline dressing for transfer or discharge.    If patient is discharged prior to next dressing change, please disconnect VAC machine, remove foam dressing apply moist saline dressing. Then place machine in the front soiled utility room on the unit.       Wound Team Plan: WOC will follow while inpatient     While in bed patient should only be on  one fitted sheet, and one chux. Please, do not use brief while patient is resting in bed. Elevate heels off the bed surface at all times. Turn and reposition at least every 2 hours.     Thank you for this consultations, while inpatient please contact with any questions or changes in condition.       Wendi Matos RN BSN,[facility],CWOCN  [phone]   5/3/2024  4:20 PM

## 2024-05-03 NOTE — PROGRESS NOTES
"James Ramirez is a 44 y.o. male on day 9 of admission presenting with Bowel perforation (Multi).    Subjective   Better today, pain is well controlled, tolerating soft diet. 100cc of stool output documented but there is  much more than that in the gravity bag. He denies n/v, fever, chills, CP and SOB.        Objective     Physical Exam  Constitutional:       Appearance: Normal appearance.   Eyes:      Conjunctiva/sclera: Conjunctivae normal.   Cardiovascular:      Rate and Rhythm: Normal rate.      Pulses: Normal pulses.   Pulmonary:      Effort: Pulmonary effort is normal.   Abdominal:      Comments: Mildly distended, soft, appropriately tender, wound vac with SSD in canister, DUSTY drain SS, colostomy Beefy red, moist, producing brown stool, + gas, well pouched     DUSTY drain removed easily, pt tolerated well.    Genitourinary:     Comments: Voiding without difficulty   Musculoskeletal:         General: Normal range of motion.   Skin:     General: Skin is warm.   Neurological:      Mental Status: He is oriented to person, place, and time.   Psychiatric:         Mood and Affect: Mood normal.         Behavior: Behavior normal.         Last Recorded Vitals  Blood pressure (!) 154/98, pulse 74, temperature 36.7 °C (98 °F), temperature source Temporal, resp. rate 18, height 1.83 m (6' 0.05\"), weight 74.3 kg (163 lb 14.4 oz), SpO2 100%.  Intake/Output last 3 Shifts:  I/O last 3 completed shifts:  In: 3716.8 (50 mL/kg) [P.O.:120; I.V.:5.1 (0.1 mL/kg); IV Piggyback:3591.7]  Out: 3315 (44.6 mL/kg) [Urine:3175 (1.2 mL/kg/hr); Drains:30; Stool:110]  Weight: 74.3 kg     Medications:   Scheduled medications  acetaminophen, 650 mg, nasogastric tube, q6h   Or  acetaminophen, 650 mg, nasogastric tube, q6h   Or  acetaminophen, 650 mg, rectal, q6h  ampicillin-sulbactam, 3 g, intravenous, q24h  diatrizoate iva-diatrizoat sod, 90 mL, oral, Once  docusate sodium, 100 mg, oral, BID  heparin (porcine), 5,000 Units, subcutaneous, q8h " LILY  metoprolol tartrate, 25 mg, oral, BID  pantoprazole, 40 mg, intravenous, Daily before breakfast  phenylephrine, 1 spray, Each Nostril, Once  polyethylene glycol, 17 g, oral, Daily      Continuous medications  lactated Ringer's, 100 mL/hr, Last Rate: 100 mL/hr (05/03/24 0917)      PRN medications  PRN medications: benzocaine-menthol, [Held by provider] morphine, naloxone, ondansetron **OR** ondansetron    Relevant Results  Results for orders placed or performed during the hospital encounter of 04/24/24 (from the past 24 hour(s))   Basic Metabolic Panel   Result Value Ref Range    Glucose 101 (H) 74 - 99 mg/dL    Sodium 133 (L) 136 - 145 mmol/L    Potassium 5.1 3.5 - 5.3 mmol/L    Chloride 104 98 - 107 mmol/L    Bicarbonate 21 21 - 32 mmol/L    Anion Gap 13 10 - 20 mmol/L    Urea Nitrogen 40 (H) 6 - 23 mg/dL    Creatinine 7.35 (H) 0.50 - 1.30 mg/dL    eGFR 9 (L) >60 mL/min/1.73m*2    Calcium 7.5 (L) 8.6 - 10.3 mg/dL   Lavender Top   Result Value Ref Range    Extra Tube Hold for add-ons.        XR chest 1 view    Result Date: 4/26/2024  Interpreted By:  Reji Senior, STUDY: XR CHEST 1 VIEW;  4/26/2024 1:32 pm   INDICATION: Signs/Symptoms:NG placement confirmation.   COMPARISON: CT scan abdomen and pelvis from 04/24/2024. Chest x-ray from 04/24/2024.   ACCESSION NUMBER(S): [id number]   ORDERING CLINICIAN: SUMA CANAS   TECHNIQUE: Single AP portable view of the chest was obtained.   FINDINGS: MEDIASTINUM/ LUNGS/ HAILEY:   Suboptimal level of inspiration. Mild cardiomegaly. No gross vascular congestion or pleural effusion. No mass or pneumonia in either lung. No pneumothorax. No tracheal deviation. No abnormal hilar fullness or gross mass on either side.   BONES: No lytic or blastic destructive bone lesion.   UPPER ABDOMEN: Distal tip of a newly placed NG tube is in the lateral proximal stomach. There is mild-to-moderate colonic stool and gas across the transverse colon.       Hypoventilatory exam.   Mild  cardiomegaly.   NG tube in place as described.   MACRO: None   Signed by: Reji Senior 4/26/2024 1:45 PM Dictation workstation:   [id number]    ECG 12 Lead    Result Date: 4/24/2024  Sinus tachycardia Minimal voltage criteria for LVH, may be normal variant ( Sokolow-Busch ) Borderline ECG No previous ECGs available See ED provider note for full interpretation and clinical correlation Confirmed by Jyoti Melendrez ([id number]) on 4/24/2024 4:32:39 PM    XR chest 1 view    Result Date: 4/24/2024  Interpreted By:  Joleen Pascual, STUDY: Chest, single AP view.   INDICATION: Signs/Symptoms:Pre-op.   COMPARISON: CT of the abdomen and pelvis study dated 04/24/2024.   ACCESSION NUMBER(S): [id number]   ORDERING CLINICIAN: MEHDI BROWN   FINDINGS: The cardiac silhouette size is within normal limits. There is no focal consolidation, edema or pneumothorax. No sizeable pleural effusion. Calcified granuloma noted in the right mid lung. No acute osseous abnormality.       1. No acute cardiopulmonary process.   MACRO: None.   Signed by: Joleen Pascual 4/24/2024 11:59 AM Dictation workstation:   [id number]    CT abdomen pelvis w IV contrast    Result Date: 4/24/2024  Interpreted By:  Julianne Meneses, STUDY: CT ABDOMEN PELVIS W IV CONTRAST;  4/24/2024 11:00 am   INDICATION: Signs/Symptoms:lower abdominal pain and swelling eval for possible incarcerated hernia.   COMPARISON: None.   ACCESSION NUMBER(S): [id number]   ORDERING CLINICIAN: AILYN PANG   TECHNIQUE: CT of the abdomen and pelvis was performed.  85 mL Omnipaque 350   FINDINGS: LOWER CHEST: Images of the lung bases show no infiltrate or pleural fluid.   ABDOMEN:   LIVER: There is no hepatic mass.   BILE DUCTS: There is no intrahepatic, common hepatic or common bile ductal dilatation.   GALLBLADDER: The gallbladder is unremarkable.   PANCREAS: The pancreas is unremarkable.   SPLEEN: The spleen is unremarkable. There is no splenic mass or splenomegaly.   ADRENAL  GLANDS: The adrenal glands are unremarkable.   KIDNEYS AND URETERS: The kidneys function symmetrically. The kidneys demonstrate no mass. There is no intrarenal calculus or hydronephrosis.     VESSELS: The abdominal and pelvic vessels are unremarkable.   PERITONEUM/RETROPERITONEUM/LYMPH NODES: There is no retroperitoneal or pelvic adenopathy.  There is no ascites.   ABDOMINAL WALL/BOWEL: There is thickening of the right and left rectus abdominis muscles. There is an abscess containing fluid and air in the anterior abdominal wall the in the midline between the rectus abdominis muscles. This measures 9.0 x 2.7 x 3.6 cm in longitudinal, transverse and AP dimensions respectively. There are multiple smaller dots of air in the subcutaneous fat. There is an abscess with an air-fluid level centrally in the pelvis measuring 9.1 x 7.9 x 3.8 cm in longitudinal, transverse and AP dimensions respectively. There are multiple dots of air in the peritoneal cavity around this larger abscess. Findings are consistent with a viscus perforation, likely extending into the anterior abdominal wall. There is a 2.0 x 0.8 cm abscess in the left side of the pelvis with smaller dots of air. There is a thickened loop of sigmoid colon and this is likely perforated sigmoid diverticulitis.   BONE AND SOFT TISSUE: There is no acute osseous finding.   There is no soft tissue abnormality.       1. Central intraabdominal abscess measuring 9.1 x 7.9 x 3.8 cm.. There are multiple dots of air around this larger abscess consistent with localized perforation and viscus perforation. This is likely perforated sigmoid diverticulitis. There are smaller abscesses in the abdomen and pelvis.   2. This abscess extends into the anterior abdominal wall between the rectus abdominis muscles.   MACRO: Julianne Meneses discussed the significance and urgency of this critical finding by secure chat with Dionisio Salinas and AILYN PANG on 4/24/2024 at 11:40 am.  (**-RCF-**)  "Findings:  See findings.   Signed by: Julianne Meneses 4/24/2024 11:41 AM Dictation workstation:   [id number]       Assessment/Plan   Principal Problem:    Bowel perforation (Multi)  Active Problems:    Intra-abdominal abscess (Multi)    Diverticulitis    Acute kidney injury (CMS-HCC)    Primary hypertension    James Ramirez is a 45 yo male who is admitted with bowel perforation and is POD #9 s/p ex lap, drainage of abdominal wall abscess, partial colon resection with diverting end colostomy with Dr Salinas. Intra-operative findings showed \" This appeared to be a colon cancer that had eroded into the abdominal wall and was densely adherent to the bladder.  Damage control operation was performed with washout placement of drains and diverting end colostomy.\" On exam, abdomen is soft minimally distended, wound vac is functioning appropriately with SSD in canister. Colostomy Beefy red, moist, producing brown liquid stool, + gas, well pouched.     Plan:   POD #9 s/p ex lap, partial colon resection and end colostomy post-op course has been complicated by ileus and HARLEY   - continue soft diet  - Jaron when tolerating PO   - continue wound VAC  - DUSTY drains are out   - strict I&O  - enterostomal RN and dietician following     Renal:   HARLEY  - trend Cr 7.35  - accurate I&O   - avoid nephrotoxic medications  - BP stable   - lytes normal   - nephrology following     ID: afebrile   Bowel perforation   - ID recs: continue unasyn and DC with augmentin 500 every day through 5/7.       Heme:   Anemia  - no s/s of bleeding    Dispo: surgery will continue to follow, should start DC planning. Discussed with Dr Salinas     I spent 35 minutes in the professional and overall care of this patient.      Rebecca Palacio, APRN-CNP      "

## 2024-05-03 NOTE — PROGRESS NOTES
"James Ramirez is a 44 y.o. male on day 8 of admission presenting with Bowel perforation (Multi).    Subjective   Patient tolerating p.o. diet.  Walking.  Feeling better       Objective     Physical Exam  Vitals reviewed.   Constitutional:       Appearance: Normal appearance.   HENT:      Head: Normocephalic and atraumatic.      Right Ear: Tympanic membrane, ear canal and external ear normal.      Left Ear: Tympanic membrane, ear canal and external ear normal.      Nose: Nose normal.      Mouth/Throat:      Pharynx: Oropharynx is clear.   Eyes:      Extraocular Movements: Extraocular movements intact.      Conjunctiva/sclera: Conjunctivae normal.      Pupils: Pupils are equal, round, and reactive to light.   Cardiovascular:      Rate and Rhythm: Normal rate and regular rhythm.      Pulses: Normal pulses.      Heart sounds: Normal heart sounds.   Pulmonary:      Effort: Pulmonary effort is normal.      Breath sounds: Normal breath sounds.   Abdominal:      General: Abdomen is flat. Bowel sounds are normal.      Palpations: Abdomen is soft.      Comments: Ileostomy pouch   Musculoskeletal:      Cervical back: Normal range of motion and neck supple.   Skin:     General: Skin is warm and dry.   Neurological:      General: No focal deficit present.      Mental Status: He is alert and oriented to person, place, and time.   Psychiatric:         Mood and Affect: Mood normal.         Last Recorded Vitals  Blood pressure (!) 149/92, pulse 73, temperature 37 °C (98.6 °F), temperature source Temporal, resp. rate 18, height 1.83 m (6' 0.05\"), weight 74.3 kg (163 lb 14.4 oz), SpO2 99%.  Intake/Output last 3 Shifts:  I/O last 3 completed shifts:  In: 4258.5 (57.3 mL/kg) [I.V.:5.1 (0.1 mL/kg); IV Piggyback:4253.3]  Out: 2740 (36.9 mL/kg) [Urine:2700 (1 mL/kg/hr); Drains:30; Stool:10]  Weight: 74.3 kg     Relevant Results              Scheduled medications  acetaminophen, 650 mg, nasogastric tube, q6h   Or  acetaminophen, 650 mg, " nasogastric tube, q6h   Or  acetaminophen, 650 mg, rectal, q6h  ampicillin-sulbactam, 3 g, intravenous, q24h  diatrizoate iva-diatrizoat sod, 90 mL, oral, Once  docusate sodium, 100 mg, oral, BID  heparin (porcine), 5,000 Units, subcutaneous, q8h LILY  metoprolol tartrate, 25 mg, oral, BID  pantoprazole, 40 mg, intravenous, Daily before breakfast  phenylephrine, 1 spray, Each Nostril, Once  polyethylene glycol, 17 g, oral, Daily      Continuous medications  potassium [id number]-X3-5.45%NaCl, 100 mL/hr, Last Rate: 100 mL/hr (05/02/24 2057)      PRN medications  PRN medications: benzocaine-menthol, [Held by provider] morphine, naloxone, ondansetron **OR** ondansetron  Results for orders placed or performed during the hospital encounter of 04/24/24 (from the past 24 hour(s))   Basic Metabolic Panel   Result Value Ref Range    Glucose 107 (H) 74 - 99 mg/dL    Sodium 133 (L) 136 - 145 mmol/L    Potassium 4.8 3.5 - 5.3 mmol/L    Chloride 103 98 - 107 mmol/L    Bicarbonate 20 (L) 21 - 32 mmol/L    Anion Gap 15 10 - 20 mmol/L    Urea Nitrogen 41 (H) 6 - 23 mg/dL    Creatinine 7.55 (H) 0.50 - 1.30 mg/dL    eGFR 9 (L) >60 mL/min/1.73m*2    Calcium 7.5 (L) 8.6 - 10.3 mg/dL   CBC   Result Value Ref Range    WBC 12.3 (H) 4.4 - 11.3 x10*3/uL    nRBC 0.0 0.0 - 0.0 /100 WBCs    RBC 3.35 (L) 4.50 - 5.90 x10*6/uL    Hemoglobin 8.5 (L) 13.5 - 17.5 g/dL    Hematocrit 25.9 (L) 41.0 - 52.0 %    MCV 77 (L) 80 - 100 fL    MCH 25.4 (L) 26.0 - 34.0 pg    MCHC 32.8 32.0 - 36.0 g/dL    RDW 14.1 11.5 - 14.5 %    Platelets 522 (H) 150 - 450 x10*3/uL     XR chest abdomen for OG NG placement    Result Date: 5/1/2024  Interpreted By:  Randolph Gtz, STUDY: XR CHEST ABDOMEN FOR OG NG PLACEMENT;  5/1/2024 12:36 am   INDICATION: Signs/Symptoms:please verify NGT placement, may be dislodged. thank you.   COMPARISON: None.   ACCESSION NUMBER(S): [id number]   ORDERING CLINICIAN: BRANDON MOLINA   TECHNIQUE: Abdomen supine and upright views   Chest PA view    FINDINGS: Enteric tube terminates in the left upper quadrant with side hole below the GE junction.   ABDOMEN: The bowel gas pattern is nonobstructed with gas and stool seen to level of the rectum. Nondilated gaseous distended loops of bowel in the upper abdomen may relate to ileus. No pneumatosis or portal venous gas. Limited evaluation for free air. Cutaneous staples overlie the mid abdomen.   No suspicious abdominal or pelvic calcification.   Osseous structures demonstrate no acute abnormality.   CHEST: Cardiomediastinal silhouette in normal in size and configuration.   The lung apices are excluded from the field of view limiting evaluation. No pneumothorax, pulmonary consolidation or pleural effusion.   No acute osseous abnormality.       1.  Enteric tube terminates in the left upper quadrant with side hole below the GE junction. Gaseous distended loops of bowel in the visualized upper abdomen may relate to ileus. Attention on follow-up is advised. 2.  No acute cardiopulmonary process.   MACRO: None   Signed by: Randolph Gtz 5/1/2024 1:26 AM Dictation workstation:   [id number]                  Assessment/Plan   Principal Problem:    Bowel perforation (Multi)  Active Problems:    Intra-abdominal abscess (Multi)    Diverticulitis    Acute kidney injury (CMS-HCC)    Primary hypertension    Kidney function still elevated  Drains removed when she is removed patient is making progress  IV Unasyn plan to switch to p.o. Augmentin per ID at discharge  Discharge pending kidney function improvement       I spent  minutes in the professional and overall care of this patient.      Zahira Miller MD

## 2024-05-03 NOTE — CONSULTS
Nutrition Assessment Note    Reason for Assessment  Reason for Assessment: Provider consult order    Pt admitted for:  Bowel perforation (Multi) [K63.1]  Intra-abdominal abscess (Multi) [K65.1]    Consult placed for   Recent weight loss, POD # 9 s/p emergent ex-lap, partial colon rxn and DLI   Chart reviewed and pt visited.  Per pt had a decreased appetite prior to admission however still ate per his norm.  Pt grazes at baseline.  Pt NPO/ Clear liquids x 7 days. Diet advanced 5/1.  Per notes pt had NG from 4/26-5/1 for LIWS    Pt agreeable to supplement while admitted.    Results for orders placed or performed during the hospital encounter of 04/24/24 (from the past 24 hour(s))   Basic Metabolic Panel   Result Value Ref Range    Glucose 101 (H) 74 - 99 mg/dL    Sodium 133 (L) 136 - 145 mmol/L    Potassium 5.1 3.5 - 5.3 mmol/L    Chloride 104 98 - 107 mmol/L    Bicarbonate 21 21 - 32 mmol/L    Anion Gap 13 10 - 20 mmol/L    Urea Nitrogen 40 (H) 6 - 23 mg/dL    Creatinine 7.35 (H) 0.50 - 1.30 mg/dL    eGFR 9 (L) >60 mL/min/1.73m*2    Calcium 7.5 (L) 8.6 - 10.3 mg/dL   Lavender Top   Result Value Ref Range    Extra Tube Hold for add-ons.      Scheduled medications  acetaminophen, 650 mg, nasogastric tube, q6h   Or  acetaminophen, 650 mg, nasogastric tube, q6h   Or  acetaminophen, 650 mg, rectal, q6h  amLODIPine, 5 mg, oral, Daily  ampicillin-sulbactam, 3 g, intravenous, q24h  diatrizoate iva-diatrizoat sod, 90 mL, oral, Once  docusate sodium, 100 mg, oral, BID  heparin (porcine), 5,000 Units, subcutaneous, q8h LILY  metoprolol tartrate, 25 mg, oral, BID  pantoprazole, 40 mg, intravenous, Daily before breakfast  phenylephrine, 1 spray, Each Nostril, Once  polyethylene glycol, 17 g, oral, Daily      Continuous medications  lactated Ringer's, 100 mL/hr, Last Rate: 100 mL/hr (05/03/24 0917)      PRN medications  PRN medications: benzocaine-menthol, [Held by provider] morphine, naloxone, ondansetron **OR**  "ondansetron  Dietary Orders (From admission, onward)       Start     Ordered    05/03/24 1444  Oral nutritional supplements  Until discontinued        Question Answer Comment   Deliver with All meals    Select supplement: Ensure Plus High Protein        05/03/24 1443    05/01/24 0946  Adult diet Regular; Easy to chew  Diet effective now        Question Answer Comment   Diet type Regular    Texture Easy to chew        05/01/24 0945    04/24/24 1433  May Participate in Room Service  Once        Question:  .  Answer:  Yes    04/24/24 1433                  History:  Food and Nutrient History  Energy Intake: Poor < 50 %  Food and Nutrient History: NPO/ clears x 7 days    Anthropometrics:  Height: 183 cm (6' 0.05\")  Weight: 74.3 kg (163 lb 14.4 oz)  BMI (Calculated): 22.2    Weight Change  Weight History / % Weight Change: 78.4kg 4/24/24, 73.0kg 4/29/24, 71.4kg 5/1/24  Significant Weight Loss: Yes  Interpretation of Weight Loss: >2% in 1 week    Estimated Energy Needs  Total Energy Estimated Needs (kCal): 2230 kCal  Total Estimated Energy Need per Day (kCal/kg): 2600 kCal/kg  Method for Estimating Needs: 30-35    Estimated Protein Needs  Total Protein Estimated Needs (g): 75 g  Total Protein Estimated Needs (g/kg): 110 g/kg  Method for Estimating Needs: 1.0-1.5    Estimated Fluid Needs  Method for Estimating Needs: 1ml/kcal or per MD    Nutrition Focused Physical Findings:  Subcutaneous Fat Loss  Orbital Fat Pads: Mild-Moderate (slight dark circles and slight hollowing)  Buccal Fat Pads: Well nourished (full, rounded cheeks)    Muscle Wasting  Temporalis: Mild-Moderate (slight depression)  Pectoralis (Clavicular Region): Severe (protruding prominent clavicle)    Edema  Edema: +1 trace  Edema Location: RLE, LLE    Physical Findings (Nutrition Deficiency/Toxicity)  Skin: Positive (surgical incision, ostomy, DUSTY drain, wound vac)     Nutrition Diagnosis   Malnutrition Diagnosis  Patient has Malnutrition Diagnosis: " Yes  Diagnosis Status: New  Malnutrition Diagnosis: Severe malnutrition related to chronic disease or condition  As Evidenced by: > 2% weight loss in 1 week, prolonged poor intake of < 50% of estimated energy needs in > 5 days, moderate subcutaneous fat loss, moderate to severe muscle wasting and trace fluid accumulation present    Nutrition Interventions/Recommendations   Food and/or Nutrient Delivery Interventions  Meals and Snacks: General healthful diet  Goal: > 75% of meals consumed     Medical Food Supplement: Commercial beverage  Goal: Ensure TID for encouraged intake    Coordination of Nutrition Care by a Nutrition Professional  Collaboration and Referral of Nutrition Care: Collaboration by nutrition professional with other providers    Education Documentation  No documentation found.      Nutrition Monitoring and Evaluation   Food and Nutrient Related History  Energy Intake: Estimated energy intake    Fluid Intake: Estimated fluid intake    Amount of Food: Estimated amout of food, Medical food intake    Mealtime Behavior: Limited number of accepted foods    Anthropometrics: Body Composition/Growth/Weight History  Weight Change: Weight gain, Weight loss    Biochemical Data, Medical Tests and Procedures  Electrolyte and Renal Panel: Other (Comment)  Criteria: as clinically indicated    Gastrointestinal Profile: Other (Comment)  Criteria: as clinically indicated    Glucose/Endocrine Profile: Other (Comment)  Criteria: as clinically indicated    Nutritional Anemia Profile: Other (Comment)  Criteria: as clinically indicated    Vitamin Profile: Other (Comment)  Criteria: as clinically indicated    Nutrition Focused Physical Findings  Adipose: Loss of subcutaneous fat    Digestive System: Decrease in appetite    Muscles: Muscle atrophy    Skin: Impaired wound healing    Other: Fluid Accumulation, Stool output, Urine volume, Overall appearance    Follow Up  Time Spent (min): 60 minutes  Last Date of Nutrition  Visit: 05/03/24  Nutrition Follow-Up Needed?: Dietitian to reassess per policy  Follow up Comment: DUYEN ALMANZA

## 2024-05-03 NOTE — ED PROCEDURE NOTE
Procedure  Critical Care    Performed by: Roc Watts MD  Authorized by: Roc Watts MD    Critical care provider statement:     Critical care time (minutes):  65    Critical care time was exclusive of:  Separately billable procedures and treating other patients    Critical care was necessary to treat or prevent imminent or life-threatening deterioration of the following conditions:  Sepsis (Perforated bowel.)    Critical care was time spent personally by me on the following activities:  Blood draw for specimens, discussions with consultants, evaluation of patient's response to treatment, examination of patient, ordering and performing treatments and interventions, ordering and review of laboratory studies, ordering and review of radiographic studies, pulse oximetry and re-evaluation of patient's condition    Care discussed with: admitting provider                 Roc Watts MD  05/02/24 2314

## 2024-05-04 LAB
ANION GAP SERPL CALC-SCNC: 16 MMOL/L (ref 10–20)
BUN SERPL-MCNC: 40 MG/DL (ref 6–23)
CALCIUM SERPL-MCNC: 8 MG/DL (ref 8.6–10.3)
CHLORIDE SERPL-SCNC: 106 MMOL/L (ref 98–107)
CO2 SERPL-SCNC: 19 MMOL/L (ref 21–32)
CREAT SERPL-MCNC: 6.97 MG/DL (ref 0.5–1.3)
EGFRCR SERPLBLD CKD-EPI 2021: 9 ML/MIN/1.73M*2
ERYTHROCYTE [DISTWIDTH] IN BLOOD BY AUTOMATED COUNT: 14.6 % (ref 11.5–14.5)
GLUCOSE SERPL-MCNC: 83 MG/DL (ref 74–99)
HCT VFR BLD AUTO: 24.2 % (ref 41–52)
HGB BLD-MCNC: 7.7 G/DL (ref 13.5–17.5)
MCH RBC QN AUTO: 24.9 PG (ref 26–34)
MCHC RBC AUTO-ENTMCNC: 31.8 G/DL (ref 32–36)
MCV RBC AUTO: 78 FL (ref 80–100)
NRBC BLD-RTO: 0 /100 WBCS (ref 0–0)
PLATELET # BLD AUTO: 530 X10*3/UL (ref 150–450)
POTASSIUM SERPL-SCNC: 5.2 MMOL/L (ref 3.5–5.3)
RBC # BLD AUTO: 3.09 X10*6/UL (ref 4.5–5.9)
SODIUM SERPL-SCNC: 136 MMOL/L (ref 136–145)
WBC # BLD AUTO: 10.6 X10*3/UL (ref 4.4–11.3)

## 2024-05-04 PROCEDURE — 36415 COLL VENOUS BLD VENIPUNCTURE: CPT | Performed by: INTERNAL MEDICINE

## 2024-05-04 PROCEDURE — 85027 COMPLETE CBC AUTOMATED: CPT | Performed by: INTERNAL MEDICINE

## 2024-05-04 PROCEDURE — 2500000004 HC RX 250 GENERAL PHARMACY W/ HCPCS (ALT 636 FOR OP/ED): Performed by: INTERNAL MEDICINE

## 2024-05-04 PROCEDURE — 99232 SBSQ HOSP IP/OBS MODERATE 35: CPT | Performed by: INTERNAL MEDICINE

## 2024-05-04 PROCEDURE — 2500000001 HC RX 250 WO HCPCS SELF ADMINISTERED DRUGS (ALT 637 FOR MEDICARE OP): Performed by: INTERNAL MEDICINE

## 2024-05-04 PROCEDURE — 80048 BASIC METABOLIC PNL TOTAL CA: CPT | Performed by: INTERNAL MEDICINE

## 2024-05-04 PROCEDURE — 1100000001 HC PRIVATE ROOM DAILY

## 2024-05-04 RX ORDER — AMLODIPINE BESYLATE 10 MG/1
10 TABLET ORAL DAILY
Status: DISCONTINUED | OUTPATIENT
Start: 2024-05-05 | End: 2024-05-09 | Stop reason: HOSPADM

## 2024-05-04 RX ADMIN — SODIUM CHLORIDE, POTASSIUM CHLORIDE, SODIUM LACTATE AND CALCIUM CHLORIDE 100 ML/HR: 600; 310; 30; 20 INJECTION, SOLUTION INTRAVENOUS at 15:07

## 2024-05-04 RX ADMIN — HEPARIN SODIUM 5000 UNITS: 5000 INJECTION INTRAVENOUS; SUBCUTANEOUS at 05:16

## 2024-05-04 RX ADMIN — HEPARIN SODIUM 5000 UNITS: 5000 INJECTION INTRAVENOUS; SUBCUTANEOUS at 21:42

## 2024-05-04 RX ADMIN — METOPROLOL TARTRATE 25 MG: 25 TABLET, FILM COATED ORAL at 21:43

## 2024-05-04 RX ADMIN — AMLODIPINE BESYLATE 5 MG: 5 TABLET ORAL at 10:09

## 2024-05-04 RX ADMIN — HEPARIN SODIUM 5000 UNITS: 5000 INJECTION INTRAVENOUS; SUBCUTANEOUS at 15:07

## 2024-05-04 RX ADMIN — METOPROLOL TARTRATE 25 MG: 25 TABLET, FILM COATED ORAL at 10:09

## 2024-05-04 RX ADMIN — AMPICILLIN SODIUM AND SULBACTAM SODIUM 3 G: 2; 1 INJECTION, POWDER, FOR SOLUTION INTRAMUSCULAR; INTRAVENOUS at 10:09

## 2024-05-04 ASSESSMENT — COGNITIVE AND FUNCTIONAL STATUS - GENERAL
MOBILITY SCORE: 24
DAILY ACTIVITIY SCORE: 24
MOBILITY SCORE: 24
DAILY ACTIVITIY SCORE: 24

## 2024-05-04 ASSESSMENT — PAIN - FUNCTIONAL ASSESSMENT
PAIN_FUNCTIONAL_ASSESSMENT: 0-10

## 2024-05-04 ASSESSMENT — PAIN SCALES - GENERAL
PAINLEVEL_OUTOF10: 0 - NO PAIN

## 2024-05-04 ASSESSMENT — PAIN SCALES - WONG BAKER: WONGBAKER_NUMERICALRESPONSE: NO HURT

## 2024-05-04 NOTE — PROGRESS NOTES
James Ramirez is a 44 y.o. male on day 10 of admission presenting with Bowel perforation (Multi).      Subjective   Awake, alert tolerating p.o. intake on IV fluids       Objective        Vitals 24HR  Heart Rate:  [66-71]   Temp:  [36.7 °C (98.1 °F)-37.2 °C (99 °F)]   Resp:  [18]   BP: (129-148)/(83-90)   SpO2:  [99 %-100 %]     Intake/Output last 3 Shifts:    Intake/Output Summary (Last 24 hours) at 5/4/2024 1439  Last data filed at 5/4/2024 1331  Gross per 24 hour   Intake 4375.34 ml   Output 1800 ml   Net 2575.34 ml       Physical Exam    GEN appearance: Awake alert no distress  Head and ENT: Normocephalic/atraumatic/supple neck/no JVD  Lungs: CTA  Heart: RRR  Abdomen: Nondistended positive ostomy bag noted  Extremities: No edema            Relevant Results     Results from last 7 days   Lab Units 05/04/24  0511 05/02/24  0506 04/30/24  0500   WBC AUTO x10*3/uL 10.6 12.3* 14.8*   HEMOGLOBIN g/dL 7.7* 8.5* 9.1*   HEMATOCRIT % 24.2* 25.9* 28.3*   PLATELETS AUTO x10*3/uL 530* 522* 547*      Results from last 7 days   Lab Units 05/04/24  0511 05/03/24  0501 05/02/24  0506   SODIUM mmol/L 136 133* 133*   POTASSIUM mmol/L 5.2 5.1 4.8   CHLORIDE mmol/L 106 104 103   CO2 mmol/L 19* 21 20*   BUN mg/dL 40* 40* 41*   CREATININE mg/dL 6.97* 7.35* 7.55*   GLUCOSE mg/dL 83 101* 107*   CALCIUM mg/dL 8.0* 7.5* 7.5*        Current Facility-Administered Medications:     acetaminophen (Tylenol) tablet 650 mg, 650 mg, nasogastric tube, q6h, 650 mg at 05/03/24 1016 **OR** acetaminophen (Tylenol) oral liquid 650 mg, 650 mg, nasogastric tube, q6h, 650 mg at 04/29/24 0931 **OR** acetaminophen (Tylenol) suppository 650 mg, 650 mg, rectal, q6h, Blayne Villar PA-C    amLODIPine (Norvasc) tablet 5 mg, 5 mg, oral, Daily, Felix Garcia MD, 5 mg at 05/04/24 1009    ampicillin-sulbactam (Unasyn) in sodium chloride 0.9 % 100 mL 3 g, 3 g, intravenous, q24h, Nael Arrington MD, Stopped at 05/04/24 1039    benzocaine-menthol (Cepastat Sore Throat)  lozenge 1 lozenge, 1 lozenge, Mouth/Throat, q2h PRN, Zahira Miller MD, 1 lozenge at 04/27/24 2151    diatrizoate iva-diatrizoat sod (Gastrografin 37% organic bound iodine) solution 90 mL, 90 mL, oral, Once, Manuel Sharma PA-C    docusate sodium (Colace) capsule 100 mg, 100 mg, oral, BID, Zahira Miller MD, 100 mg at 05/03/24 0916    heparin (porcine) injection 5,000 Units, 5,000 Units, subcutaneous, q8h LILY, Zahira Miller MD, 5,000 Units at 05/04/24 0516    lactated Ringer's infusion, 100 mL/hr, intravenous, Continuous, Felix Garcia MD, Last Rate: 100 mL/hr at 05/04/24 1331, 100 mL/hr at 05/04/24 1331    metoprolol tartrate (Lopressor) tablet 25 mg, 25 mg, oral, BID, Zahira Miller MD, 25 mg at 05/04/24 1009    [Held by provider] morphine injection 2 mg, 2 mg, intravenous, q4h PRN, Zahira Miller MD    naloxone (Narcan) injection 0.2 mg, 0.2 mg, intravenous, PRN, Dionisio Salinas MD    ondansetron (Zofran) tablet 4 mg, 4 mg, oral, q6h PRN **OR** ondansetron (Zofran) injection 4 mg, 4 mg, intravenous, q6h PRN, Blayne Villar PA-C, 4 mg at 04/27/24 2105    pantoprazole (ProtoNix) injection 40 mg, 40 mg, intravenous, Daily before breakfast, Zahira Miller MD, 40 mg at 05/03/24 0702    phenylephrine (Nathen-Synephrine) 0.25 % nasal spray 1 spray, 1 spray, Each Nostril, Once, Blayne Villar PA-C    polyethylene glycol (Glycolax, Miralax) packet 17 g, 17 g, oral, Daily, Zahira Miller MD, 17 g at 04/28/24 0926           Assessment/Plan   1.  HARLEY with ATN with probable vancomycin toxicity, gradual resolving  2.  Bowel perforation due to diverticulitis status post partial colon resection with diverting end colostomy.  Intra-abdominal abscess following ID and general surgery.  3.  Hypertension, continue current management  Will continue to follow-up closely during the labs and I will monitor complaints.           Principal Problem:    Bowel perforation (Multi)  Active Problems:    Intra-abdominal abscess (Multi)    Diverticulitis     Acute kidney injury (CMS-HCC)    Primary hypertension       I spent 35 minutes in the professional and overall care of this patient.      Bella Reeves MD

## 2024-05-04 NOTE — CARE PLAN
The patient's goals for the shift include      The clinical goals for the shift include patient will remain hemodynamically stable throughout the shift    Problem: Nutrition  Goal: Less than 5 days NPO/clear liquids  Outcome: Progressing  Goal: Oral intake greater than 50%  Outcome: Progressing  Goal: Oral intake greater 75%  Outcome: Progressing  Goal: Consume prescribed supplement  Outcome: Progressing  Goal: Adequate PO fluid intake  Outcome: Progressing  Goal: Nutrition support goals are met within 48 hrs  Outcome: Progressing  Goal: Nutrition support is meeting 75% of nutrient needs  Outcome: Progressing  Goal: Tube feed tolerance  Outcome: Progressing  Goal: BG  mg/dL  Outcome: Progressing  Goal: Lab values WNL  Outcome: Progressing  Goal: Electrolytes WNL  Outcome: Progressing  Goal: Promote healing  Outcome: Progressing  Goal: Maintain stable weight  Outcome: Progressing  Goal: Reduce weight from edema/fluid  Outcome: Progressing  Goal: Gradual weight gain  Outcome: Progressing  Goal: Improve ostomy output  Outcome: Progressing     Problem: Pain - Adult  Goal: Verbalizes/displays adequate comfort level or baseline comfort level  Outcome: Progressing     Problem: Safety - Adult  Goal: Free from fall injury  Outcome: Progressing     Problem: Discharge Planning  Goal: Discharge to home or other facility with appropriate resources  Outcome: Progressing     Problem: Chronic Conditions and Co-morbidities  Goal: Patient's chronic conditions and co-morbidity symptoms are monitored and maintained or improved  Outcome: Progressing     Problem: Pain  Goal: Takes deep breaths with improved pain control throughout the shift  Outcome: Progressing  Goal: Turns in bed with improved pain control throughout the shift  Outcome: Progressing  Goal: Walks with improved pain control throughout the shift  Outcome: Progressing  Goal: Performs ADL's with improved pain control throughout shift  Outcome: Progressing  Goal:  Participates in PT with improved pain control throughout the shift  Outcome: Progressing  Goal: Free from opioid side effects throughout the shift  Outcome: Progressing  Goal: Free from acute confusion related to pain meds throughout the shift  Outcome: Progressing

## 2024-05-04 NOTE — PROGRESS NOTES
Doing okay.,  Offers no complaints    Tolerating a diet    Abdomen soft, wound VAC in place, ostomy pink, healthy with stool    Lab Results   Component Value Date    WBC 10.6 05/04/2024    HGB 7.7 (L) 05/04/2024    HCT 24.2 (L) 05/04/2024    MCV 78 (L) 05/04/2024     (H) 05/04/2024     Lab Results   Component Value Date    GLUCOSE 83 05/04/2024     05/04/2024    K 5.2 05/04/2024     05/04/2024    CO2 19 (L) 05/04/2024    ANIONGAP 16 05/04/2024    BUN 40 (H) 05/04/2024    CREATININE 6.97 (H) 05/04/2024    EGFR 9 (L) 05/04/2024    CALCIUM 8.0 (L) 05/04/2024    ALBUMIN 2.3 (L) 04/29/2024    ALKPHOS 56 04/28/2024    PROT 5.4 (L) 04/28/2024    AST 17 04/28/2024    BILITOT 0.5 04/28/2024    ALT 6 (L) 04/28/2024         Status post partial colectomy and end colostomy.  Developed renal failure and postoperative ileus.  Ileus seems to be improving.  We are slowly advancing diet.  Nephrology is following.   Discharge planning

## 2024-05-04 NOTE — CARE PLAN
The patient's goals for the shift include  to be able to rest    The clinical goals for the shift include Patient to remain free of falls this shift    Over the shift, the patient did not make progress toward the following goals. Barriers to progression include finding a comfortable position to rest. Recommendations to address these barriers include utilization of PRN pain meds, bundling care and allowing patient to rest.

## 2024-05-05 LAB
ANION GAP SERPL CALC-SCNC: 13 MMOL/L (ref 10–20)
BUN SERPL-MCNC: 41 MG/DL (ref 6–23)
CALCIUM SERPL-MCNC: 7.6 MG/DL (ref 8.6–10.3)
CHLORIDE SERPL-SCNC: 106 MMOL/L (ref 98–107)
CO2 SERPL-SCNC: 20 MMOL/L (ref 21–32)
CREAT SERPL-MCNC: 6.48 MG/DL (ref 0.5–1.3)
EGFRCR SERPLBLD CKD-EPI 2021: 10 ML/MIN/1.73M*2
ERYTHROCYTE [DISTWIDTH] IN BLOOD BY AUTOMATED COUNT: 14.9 % (ref 11.5–14.5)
GLUCOSE SERPL-MCNC: 90 MG/DL (ref 74–99)
HCT VFR BLD AUTO: 22 % (ref 41–52)
HGB BLD-MCNC: 7.1 G/DL (ref 13.5–17.5)
MCH RBC QN AUTO: 25.4 PG (ref 26–34)
MCHC RBC AUTO-ENTMCNC: 32.3 G/DL (ref 32–36)
MCV RBC AUTO: 79 FL (ref 80–100)
NRBC BLD-RTO: 0 /100 WBCS (ref 0–0)
PLATELET # BLD AUTO: 524 X10*3/UL (ref 150–450)
POTASSIUM SERPL-SCNC: 4.8 MMOL/L (ref 3.5–5.3)
RBC # BLD AUTO: 2.8 X10*6/UL (ref 4.5–5.9)
SODIUM SERPL-SCNC: 134 MMOL/L (ref 136–145)
WBC # BLD AUTO: 9.6 X10*3/UL (ref 4.4–11.3)

## 2024-05-05 PROCEDURE — 2500000004 HC RX 250 GENERAL PHARMACY W/ HCPCS (ALT 636 FOR OP/ED): Performed by: INTERNAL MEDICINE

## 2024-05-05 PROCEDURE — 2500000001 HC RX 250 WO HCPCS SELF ADMINISTERED DRUGS (ALT 637 FOR MEDICARE OP): Performed by: INTERNAL MEDICINE

## 2024-05-05 PROCEDURE — 80048 BASIC METABOLIC PNL TOTAL CA: CPT

## 2024-05-05 PROCEDURE — 85027 COMPLETE CBC AUTOMATED: CPT

## 2024-05-05 PROCEDURE — 99232 SBSQ HOSP IP/OBS MODERATE 35: CPT | Performed by: INTERNAL MEDICINE

## 2024-05-05 PROCEDURE — 1100000001 HC PRIVATE ROOM DAILY

## 2024-05-05 PROCEDURE — 36415 COLL VENOUS BLD VENIPUNCTURE: CPT

## 2024-05-05 PROCEDURE — 99233 SBSQ HOSP IP/OBS HIGH 50: CPT | Performed by: NURSE PRACTITIONER

## 2024-05-05 RX ADMIN — HEPARIN SODIUM 5000 UNITS: 5000 INJECTION INTRAVENOUS; SUBCUTANEOUS at 05:36

## 2024-05-05 RX ADMIN — METOPROLOL TARTRATE 25 MG: 25 TABLET, FILM COATED ORAL at 20:14

## 2024-05-05 RX ADMIN — SODIUM CHLORIDE, POTASSIUM CHLORIDE, SODIUM LACTATE AND CALCIUM CHLORIDE 100 ML/HR: 600; 310; 30; 20 INJECTION, SOLUTION INTRAVENOUS at 09:49

## 2024-05-05 RX ADMIN — AMPICILLIN SODIUM AND SULBACTAM SODIUM 3 G: 2; 1 INJECTION, POWDER, FOR SOLUTION INTRAMUSCULAR; INTRAVENOUS at 09:51

## 2024-05-05 RX ADMIN — HEPARIN SODIUM 5000 UNITS: 5000 INJECTION INTRAVENOUS; SUBCUTANEOUS at 13:01

## 2024-05-05 RX ADMIN — METOPROLOL TARTRATE 25 MG: 25 TABLET, FILM COATED ORAL at 08:48

## 2024-05-05 RX ADMIN — AMLODIPINE BESYLATE 10 MG: 10 TABLET ORAL at 08:48

## 2024-05-05 RX ADMIN — HEPARIN SODIUM 5000 UNITS: 5000 INJECTION INTRAVENOUS; SUBCUTANEOUS at 20:15

## 2024-05-05 RX ADMIN — SODIUM CHLORIDE, POTASSIUM CHLORIDE, SODIUM LACTATE AND CALCIUM CHLORIDE 100 ML/HR: 600; 310; 30; 20 INJECTION, SOLUTION INTRAVENOUS at 01:27

## 2024-05-05 ASSESSMENT — COGNITIVE AND FUNCTIONAL STATUS - GENERAL
MOBILITY SCORE: 24
MOBILITY SCORE: 24
DAILY ACTIVITIY SCORE: 24
DAILY ACTIVITIY SCORE: 24

## 2024-05-05 ASSESSMENT — PAIN SCALES - GENERAL
PAINLEVEL_OUTOF10: 0 - NO PAIN

## 2024-05-05 ASSESSMENT — PAIN - FUNCTIONAL ASSESSMENT
PAIN_FUNCTIONAL_ASSESSMENT: 0-10

## 2024-05-05 NOTE — PROGRESS NOTES
"James Ramirez is a 44 y.o. male on day 11 of admission presenting with Bowel perforation (Multi).    Subjective   Looking better today, pain is well controlled, tolerating soft diet. 250cc of stool per 24h he is now emptying his own bag. He denies n/v, fever, chills, CP and SOB.        Objective     Physical Exam  Constitutional:       Appearance: Normal appearance.   Eyes:      Conjunctiva/sclera: Conjunctivae normal.   Cardiovascular:      Rate and Rhythm: Normal rate.      Pulses: Normal pulses.   Pulmonary:      Effort: Pulmonary effort is normal.   Abdominal:      Comments: Mildly distended, soft, appropriately tender, wound vac with SSD in canister, colostomy Beefy red, moist, producing brown stool, + gas, well pouched      Genitourinary:     Comments: Voiding without difficulty   Musculoskeletal:         General: Normal range of motion.   Skin:     General: Skin is warm.   Neurological:      Mental Status: He is oriented to person, place, and time.   Psychiatric:         Mood and Affect: Mood normal.         Behavior: Behavior normal.         Last Recorded Vitals  Blood pressure (!) 155/96, pulse 73, temperature 37 °C (98.6 °F), temperature source Temporal, resp. rate 18, height 1.83 m (6' 0.05\"), weight 74.3 kg (163 lb 14.4 oz), SpO2 100%.  Intake/Output last 3 Shifts:  I/O last 3 completed shifts:  In: 6185.3 (83.2 mL/kg) [P.O.:2272; I.V.:3913.3 (52.6 mL/kg)]  Out: 3950 (53.1 mL/kg) [Urine:3600 (1.3 mL/kg/hr); Stool:350]  Weight: 74.3 kg     Medications:   Scheduled medications  acetaminophen, 650 mg, nasogastric tube, q6h   Or  acetaminophen, 650 mg, nasogastric tube, q6h   Or  acetaminophen, 650 mg, rectal, q6h  amLODIPine, 10 mg, oral, Daily  ampicillin-sulbactam, 3 g, intravenous, q24h  diatrizoate iva-diatrizoat sod, 90 mL, oral, Once  docusate sodium, 100 mg, oral, BID  heparin (porcine), 5,000 Units, subcutaneous, q8h LILY  metoprolol tartrate, 25 mg, oral, BID  pantoprazole, 40 mg, intravenous, Daily " before breakfast  phenylephrine, 1 spray, Each Nostril, Once  polyethylene glycol, 17 g, oral, Daily      Continuous medications  lactated Ringer's, 100 mL/hr, Last Rate: 100 mL/hr (05/05/24 0949)      PRN medications  PRN medications: benzocaine-menthol, [Held by provider] morphine, naloxone, ondansetron **OR** ondansetron    Relevant Results  Results for orders placed or performed during the hospital encounter of 04/24/24 (from the past 24 hour(s))   CBC   Result Value Ref Range    WBC 9.6 4.4 - 11.3 x10*3/uL    nRBC 0.0 0.0 - 0.0 /100 WBCs    RBC 2.80 (L) 4.50 - 5.90 x10*6/uL    Hemoglobin 7.1 (L) 13.5 - 17.5 g/dL    Hematocrit 22.0 (L) 41.0 - 52.0 %    MCV 79 (L) 80 - 100 fL    MCH 25.4 (L) 26.0 - 34.0 pg    MCHC 32.3 32.0 - 36.0 g/dL    RDW 14.9 (H) 11.5 - 14.5 %    Platelets 524 (H) 150 - 450 x10*3/uL   Basic Metabolic Panel   Result Value Ref Range    Glucose 90 74 - 99 mg/dL    Sodium 134 (L) 136 - 145 mmol/L    Potassium 4.8 3.5 - 5.3 mmol/L    Chloride 106 98 - 107 mmol/L    Bicarbonate 20 (L) 21 - 32 mmol/L    Anion Gap 13 10 - 20 mmol/L    Urea Nitrogen 41 (H) 6 - 23 mg/dL    Creatinine 6.48 (H) 0.50 - 1.30 mg/dL    eGFR 10 (L) >60 mL/min/1.73m*2    Calcium 7.6 (L) 8.6 - 10.3 mg/dL       XR chest 1 view    Result Date: 4/26/2024  Interpreted By:  Reji Senior, STUDY: XR CHEST 1 VIEW;  4/26/2024 1:32 pm   INDICATION: Signs/Symptoms:NG placement confirmation.   COMPARISON: CT scan abdomen and pelvis from 04/24/2024. Chest x-ray from 04/24/2024.   ACCESSION NUMBER(S): [id number]   ORDERING CLINICIAN: SUMA CANAS   TECHNIQUE: Single AP portable view of the chest was obtained.   FINDINGS: MEDIASTINUM/ LUNGS/ HAILEY:   Suboptimal level of inspiration. Mild cardiomegaly. No gross vascular congestion or pleural effusion. No mass or pneumonia in either lung. No pneumothorax. No tracheal deviation. No abnormal hilar fullness or gross mass on either side.   BONES: No lytic or blastic destructive bone lesion.    UPPER ABDOMEN: Distal tip of a newly placed NG tube is in the lateral proximal stomach. There is mild-to-moderate colonic stool and gas across the transverse colon.       Hypoventilatory exam.   Mild cardiomegaly.   NG tube in place as described.   MACRO: None   Signed by: Reji Senior 4/26/2024 1:45 PM Dictation workstation:   [id number]    ECG 12 Lead    Result Date: 4/24/2024  Sinus tachycardia Minimal voltage criteria for LVH, may be normal variant ( Sokolow-Busch ) Borderline ECG No previous ECGs available See ED provider note for full interpretation and clinical correlation Confirmed by Jyoti Melendrez ([id number]) on 4/24/2024 4:32:39 PM    XR chest 1 view    Result Date: 4/24/2024  Interpreted By:  Joleen Pascual, STUDY: Chest, single AP view.   INDICATION: Signs/Symptoms:Pre-op.   COMPARISON: CT of the abdomen and pelvis study dated 04/24/2024.   ACCESSION NUMBER(S): [id number]   ORDERING CLINICIAN: MEHDI BROWN   FINDINGS: The cardiac silhouette size is within normal limits. There is no focal consolidation, edema or pneumothorax. No sizeable pleural effusion. Calcified granuloma noted in the right mid lung. No acute osseous abnormality.       1. No acute cardiopulmonary process.   MACRO: None.   Signed by: oJleen Pascual 4/24/2024 11:59 AM Dictation workstation:   [id number]    CT abdomen pelvis w IV contrast    Result Date: 4/24/2024  Interpreted By:  Julianne Meneses, STUDY: CT ABDOMEN PELVIS W IV CONTRAST;  4/24/2024 11:00 am   INDICATION: Signs/Symptoms:lower abdominal pain and swelling eval for possible incarcerated hernia.   COMPARISON: None.   ACCESSION NUMBER(S): [id number]   ORDERING CLINICIAN: AILYN PANG   TECHNIQUE: CT of the abdomen and pelvis was performed.  85 mL Omnipaque 350   FINDINGS: LOWER CHEST: Images of the lung bases show no infiltrate or pleural fluid.   ABDOMEN:   LIVER: There is no hepatic mass.   BILE DUCTS: There is no intrahepatic, common hepatic or common bile  ductal dilatation.   GALLBLADDER: The gallbladder is unremarkable.   PANCREAS: The pancreas is unremarkable.   SPLEEN: The spleen is unremarkable. There is no splenic mass or splenomegaly.   ADRENAL GLANDS: The adrenal glands are unremarkable.   KIDNEYS AND URETERS: The kidneys function symmetrically. The kidneys demonstrate no mass. There is no intrarenal calculus or hydronephrosis.     VESSELS: The abdominal and pelvic vessels are unremarkable.   PERITONEUM/RETROPERITONEUM/LYMPH NODES: There is no retroperitoneal or pelvic adenopathy.  There is no ascites.   ABDOMINAL WALL/BOWEL: There is thickening of the right and left rectus abdominis muscles. There is an abscess containing fluid and air in the anterior abdominal wall the in the midline between the rectus abdominis muscles. This measures 9.0 x 2.7 x 3.6 cm in longitudinal, transverse and AP dimensions respectively. There are multiple smaller dots of air in the subcutaneous fat. There is an abscess with an air-fluid level centrally in the pelvis measuring 9.1 x 7.9 x 3.8 cm in longitudinal, transverse and AP dimensions respectively. There are multiple dots of air in the peritoneal cavity around this larger abscess. Findings are consistent with a viscus perforation, likely extending into the anterior abdominal wall. There is a 2.0 x 0.8 cm abscess in the left side of the pelvis with smaller dots of air. There is a thickened loop of sigmoid colon and this is likely perforated sigmoid diverticulitis.   BONE AND SOFT TISSUE: There is no acute osseous finding.   There is no soft tissue abnormality.       1. Central intraabdominal abscess measuring 9.1 x 7.9 x 3.8 cm.. There are multiple dots of air around this larger abscess consistent with localized perforation and viscus perforation. This is likely perforated sigmoid diverticulitis. There are smaller abscesses in the abdomen and pelvis.   2. This abscess extends into the anterior abdominal wall between the rectus  "abdominis muscles.   MACRO: Julianne Meneses discussed the significance and urgency of this critical finding by secure chat with Dionisio Salinas and AILYN PANG on 4/24/2024 at 11:40 am.  (**-RCF-**) Findings:  See findings.   Signed by: Julianne Gideon 4/24/2024 11:41 AM Dictation workstation:   [id number]       Assessment/Plan   Principal Problem:    Bowel perforation (Multi)  Active Problems:    Intra-abdominal abscess (Multi)    Diverticulitis    Acute kidney injury (CMS-HCC)    Primary hypertension    James Ramirez is a 45 yo male who is admitted with bowel perforation and is POD #11 s/p ex lap, drainage of abdominal wall abscess, partial colon resection with diverting end colostomy with Dr Salinas. Intra-operative findings showed \" This appeared to be a colon cancer that had eroded into the abdominal wall and was densely adherent to the bladder.  Damage control operation was performed with washout placement of drains and diverting end colostomy.\" On exam, abdomen is soft minimally distended, wound vac is functioning appropriately with SSD in canister. Colostomy Beefy red, moist, producing brown liquid stool, + gas, well pouched.     Plan:   POD #11 s/p ex lap, partial colon resection and end colostomy post-op course has been complicated by ileus and HARLEY   - continue soft diet  - Jaron when tolerating PO   - continue wound VAC  - DUSTY drains are out   - strict I&O  - enterostomal RN and dietician following     Renal:   HARLEY  - trend Cr - downtrending   - accurate I&O   - avoid nephrotoxic medications  - BP stable   - lytes normal   - nephrology following     ID: afebrile   Bowel perforation   - ID recs: continue unasyn and DC with augmentin 500 every day through 5/7.       Heme:   Anemia  - no s/s of bleeding    Dispo: surgery will continue to follow, should start DC planning. Discussed with Dr Salinas     I spent 35 minutes in the professional and overall care of this patient.      Rebecca Palacio, APRN-CNP      "

## 2024-05-05 NOTE — PROGRESS NOTES
"James Ramirez is a 44 y.o. male on day 10 of admission presenting with Bowel perforation (Multi).    Subjective   Patient tolerating p.o. diet.  Walking.  Feeling better       Objective     Physical Exam  Vitals reviewed.   Constitutional:       Appearance: Normal appearance.   HENT:      Head: Normocephalic and atraumatic.      Right Ear: Tympanic membrane, ear canal and external ear normal.      Left Ear: Tympanic membrane, ear canal and external ear normal.      Nose: Nose normal.      Mouth/Throat:      Pharynx: Oropharynx is clear.   Eyes:      Extraocular Movements: Extraocular movements intact.      Conjunctiva/sclera: Conjunctivae normal.      Pupils: Pupils are equal, round, and reactive to light.   Cardiovascular:      Rate and Rhythm: Normal rate and regular rhythm.      Pulses: Normal pulses.      Heart sounds: Normal heart sounds.   Pulmonary:      Effort: Pulmonary effort is normal.      Breath sounds: Normal breath sounds.   Abdominal:      General: Abdomen is flat. Bowel sounds are normal.      Palpations: Abdomen is soft.      Comments: Ileostomy pouch   Musculoskeletal:      Cervical back: Normal range of motion and neck supple.   Skin:     General: Skin is warm and dry.   Neurological:      General: No focal deficit present.      Mental Status: He is alert and oriented to person, place, and time.   Psychiatric:         Mood and Affect: Mood normal.         Last Recorded Vitals  Blood pressure (!) 155/91, pulse 82, temperature 36.8 °C (98.2 °F), temperature source Temporal, resp. rate 20, height 1.83 m (6' 0.05\"), weight 74.3 kg (163 lb 14.4 oz), SpO2 100%.  Intake/Output last 3 Shifts:  I/O last 3 completed shifts:  In: 4833.7 (65 mL/kg) [P.O.:1072; I.V.:3761.7 (50.6 mL/kg)]  Out: 3470 (46.7 mL/kg) [Urine:3350 (1.3 mL/kg/hr); Drains:20; Stool:100]  Weight: 74.3 kg     Relevant Results              Scheduled medications  acetaminophen, 650 mg, nasogastric tube, q6h   Or  acetaminophen, 650 mg, " nasogastric tube, q6h   Or  acetaminophen, 650 mg, rectal, q6h  amLODIPine, 5 mg, oral, Daily  ampicillin-sulbactam, 3 g, intravenous, q24h  diatrizoate iva-diatrizoat sod, 90 mL, oral, Once  docusate sodium, 100 mg, oral, BID  heparin (porcine), 5,000 Units, subcutaneous, q8h LILY  metoprolol tartrate, 25 mg, oral, BID  pantoprazole, 40 mg, intravenous, Daily before breakfast  phenylephrine, 1 spray, Each Nostril, Once  polyethylene glycol, 17 g, oral, Daily      Continuous medications  lactated Ringer's, 100 mL/hr, Last Rate: 100 mL/hr (05/04/24 1806)      PRN medications  PRN medications: benzocaine-menthol, [Held by provider] morphine, naloxone, ondansetron **OR** ondansetron  Results for orders placed or performed during the hospital encounter of 04/24/24 (from the past 24 hour(s))   Basic Metabolic Panel   Result Value Ref Range    Glucose 83 74 - 99 mg/dL    Sodium 136 136 - 145 mmol/L    Potassium 5.2 3.5 - 5.3 mmol/L    Chloride 106 98 - 107 mmol/L    Bicarbonate 19 (L) 21 - 32 mmol/L    Anion Gap 16 10 - 20 mmol/L    Urea Nitrogen 40 (H) 6 - 23 mg/dL    Creatinine 6.97 (H) 0.50 - 1.30 mg/dL    eGFR 9 (L) >60 mL/min/1.73m*2    Calcium 8.0 (L) 8.6 - 10.3 mg/dL   CBC   Result Value Ref Range    WBC 10.6 4.4 - 11.3 x10*3/uL    nRBC 0.0 0.0 - 0.0 /100 WBCs    RBC 3.09 (L) 4.50 - 5.90 x10*6/uL    Hemoglobin 7.7 (L) 13.5 - 17.5 g/dL    Hematocrit 24.2 (L) 41.0 - 52.0 %    MCV 78 (L) 80 - 100 fL    MCH 24.9 (L) 26.0 - 34.0 pg    MCHC 31.8 (L) 32.0 - 36.0 g/dL    RDW 14.6 (H) 11.5 - 14.5 %    Platelets 530 (H) 150 - 450 x10*3/uL     No results found.                Assessment/Plan   Principal Problem:    Bowel perforation (Multi)  Active Problems:    Intra-abdominal abscess (Multi)    Diverticulitis    Acute kidney injury (CMS-HCC)    Primary hypertension    Kidney function starting to improve  Having bowel movements  Tolerating oral intake   IV Unasyn plan to switch to p.o. Augmentin per ID at discharge  Continue  wound care  Continue ostomy care blood pressure is running high  Increase Norvasc continue Toprol    Discharge pending kidney function improvement       I spent  minutes in the professional and overall care of this patient.      Zahira Miller MD

## 2024-05-05 NOTE — PROGRESS NOTES
James Ramirez is a 44 y.o. male on day 11 of admission presenting with Bowel perforation (Multi).      Subjective   Doing well with no new complaints       Objective        Vitals 24HR  Heart Rate:  [73-82]   Temp:  [36.8 °C (98.2 °F)-37.1 °C (98.8 °F)]   Resp:  [18-20]   BP: (132-155)/(89-96)   SpO2:  [99 %-100 %]     Intake/Output last 3 Shifts:    Intake/Output Summary (Last 24 hours) at 5/5/2024 1319  Last data filed at 5/5/2024 0500  Gross per 24 hour   Intake 2640 ml   Output 1950 ml   Net 690 ml       Physical Exam        GEN appearance: Awake alert no distress  Head and ENT: Normocephalic/atraumatic/supple neck/no JVD  Lungs: CTA  Heart: RRR  Abdomen: Nondistended positive ostomy bag noted  Extremities: No edema               Relevant Results     Results from last 7 days   Lab Units 05/05/24  0459 05/04/24  0511 05/02/24  0506   WBC AUTO x10*3/uL 9.6 10.6 12.3*   HEMOGLOBIN g/dL 7.1* 7.7* 8.5*   HEMATOCRIT % 22.0* 24.2* 25.9*   PLATELETS AUTO x10*3/uL 524* 530* 522*      Results from last 7 days   Lab Units 05/05/24  0459 05/04/24  0511 05/03/24  0501   SODIUM mmol/L 134* 136 133*   POTASSIUM mmol/L 4.8 5.2 5.1   CHLORIDE mmol/L 106 106 104   CO2 mmol/L 20* 19* 21   BUN mg/dL 41* 40* 40*   CREATININE mg/dL 6.48* 6.97* 7.35*   GLUCOSE mg/dL 90 83 101*   CALCIUM mg/dL 7.6* 8.0* 7.5*        Current Facility-Administered Medications:     acetaminophen (Tylenol) tablet 650 mg, 650 mg, nasogastric tube, q6h, 650 mg at 05/03/24 1016 **OR** acetaminophen (Tylenol) oral liquid 650 mg, 650 mg, nasogastric tube, q6h, 650 mg at 04/29/24 0931 **OR** acetaminophen (Tylenol) suppository 650 mg, 650 mg, rectal, q6h, Blayne Villar PA-C    amLODIPine (Norvasc) tablet 10 mg, 10 mg, oral, Daily, Zahira Miller MD, 10 mg at 05/05/24 0848    ampicillin-sulbactam (Unasyn) in sodium chloride 0.9 % 100 mL 3 g, 3 g, intravenous, q24h, Nael Arrington MD, Stopped at 05/05/24 1021    benzocaine-menthol (Cepastat Sore Throat) lozenge 1  lozenge, 1 lozenge, Mouth/Throat, q2h PRN, Zahira Miller MD, 1 lozenge at 04/27/24 2151    diatrizoate iva-diatrizoat sod (Gastrografin 37% organic bound iodine) solution 90 mL, 90 mL, oral, Once, Manuel Sharma PA-C    docusate sodium (Colace) capsule 100 mg, 100 mg, oral, BID, Zahira Miller MD, 100 mg at 05/03/24 0916    heparin (porcine) injection 5,000 Units, 5,000 Units, subcutaneous, q8h LILY, Zahira Miller MD, 5,000 Units at 05/05/24 1301    lactated Ringer's infusion, 100 mL/hr, intravenous, Continuous, Felix Garcia MD, Last Rate: 100 mL/hr at 05/05/24 0949, 100 mL/hr at 05/05/24 0949    metoprolol tartrate (Lopressor) tablet 25 mg, 25 mg, oral, BID, Zahira Miller MD, 25 mg at 05/05/24 0848    [Held by provider] morphine injection 2 mg, 2 mg, intravenous, q4h PRN, Zahira Miller MD    naloxone (Narcan) injection 0.2 mg, 0.2 mg, intravenous, PRN, Dionisio Salinas MD    ondansetron (Zofran) tablet 4 mg, 4 mg, oral, q6h PRN **OR** ondansetron (Zofran) injection 4 mg, 4 mg, intravenous, q6h PRN, Blayne Villar PA-C, 4 mg at 04/27/24 2105    pantoprazole (ProtoNix) injection 40 mg, 40 mg, intravenous, Daily before breakfast, Zahira Miller MD, 40 mg at 05/03/24 0702    phenylephrine (Nathen-Synephrine) 0.25 % nasal spray 1 spray, 1 spray, Each Nostril, Once, Blayne Villar PA-C    polyethylene glycol (Glycolax, Miralax) packet 17 g, 17 g, oral, Daily, Zahira Miller MD, 17 g at 04/28/24 0926           Assessment/Plan      1.  HARLEY with ATN with probable vancomycin toxicity, gradual resolving  BUN=41, Cr=6.48  2.  Bowel perforation due to diverticulitis status post partial colon resection with diverting end colostomy.  Intra-abdominal abscess following ID and general surgery.  3.  Hypertension, continue current management  Will continue to follow-up closely during the labs and I will monitor complaints.                  Principal Problem:    Bowel perforation (Multi)  Active Problems:    Intra-abdominal abscess (Multi)     Diverticulitis    Acute kidney injury (CMS-HCC)    Primary hypertension         I spent 35 minutes in the professional and overall care of this patient.      Bella Reeves MD

## 2024-05-05 NOTE — CARE PLAN
The patient's goals for the shift include  none at this point    The clinical goals for the shift include Patient to be able to care for his own colostomy    Over the shift, the patient did not make progress toward the following goals. Barriers to progression include unwillingness to care for own stoma. Recommendations to address these barriers include reinforce teaching and encourage self-care.

## 2024-05-06 LAB
ANION GAP SERPL CALC-SCNC: 16 MMOL/L (ref 10–20)
BUN SERPL-MCNC: 37 MG/DL (ref 6–23)
CALCIUM SERPL-MCNC: 7.6 MG/DL (ref 8.6–10.3)
CHLORIDE SERPL-SCNC: 106 MMOL/L (ref 98–107)
CO2 SERPL-SCNC: 21 MMOL/L (ref 21–32)
CREAT SERPL-MCNC: 5.9 MG/DL (ref 0.5–1.3)
EGFRCR SERPLBLD CKD-EPI 2021: 11 ML/MIN/1.73M*2
ERYTHROCYTE [DISTWIDTH] IN BLOOD BY AUTOMATED COUNT: 15.4 % (ref 11.5–14.5)
GLUCOSE SERPL-MCNC: 105 MG/DL (ref 74–99)
HCT VFR BLD AUTO: 26.2 % (ref 41–52)
HGB BLD-MCNC: 8.2 G/DL (ref 13.5–17.5)
MCH RBC QN AUTO: 25.2 PG (ref 26–34)
MCHC RBC AUTO-ENTMCNC: 31.3 G/DL (ref 32–36)
MCV RBC AUTO: 81 FL (ref 80–100)
NRBC BLD-RTO: 0 /100 WBCS (ref 0–0)
PLATELET # BLD AUTO: 620 X10*3/UL (ref 150–450)
POTASSIUM SERPL-SCNC: 4.2 MMOL/L (ref 3.5–5.3)
RBC # BLD AUTO: 3.25 X10*6/UL (ref 4.5–5.9)
SODIUM SERPL-SCNC: 139 MMOL/L (ref 136–145)
WBC # BLD AUTO: 8.3 X10*3/UL (ref 4.4–11.3)

## 2024-05-06 PROCEDURE — 2500000001 HC RX 250 WO HCPCS SELF ADMINISTERED DRUGS (ALT 637 FOR MEDICARE OP): Performed by: INTERNAL MEDICINE

## 2024-05-06 PROCEDURE — 97116 GAIT TRAINING THERAPY: CPT | Mod: GP,CQ

## 2024-05-06 PROCEDURE — 1100000001 HC PRIVATE ROOM DAILY

## 2024-05-06 PROCEDURE — C9113 INJ PANTOPRAZOLE SODIUM, VIA: HCPCS | Performed by: INTERNAL MEDICINE

## 2024-05-06 PROCEDURE — 36415 COLL VENOUS BLD VENIPUNCTURE: CPT

## 2024-05-06 PROCEDURE — 2500000004 HC RX 250 GENERAL PHARMACY W/ HCPCS (ALT 636 FOR OP/ED): Performed by: INTERNAL MEDICINE

## 2024-05-06 PROCEDURE — 99231 SBSQ HOSP IP/OBS SF/LOW 25: CPT | Performed by: INTERNAL MEDICINE

## 2024-05-06 PROCEDURE — 80048 BASIC METABOLIC PNL TOTAL CA: CPT

## 2024-05-06 PROCEDURE — 82374 ASSAY BLOOD CARBON DIOXIDE: CPT

## 2024-05-06 PROCEDURE — 99233 SBSQ HOSP IP/OBS HIGH 50: CPT | Performed by: NURSE PRACTITIONER

## 2024-05-06 PROCEDURE — 85027 COMPLETE CBC AUTOMATED: CPT

## 2024-05-06 RX ADMIN — HEPARIN SODIUM 5000 UNITS: 5000 INJECTION INTRAVENOUS; SUBCUTANEOUS at 05:36

## 2024-05-06 RX ADMIN — SODIUM CHLORIDE, POTASSIUM CHLORIDE, SODIUM LACTATE AND CALCIUM CHLORIDE 100 ML/HR: 600; 310; 30; 20 INJECTION, SOLUTION INTRAVENOUS at 02:51

## 2024-05-06 RX ADMIN — AMPICILLIN SODIUM AND SULBACTAM SODIUM 3 G: 2; 1 INJECTION, POWDER, FOR SOLUTION INTRAMUSCULAR; INTRAVENOUS at 08:16

## 2024-05-06 RX ADMIN — HEPARIN SODIUM 5000 UNITS: 5000 INJECTION INTRAVENOUS; SUBCUTANEOUS at 21:02

## 2024-05-06 RX ADMIN — PANTOPRAZOLE SODIUM 40 MG: 40 INJECTION, POWDER, FOR SOLUTION INTRAVENOUS at 05:36

## 2024-05-06 RX ADMIN — SODIUM CHLORIDE, POTASSIUM CHLORIDE, SODIUM LACTATE AND CALCIUM CHLORIDE 100 ML/HR: 600; 310; 30; 20 INJECTION, SOLUTION INTRAVENOUS at 22:15

## 2024-05-06 RX ADMIN — METOPROLOL TARTRATE 25 MG: 25 TABLET, FILM COATED ORAL at 08:16

## 2024-05-06 RX ADMIN — AMLODIPINE BESYLATE 10 MG: 10 TABLET ORAL at 08:16

## 2024-05-06 RX ADMIN — METOPROLOL TARTRATE 25 MG: 25 TABLET, FILM COATED ORAL at 21:03

## 2024-05-06 ASSESSMENT — COGNITIVE AND FUNCTIONAL STATUS - GENERAL
MOBILITY SCORE: 24
DAILY ACTIVITIY SCORE: 24
MOBILITY SCORE: 24

## 2024-05-06 ASSESSMENT — PAIN SCALES - GENERAL
PAINLEVEL_OUTOF10: 0 - NO PAIN

## 2024-05-06 ASSESSMENT — PAIN - FUNCTIONAL ASSESSMENT
PAIN_FUNCTIONAL_ASSESSMENT: 0-10

## 2024-05-06 NOTE — ACP (ADVANCE CARE PLANNING)
"[facility] nursing visit outcome: Hands-on lesson was completed with Mr. Ramirez. He participated fully.    NPWT dressing on lower midline was changed.     [facility] next scheduled visit/plan: to assess tomorrow and provide additional supplies for home, along with supply list    Stoma Type: End Colostomy  Del: No  Diameter: 1 5/8\"  Location: LUQ  Protrusion: Budded  Mucosal Condition and Color: Moist, Red, Edematous  Mucocutaneous Junction: separation from 10-12 o'clock - narrow  Peristomal Skin: Clear, intact  Location of Skin Impairment: n/a  Peristomal Contour: Flat  Supportive Tissue: Semi-Soft  Character of Output: Watery and gas  Emptying Frequency: per nursing  Removed/Current Pouching System: 2 1/4\" Kervin New Image soft convex wafer with tape collar, barrier ring, lock n' roll pouch    Current Wearing Time: unsure, was placed over weekend    Recommendations:   Skin Care: stoma powder to denuded skin as needed with pouch change - was applied to separation today, education provided    Pouching System: used larger aperture: 2 1/4\" Kervin New Image soft convex wafer with tape collar, barrier ring, lock n' roll pouch    Wear Time: 3-4 Days  Other: as  regulates, may be able to use a closed pouching system    Photo: 5/6/2024  Lower midline    Incision: staples on 80% of wound, superior side, approximated; inferior end with NPWT dressing - creamy, tan drainage in canister - measures 6.5 x 4 x 2 cm; moist, pink/red base. Was cleaned with VASHE before NPWT was applied: Xeroform to base, 2 layers of black foam, -75 mmHG    Drain:  have been removed    Supplier:  [employer] - unclear about [facility] at this time    Comments: The pattern for the pouch and one additional set of supplies are at the bedside.     Time Increment: 60 minutes    Preeti Maier, CHIDIN, RN, CWOCN     "

## 2024-05-06 NOTE — CARE PLAN
The patient's goals for the shift include  improve meal intakes to go home    The clinical goals for the shift include Patient to be able to rest    Over the shift, the patient did not make progress toward the following goals. Barriers to progression include poor appetite. Recommendations to address these barriers include encourage small meals.

## 2024-05-06 NOTE — PROGRESS NOTES
"James Ramirez is a 44 y.o. male on day 12 of admission presenting with Bowel perforation (Multi).    Subjective   Patient tolerating p.o. diet.  Walking.  Feeling better       Objective     Physical Exam  Vitals reviewed.   Constitutional:       Appearance: Normal appearance.   HENT:      Head: Normocephalic and atraumatic.      Right Ear: Tympanic membrane, ear canal and external ear normal.      Left Ear: Tympanic membrane, ear canal and external ear normal.      Nose: Nose normal.      Mouth/Throat:      Pharynx: Oropharynx is clear.   Eyes:      Extraocular Movements: Extraocular movements intact.      Conjunctiva/sclera: Conjunctivae normal.      Pupils: Pupils are equal, round, and reactive to light.   Cardiovascular:      Rate and Rhythm: Normal rate and regular rhythm.      Pulses: Normal pulses.      Heart sounds: Normal heart sounds.   Pulmonary:      Effort: Pulmonary effort is normal.      Breath sounds: Normal breath sounds.   Abdominal:      General: Abdomen is flat. Bowel sounds are normal.      Palpations: Abdomen is soft.      Comments: Ileostomy pouch   Musculoskeletal:      Cervical back: Normal range of motion and neck supple.   Skin:     General: Skin is warm and dry.   Neurological:      General: No focal deficit present.      Mental Status: He is alert and oriented to person, place, and time.   Psychiatric:         Mood and Affect: Mood normal.         Last Recorded Vitals  Blood pressure (!) 151/97, pulse 74, temperature 36.4 °C (97.5 °F), temperature source Temporal, resp. rate 16, height 1.83 m (6' 0.05\"), weight 74.3 kg (163 lb 14.4 oz), SpO2 100%.  Intake/Output last 3 Shifts:  I/O last 3 completed shifts:  In: 3958.3 (53.2 mL/kg) [P.O.:1440; I.V.:2518.3 (33.9 mL/kg)]  Out: 4250 (57.2 mL/kg) [Urine:3950 (1.5 mL/kg/hr); Stool:300]  Weight: 74.3 kg     Relevant Results              Scheduled medications  acetaminophen, 650 mg, nasogastric tube, q6h   Or  acetaminophen, 650 mg, nasogastric " tube, q6h   Or  acetaminophen, 650 mg, rectal, q6h  amLODIPine, 10 mg, oral, Daily  ampicillin-sulbactam, 3 g, intravenous, q24h  diatrizoate iva-diatrizoat sod, 90 mL, oral, Once  docusate sodium, 100 mg, oral, BID  heparin (porcine), 5,000 Units, subcutaneous, q8h LILY  metoprolol tartrate, 25 mg, oral, BID  pantoprazole, 40 mg, intravenous, Daily before breakfast  phenylephrine, 1 spray, Each Nostril, Once  polyethylene glycol, 17 g, oral, Daily      Continuous medications  lactated Ringer's, 100 mL/hr, Last Rate: 100 mL/hr (05/06/24 0928)      PRN medications  PRN medications: benzocaine-menthol, [Held by provider] morphine, naloxone, ondansetron **OR** ondansetron  Results for orders placed or performed during the hospital encounter of 04/24/24 (from the past 24 hour(s))   CBC   Result Value Ref Range    WBC 8.3 4.4 - 11.3 x10*3/uL    nRBC 0.0 0.0 - 0.0 /100 WBCs    RBC 3.25 (L) 4.50 - 5.90 x10*6/uL    Hemoglobin 8.2 (L) 13.5 - 17.5 g/dL    Hematocrit 26.2 (L) 41.0 - 52.0 %    MCV 81 80 - 100 fL    MCH 25.2 (L) 26.0 - 34.0 pg    MCHC 31.3 (L) 32.0 - 36.0 g/dL    RDW 15.4 (H) 11.5 - 14.5 %    Platelets 620 (H) 150 - 450 x10*3/uL   Basic Metabolic Panel   Result Value Ref Range    Glucose 105 (H) 74 - 99 mg/dL    Sodium 139 136 - 145 mmol/L    Potassium 4.2 3.5 - 5.3 mmol/L    Chloride 106 98 - 107 mmol/L    Bicarbonate 21 21 - 32 mmol/L    Anion Gap 16 10 - 20 mmol/L    Urea Nitrogen 37 (H) 6 - 23 mg/dL    Creatinine 5.90 (H) 0.50 - 1.30 mg/dL    eGFR 11 (L) >60 mL/min/1.73m*2    Calcium 7.6 (L) 8.6 - 10.3 mg/dL     No results found.                Assessment/Plan   Principal Problem:    Bowel perforation (Multi)  Active Problems:    Intra-abdominal abscess (Multi)    Diverticulitis    Acute kidney injury (CMS-HCC)    Primary hypertension    Kidney function starting to improve  Continue IV fluids  Having bowel movements  Tolerating oral intake   IV Unasyn plan to switch to p.o. Augmentin per ID at  discharge  Continue wound care  Continue ostomy care blood pressure is running high  Blood pressure is doing little better with increased dose of Norvasc and Toprol dose      Discharge pending kidney function improvement       I spent  minutes in the professional and overall care of this patient.      Zahira Miller MD

## 2024-05-06 NOTE — PROGRESS NOTES
05/06/24 1058   Discharge Planning   Patient expects to be discharged to: Home with outpt wound and ostomy appts     ADOD 1-3 days  BARRIERS renal labs, needs nephrology to clear for dc  DISPO home with outpt appts

## 2024-05-06 NOTE — PROGRESS NOTES
James Ramirez is a 44 y.o. male on day 12 of admission presenting with Bowel perforation (Multi).      Subjective   Seen and examined.   Anxious for discharge.  He does not offer specific complaints.  Chart/labs/meds/notes/imaging/VS reviewed.       Objective          Vitals 24HR  Heart Rate:  [72-88]   Temp:  [36.4 °C (97.5 °F)-37.1 °C (98.8 °F)]   Resp:  [16-17]   BP: (142-151)/(87-97)   SpO2:  [99 %-100 %]     Intake/Output last 3 Shifts:    Intake/Output Summary (Last 24 hours) at 5/6/2024 1911  Last data filed at 5/6/2024 1525  Gross per 24 hour   Intake 3153.33 ml   Output 3950 ml   Net -796.67 ml       Physical Exam  Constitutional:       Appearance: Normal appearance.  In no acute distress.  Eyes:      Conjunctiva/sclera: Conjunctivae normal.   Cardiovascular:      Rate and Rhythm: Normal rate.  Normal S1 and S2.     Pulses: Normal pulses.   Pulmonary:      Effort: Pulmonary effort is normal.   Abdominal:      Comments: Wound vac and colostomy noted producing brown stool  Genitourinary:     Comments: No Batista  Musculoskeletal:         General: Normal range of motion.  No joint swelling.  Skin:     General: Skin is warm.  No rashes or lesions to exposed skin.  Neurological:      Mental Status: He is oriented to person, place, and time.  Nonfocal.  Psychiatric:         Mood and Affect: Mood normal.         Behavior: Behavior normal.     Scheduled Medications  acetaminophen, 650 mg, nasogastric tube, q6h   Or  acetaminophen, 650 mg, nasogastric tube, q6h   Or  acetaminophen, 650 mg, rectal, q6h  amLODIPine, 10 mg, oral, Daily  ampicillin-sulbactam, 3 g, intravenous, q24h  diatrizoate iva-diatrizoat sod, 90 mL, oral, Once  docusate sodium, 100 mg, oral, BID  heparin (porcine), 5,000 Units, subcutaneous, q8h LILY  metoprolol tartrate, 25 mg, oral, BID  pantoprazole, 40 mg, intravenous, Daily before breakfast  phenylephrine, 1 spray, Each Nostril, Once  polyethylene glycol, 17 g, oral, Daily      Continuous  medications  lactated Ringer's, 100 mL/hr, Last Rate: 100 mL/hr (05/06/24 0928)        PRN medications: benzocaine-menthol, [Held by provider] morphine, naloxone, ondansetron **OR** ondansetron     Relevant Results  Results from last 7 days   Lab Units 05/06/24  0624 05/05/24  0459 05/04/24  0511   WBC AUTO x10*3/uL 8.3 9.6 10.6   HEMOGLOBIN g/dL 8.2* 7.1* 7.7*   HEMATOCRIT % 26.2* 22.0* 24.2*   PLATELETS AUTO x10*3/uL 620* 524* 530*     Results from last 7 days   Lab Units 05/06/24 0624 05/05/24 0459 05/04/24  0511   SODIUM mmol/L 139 134* 136   POTASSIUM mmol/L 4.2 4.8 5.2   CHLORIDE mmol/L 106 106 106   CO2 mmol/L 21 20* 19*   BUN mg/dL 37* 41* 40*   CREATININE mg/dL 5.90* 6.48* 6.97*   GLUCOSE mg/dL 105* 90 83   CALCIUM mg/dL 7.6* 7.6* 8.0*       XR chest abdomen for OG NG placement   Final Result   1.  Enteric tube terminates in the left upper quadrant with side hole   below the GE junction. Gaseous distended loops of bowel in the   visualized upper abdomen may relate to ileus. Attention on follow-up   is advised.   2.  No acute cardiopulmonary process.        MACRO:   None        Signed by: Randolph Gtz 5/1/2024 1:26 AM   Dictation workstation:   [id number]      XR abdomen 1 view   Final Result   Positive contrast material in nondilated loops of bowel throughout   the abdomen.        MACRO:   None.        Signed by: Armida Helm 4/30/2024 11:02 AM   Dictation workstation:   [id number]      XR abdomen 1 view   Final Result   Postsurgical changes and support devices as above. Multiple distended   loops of large and small bowel.        MACRO:   None.        Signed by: Armida Helm 4/29/2024 1:24 PM   Dictation workstation:   [id number]      US renal complete   Final Result   1.  No hydronephrosis or hydroureter bilaterally.   2. Nonobstructive calculus in the left kidney.   3.  Increased cortical echogenicity within the kidneys bilaterally,   which can be seen with medical renal disease.        Signed  by: Ciro Patel 4/28/2024 5:50 PM   Dictation workstation:   [id number]      XR chest 2 views   Final Result   Left basilar infiltrate or atelectasis. Small effusion.        MACRO:   none        Signed by: Carissa Kim 4/29/2024 7:14 AM   Dictation workstation:   [id number]      XR chest 1 view   Final Result   Hypoventilatory exam.        Mild cardiomegaly.        NG tube in place as described.        MACRO:   None        Signed by: Reji Senior 4/26/2024 1:45 PM   Dictation workstation:   [id number]      XR chest 1 view   Final Result   1. No acute cardiopulmonary process.        MACRO:   None.        Signed by: Joleen Pascual 4/24/2024 11:59 AM   Dictation workstation:   [id number]      CT abdomen pelvis w IV contrast   Final Result   1. Central intraabdominal abscess measuring 9.1 x 7.9 x 3.8 cm..   There are multiple dots of air around this larger abscess consistent   with localized perforation and viscus perforation. This is likely   perforated sigmoid diverticulitis. There are smaller abscesses in the   abdomen and pelvis.        2. This abscess extends into the anterior abdominal wall between the   rectus abdominis muscles.        MACRO:   Julianne Meneses discussed the significance and urgency of this   critical finding by secure chat with Dionisio Salinas and AILYN PANG   on 4/24/2024 at 11:40 am.  (**-RCF-**) Findings:  See findings.        Signed by: Julianne Meneses 4/24/2024 11:41 AM   Dictation workstation:   [id number]               Assessment/Plan      Jamse Ramirez is a 44-year-old male who presented on 4/24 with abdominal discomfort who was found to have an intra-abdominal abscess with localized and viscus perforation.  Smaller abscesses were noted in the abdomen and pelvis extending into the anterior abdominal wall.  These findings were noted on contrast CT imaging.  He had normal renal function upon presentation.  He went for exploratory laparotomy with drainage of the abdominal wall  abscess, partial colon resection with diverting end colostomy on 4/24.  By 4/27 we noted worsening renal function.  Nephrology was consulted for acute kidney injury.    Mr. Ramirez creatinine peaked at 7.5 mg as a deciliter by 5/25.  Fortunately he did not require renal replacement therapy.  His creatinine is slowly improving with his current EGFR being 11%.  Acute kidney injury is due to acute tubular necrosis.  He has noted to have elevated vancomycin levels.  He did receive iodinated contrast with his creatinine rise potentially around the 48-hour rg and may be a potential culprit.  He had nearly a 4 g drop in his hemoglobin in the postoperative period with likely a hemodynamic component albeit no true hypotension documented.  He is no longer on antimicrobial coverage.  Blood pressures are holding steady.  He is voiding on his own and is nonoliguric.  There has been no indication for RRT. TP:Cr ratio with proteinuria is in the setting of ATN. This can be repeated as his renal function improves. He understands that he must avoid anti-inflammatories upon discharge.  He will need very close renal follow-up with an RFP within 48 hours of DC.  We will plan for discharge as early as tomorrow.  We will schedule him outpatient to be seen in the office - [phone].    Principal Problem:    Bowel perforation (Multi)  Active Problems:    Intra-abdominal abscess (Multi)    Diverticulitis    Acute kidney injury (CMS-HCC)    Primary hypertension         I spent 50 minutes in the professional and overall care of this patient.      Eulogio Gandhi, DO

## 2024-05-06 NOTE — PROGRESS NOTES
"James Ramirez is a 44 y.o. male on day 12 of admission presenting with Bowel perforation (Multi).    Subjective   Looking better today, pain is well controlled, tolerating soft diet. 50cc of stool per 24h he is now emptying his own bag. He denies n/v, fever, chills, CP and SOB.        Objective     Physical Exam  Constitutional:       Appearance: Normal appearance.   Eyes:      Conjunctiva/sclera: Conjunctivae normal.   Cardiovascular:      Rate and Rhythm: Normal rate.      Pulses: Normal pulses.   Pulmonary:      Effort: Pulmonary effort is normal.   Abdominal:      Comments: Mildly distended, soft, appropriately tender, wound vac with SSD in canister, colostomy Beefy red, moist, producing brown stool, + gas, well pouched      Genitourinary:     Comments: Voiding without difficulty   Musculoskeletal:         General: Normal range of motion.   Skin:     General: Skin is warm.   Neurological:      Mental Status: He is oriented to person, place, and time.   Psychiatric:         Mood and Affect: Mood normal.         Behavior: Behavior normal.         Last Recorded Vitals  Blood pressure (!) 151/97, pulse 74, temperature 36.4 °C (97.5 °F), temperature source Temporal, resp. rate 16, height 1.83 m (6' 0.05\"), weight 74.3 kg (163 lb 14.4 oz), SpO2 100%.  Intake/Output last 3 Shifts:  I/O last 3 completed shifts:  In: 3958.3 (53.2 mL/kg) [P.O.:1440; I.V.:2518.3 (33.9 mL/kg)]  Out: 4250 (57.2 mL/kg) [Urine:3950 (1.5 mL/kg/hr); Stool:300]  Weight: 74.3 kg     Medications:   Scheduled medications  acetaminophen, 650 mg, nasogastric tube, q6h   Or  acetaminophen, 650 mg, nasogastric tube, q6h   Or  acetaminophen, 650 mg, rectal, q6h  amLODIPine, 10 mg, oral, Daily  ampicillin-sulbactam, 3 g, intravenous, q24h  diatrizoate iva-diatrizoat sod, 90 mL, oral, Once  docusate sodium, 100 mg, oral, BID  heparin (porcine), 5,000 Units, subcutaneous, q8h LILY  metoprolol tartrate, 25 mg, oral, BID  pantoprazole, 40 mg, intravenous, Daily " before breakfast  phenylephrine, 1 spray, Each Nostril, Once  polyethylene glycol, 17 g, oral, Daily      Continuous medications  lactated Ringer's, 100 mL/hr, Last Rate: 100 mL/hr (05/06/24 0928)      PRN medications  PRN medications: benzocaine-menthol, [Held by provider] morphine, naloxone, ondansetron **OR** ondansetron    Relevant Results  Results for orders placed or performed during the hospital encounter of 04/24/24 (from the past 24 hour(s))   CBC   Result Value Ref Range    WBC 8.3 4.4 - 11.3 x10*3/uL    nRBC 0.0 0.0 - 0.0 /100 WBCs    RBC 3.25 (L) 4.50 - 5.90 x10*6/uL    Hemoglobin 8.2 (L) 13.5 - 17.5 g/dL    Hematocrit 26.2 (L) 41.0 - 52.0 %    MCV 81 80 - 100 fL    MCH 25.2 (L) 26.0 - 34.0 pg    MCHC 31.3 (L) 32.0 - 36.0 g/dL    RDW 15.4 (H) 11.5 - 14.5 %    Platelets 620 (H) 150 - 450 x10*3/uL   Basic Metabolic Panel   Result Value Ref Range    Glucose 105 (H) 74 - 99 mg/dL    Sodium 139 136 - 145 mmol/L    Potassium 4.2 3.5 - 5.3 mmol/L    Chloride 106 98 - 107 mmol/L    Bicarbonate 21 21 - 32 mmol/L    Anion Gap 16 10 - 20 mmol/L    Urea Nitrogen 37 (H) 6 - 23 mg/dL    Creatinine 5.90 (H) 0.50 - 1.30 mg/dL    eGFR 11 (L) >60 mL/min/1.73m*2    Calcium 7.6 (L) 8.6 - 10.3 mg/dL       XR chest 1 view    Result Date: 4/26/2024  Interpreted By:  Reji Senior, STUDY: XR CHEST 1 VIEW;  4/26/2024 1:32 pm   INDICATION: Signs/Symptoms:NG placement confirmation.   COMPARISON: CT scan abdomen and pelvis from 04/24/2024. Chest x-ray from 04/24/2024.   ACCESSION NUMBER(S): [id number]   ORDERING CLINICIAN: SUMA CANAS   TECHNIQUE: Single AP portable view of the chest was obtained.   FINDINGS: MEDIASTINUM/ LUNGS/ HAILEY:   Suboptimal level of inspiration. Mild cardiomegaly. No gross vascular congestion or pleural effusion. No mass or pneumonia in either lung. No pneumothorax. No tracheal deviation. No abnormal hilar fullness or gross mass on either side.   BONES: No lytic or blastic destructive bone lesion.   UPPER  ABDOMEN: Distal tip of a newly placed NG tube is in the lateral proximal stomach. There is mild-to-moderate colonic stool and gas across the transverse colon.       Hypoventilatory exam.   Mild cardiomegaly.   NG tube in place as described.   MACRO: None   Signed by: Reji Senior 4/26/2024 1:45 PM Dictation workstation:   [id number]    ECG 12 Lead    Result Date: 4/24/2024  Sinus tachycardia Minimal voltage criteria for LVH, may be normal variant ( Sokolow-Busch ) Borderline ECG No previous ECGs available See ED provider note for full interpretation and clinical correlation Confirmed by Jyoti Melendrez ([id number]) on 4/24/2024 4:32:39 PM    XR chest 1 view    Result Date: 4/24/2024  Interpreted By:  Joleen Pascual, STUDY: Chest, single AP view.   INDICATION: Signs/Symptoms:Pre-op.   COMPARISON: CT of the abdomen and pelvis study dated 04/24/2024.   ACCESSION NUMBER(S): [id number]   ORDERING CLINICIAN: MEHDI BROWN   FINDINGS: The cardiac silhouette size is within normal limits. There is no focal consolidation, edema or pneumothorax. No sizeable pleural effusion. Calcified granuloma noted in the right mid lung. No acute osseous abnormality.       1. No acute cardiopulmonary process.   MACRO: None.   Signed by: Joleen Pascual 4/24/2024 11:59 AM Dictation workstation:   [id number]    CT abdomen pelvis w IV contrast    Result Date: 4/24/2024  Interpreted By:  Julianne Meneses, STUDY: CT ABDOMEN PELVIS W IV CONTRAST;  4/24/2024 11:00 am   INDICATION: Signs/Symptoms:lower abdominal pain and swelling eval for possible incarcerated hernia.   COMPARISON: None.   ACCESSION NUMBER(S): [id number]   ORDERING CLINICIAN: AILYN PANG   TECHNIQUE: CT of the abdomen and pelvis was performed.  85 mL Omnipaque 350   FINDINGS: LOWER CHEST: Images of the lung bases show no infiltrate or pleural fluid.   ABDOMEN:   LIVER: There is no hepatic mass.   BILE DUCTS: There is no intrahepatic, common hepatic or common bile ductal  dilatation.   GALLBLADDER: The gallbladder is unremarkable.   PANCREAS: The pancreas is unremarkable.   SPLEEN: The spleen is unremarkable. There is no splenic mass or splenomegaly.   ADRENAL GLANDS: The adrenal glands are unremarkable.   KIDNEYS AND URETERS: The kidneys function symmetrically. The kidneys demonstrate no mass. There is no intrarenal calculus or hydronephrosis.     VESSELS: The abdominal and pelvic vessels are unremarkable.   PERITONEUM/RETROPERITONEUM/LYMPH NODES: There is no retroperitoneal or pelvic adenopathy.  There is no ascites.   ABDOMINAL WALL/BOWEL: There is thickening of the right and left rectus abdominis muscles. There is an abscess containing fluid and air in the anterior abdominal wall the in the midline between the rectus abdominis muscles. This measures 9.0 x 2.7 x 3.6 cm in longitudinal, transverse and AP dimensions respectively. There are multiple smaller dots of air in the subcutaneous fat. There is an abscess with an air-fluid level centrally in the pelvis measuring 9.1 x 7.9 x 3.8 cm in longitudinal, transverse and AP dimensions respectively. There are multiple dots of air in the peritoneal cavity around this larger abscess. Findings are consistent with a viscus perforation, likely extending into the anterior abdominal wall. There is a 2.0 x 0.8 cm abscess in the left side of the pelvis with smaller dots of air. There is a thickened loop of sigmoid colon and this is likely perforated sigmoid diverticulitis.   BONE AND SOFT TISSUE: There is no acute osseous finding.   There is no soft tissue abnormality.       1. Central intraabdominal abscess measuring 9.1 x 7.9 x 3.8 cm.. There are multiple dots of air around this larger abscess consistent with localized perforation and viscus perforation. This is likely perforated sigmoid diverticulitis. There are smaller abscesses in the abdomen and pelvis.   2. This abscess extends into the anterior abdominal wall between the rectus  "abdominis muscles.   MACRO: Julianne Meneses discussed the significance and urgency of this critical finding by secure chat with Dionisio Salinas and AILYN PANG on 4/24/2024 at 11:40 am.  (**-RCF-**) Findings:  See findings.   Signed by: Julianne Gideon 4/24/2024 11:41 AM Dictation workstation:   [id number]       Assessment/Plan   Principal Problem:    Bowel perforation (Multi)  Active Problems:    Intra-abdominal abscess (Multi)    Diverticulitis    Acute kidney injury (CMS-HCC)    Primary hypertension    James Ramirez is a 45 yo male who is admitted with bowel perforation and is POD #12 s/p ex lap, drainage of abdominal wall abscess, partial colon resection with diverting end colostomy with Dr Salinas. Intra-operative findings showed \" This appeared to be a colon cancer that had eroded into the abdominal wall and was densely adherent to the bladder.  Damage control operation was performed with washout placement of drains and diverting end colostomy.\" On exam, abdomen is soft minimally distended, wound vac is functioning appropriately with SSD in canister. Colostomy Beefy red, moist, producing brown liquid stool, + gas, well pouched.     Plan:   POD #12 s/p ex lap, partial colon resection and end colostomy post-op course has been complicated by ileus and HARLEY   - continue soft diet  - ensure shakes TID  - continue wound VAC  - DUSTY drains are out   - strict I&O  - enterostomal RN and dietician following     Renal:   HARLEY  - trend Cr - downtrending   - accurate I&O   - avoid nephrotoxic medications  - BP stable   - lytes normal   - nephrology following     ID: afebrile   Bowel perforation   - ID recs: continue unasyn and DC with augmentin 500 every day through 5/7.       Heme:   Anemia  - no s/s of bleeding    Dispo: continue DC planning, Discussed with Dr Barrios    I spent 35 minutes in the professional and overall care of this patient.      Rebecca Palacio, NAEEM-CNP      "

## 2024-05-06 NOTE — PROGRESS NOTES
"James Ramirez is a 44 y.o. male on day 12 of admission presenting with Bowel perforation (Multi).    Subjective   Feeling okay.  No complaints    Tolerating a diet      Objective     Physical Exam  Incisions clean    VAC placed lower aspect of the wound    Ostomy functioning  Last Recorded Vitals  Blood pressure (!) 151/97, pulse 74, temperature 36.4 °C (97.5 °F), temperature source Temporal, resp. rate 16, height 1.83 m (6' 0.05\"), weight 74.3 kg (163 lb 14.4 oz), SpO2 100%.  Intake/Output last 3 Shifts:  I/O last 3 completed shifts:  In: 3958.3 (53.2 mL/kg) [P.O.:1440; I.V.:2518.3 (33.9 mL/kg)]  Out: 4250 (57.2 mL/kg) [Urine:3950 (1.5 mL/kg/hr); Stool:300]  Weight: 74.3 kg     Relevant Results  Lab Results   Component Value Date    GLUCOSE 105 (H) 05/06/2024     05/06/2024    K 4.2 05/06/2024     05/06/2024    CO2 21 05/06/2024    ANIONGAP 16 05/06/2024    BUN 37 (H) 05/06/2024    CREATININE 5.90 (H) 05/06/2024    EGFR 11 (L) 05/06/2024    CALCIUM 7.6 (L) 05/06/2024    ALBUMIN 2.3 (L) 04/29/2024    ALKPHOS 56 04/28/2024    PROT 5.4 (L) 04/28/2024    AST 17 04/28/2024    BILITOT 0.5 04/28/2024    ALT 6 (L) 04/28/2024           Assessment/Plan   Principal Problem:    Bowel perforation (Multi)  Active Problems:    Intra-abdominal abscess (Multi)    Diverticulitis    Acute kidney injury (CMS-HCC)    Primary hypertension    Impression;     HARLEY showing slight improvement    Ostomy teaching.  He has emptied it    Discharge planningAyo Barrios MD      "

## 2024-05-06 NOTE — PROGRESS NOTES
Physical Therapy    Physical Therapy Treatment    Patient Name: James Ramirez  MRN: [id number]  Today's Date: 5/6/2024  Time Calculation  Start Time: 1340  Stop Time: 1359  Time Calculation (min): 19 min       Assessment/Plan   PT Assessment  End of Session Communication: Bedside nurse  Assessment Comment: Pt independent with all mobility tasks. Pt mobilizing with mobility aide and nursing staff. D/c from acute PT per conversation with supervising PT.  End of Session Patient Position: Up in chair, Alarm off, not on at start of session (cleared by RN)  PT Plan  Inpatient/Swing Bed or Outpatient: Inpatient  PT Plan  Treatment/Interventions: Bed mobility, Transfer training, Gait training, Stair training  PT Plan: Skilled PT  PT Frequency: 3 times per week  PT Discharge Recommendations: Low intensity level of continued care  PT Recommended Transfer Status: Assist x1  PT - OK to Discharge: Yes (per POC)      General Visit Information:   PT  Visit  PT Received On: 05/06/24  General  Reason for Referral: Pt admitted with abdominal pain, found to have an abscess and bowel perforation, now s/p ex lap.  Past Medical History Relevant to Rehab: History reviewed. No pertinent past medical history.    Prior to Session Communication: Bedside nurse  Patient Position Received: Up in room (CTA present)    Subjective   Precautions:  Precautions  Medical Precautions: Abdominal precautions, Fall precautions  Post-Surgical Precautions: Abdominal surgery precautions  Precautions Comment: Pt able to state 1/3 abdominal precautions, education provided  Vital Signs:       Objective   Pain:  Pain Assessment  Pain Assessment: 0-10  Pain Score: 0 - No pain  Cognition:     Postural Control:  Dynamic Standing Balance  Dynamic Standing-Balance Support: No upper extremity supported  Dynamic Standing-Balance: Lateral lean, Forward lean, Reaching for objects, Reaching across midline  Dynamic Standing-Comments: No LOB noted, education and mod VC to remain  within precautions, independent  Extremity/Trunk Assessments:    Activity Tolerance:     Treatments:        Bed Mobility  Bed Mobility: Yes  Bed Mobility 1  Bed Mobility 1: Supine to sitting, Sitting to supine, Log roll  Level of Assistance 1: Independent  Bed Mobility Comments 1: HOB flat. Practiced proper log roll technique    Ambulation/Gait Training  Ambulation/Gait Training Performed: Yes  Ambulation/Gait Training 1  Surface 1: Level tile  Device 1: No device  Gait Support Devices:  (none)  Assistance 1: Independent  Quality of Gait 1: Forward flexed posture  Comments/Distance (ft) 1: 485, 100 x 2 (Mod VC for upright posture, good carry over. No LOB noted)  Transfers  Transfer: Yes  Transfer 1  Technique 1: Sit to stand, Stand to sit  Transfer Device 1:  (no device)  Transfer Level of Assistance 1: Independent  Trials/Comments 1: no LOB noted, good eccentric control noted and safety mechanics    Stairs  Stairs: Yes  Stairs  Rails 1: None (Comment)  Curb Step 1: No  Device 1: No device  Assistance 1: Independent  Comment/Number of Steps 1: Pt ascended/descended 12 steps with no LOB. Minimal fatigue present, reciprocal gait pattern noted    Outcome Measures:  [facility] Basic Mobility  Turning from your back to your side while in a flat bed without using bedrails: None  Moving from lying on your back to sitting on the side of a flat bed without using bedrails: None  Moving to and from bed to chair (including a wheelchair): None  Standing up from a chair using your arms (e.g. wheelchair or bedside chair): None  To walk in hospital room: None  Climbing 3-5 steps with railing: None  Basic Mobility - Total Score: 24    Education Documentation  Precautions, taught by Rena Mena PTA at 5/6/2024  3:53 PM.  Learner: Patient  Readiness: Acceptance  Method: Explanation  Response: Verbalizes Understanding    Body Mechanics, taught by Rena Mena PTA at 5/6/2024  3:53 PM.  Learner: Patient  Readiness: Acceptance  Method:  Explanation  Response: Verbalizes Understanding    Mobility Training, taught by Rena Mena PTA at 5/6/2024  3:53 PM.  Learner: Patient  Readiness: Acceptance  Method: Explanation  Response: Verbalizes Understanding    Education Comments  No comments found.        OP EDUCATION:       Encounter Problems       Encounter Problems (Active)       Pain - Adult             Encounter Problems (Resolved)       Balance       STG - Maintains dynamic standing balance without upper extremity support x 10 minutes with indep (Met)       Start:  04/25/24    Expected End:  05/06/24    Resolved:  05/06/24    INTERVENTIONS:  1. Practice standing with minimal support.  2. Educate patient about standing tolerance.  3. Educate patient about independence with gait, transfers, and ADL's.  4. Educate patient about use of assistive device.  5. Educate patient about self-directed care.            Mobility       STG - Patient will ambulate >250' indep (Met)       Start:  04/25/24    Expected End:  05/06/24    Resolved:  05/06/24         STG - Patient will ascend and descend 3 steps without HR indep (Met)       Start:  04/25/24    Expected End:  05/06/24    Resolved:  05/06/24            PT Transfers       STG - Transfer from bed to chair with indep (Met)       Start:  04/25/24    Expected End:  05/06/24    Resolved:  05/06/24         STG - Patient to transfer to and from sit to supine via logroll mod indep (Met)       Start:  04/25/24    Expected End:  05/06/24    Resolved:  05/06/24         STG - Patient will transfer sit to and from stand indep (Met)       Start:  04/25/24    Expected End:  05/06/24    Resolved:  05/06/24

## 2024-05-06 NOTE — PROGRESS NOTES
"James Ramirez is a 44 y.o. male on day 11 of admission presenting with Bowel perforation (Multi).    Subjective   Patient tolerating p.o. diet.  Walking.  Feeling better       Objective     Physical Exam  Vitals reviewed.   Constitutional:       Appearance: Normal appearance.   HENT:      Head: Normocephalic and atraumatic.      Right Ear: Tympanic membrane, ear canal and external ear normal.      Left Ear: Tympanic membrane, ear canal and external ear normal.      Nose: Nose normal.      Mouth/Throat:      Pharynx: Oropharynx is clear.   Eyes:      Extraocular Movements: Extraocular movements intact.      Conjunctiva/sclera: Conjunctivae normal.      Pupils: Pupils are equal, round, and reactive to light.   Cardiovascular:      Rate and Rhythm: Normal rate and regular rhythm.      Pulses: Normal pulses.      Heart sounds: Normal heart sounds.   Pulmonary:      Effort: Pulmonary effort is normal.      Breath sounds: Normal breath sounds.   Abdominal:      General: Abdomen is flat. Bowel sounds are normal.      Palpations: Abdomen is soft.      Comments: Ileostomy pouch   Musculoskeletal:      Cervical back: Normal range of motion and neck supple.   Skin:     General: Skin is warm and dry.   Neurological:      General: No focal deficit present.      Mental Status: He is alert and oriented to person, place, and time.   Psychiatric:         Mood and Affect: Mood normal.         Last Recorded Vitals  Blood pressure (!) 142/92, pulse 72, temperature 37.1 °C (98.8 °F), temperature source Temporal, resp. rate 17, height 1.83 m (6' 0.05\"), weight 74.3 kg (163 lb 14.4 oz), SpO2 99%.  Intake/Output last 3 Shifts:  I/O last 3 completed shifts:  In: 2640 (35.5 mL/kg) [P.O.:1200; I.V.:1440 (19.4 mL/kg)]  Out: 2150 (28.9 mL/kg) [Urine:1900 (0.7 mL/kg/hr); Stool:250]  Weight: 74.3 kg     Relevant Results              Scheduled medications  acetaminophen, 650 mg, nasogastric tube, q6h   Or  acetaminophen, 650 mg, nasogastric tube, " q6h   Or  acetaminophen, 650 mg, rectal, q6h  amLODIPine, 10 mg, oral, Daily  ampicillin-sulbactam, 3 g, intravenous, q24h  diatrizoate iva-diatrizoat sod, 90 mL, oral, Once  docusate sodium, 100 mg, oral, BID  heparin (porcine), 5,000 Units, subcutaneous, q8h LILY  metoprolol tartrate, 25 mg, oral, BID  pantoprazole, 40 mg, intravenous, Daily before breakfast  phenylephrine, 1 spray, Each Nostril, Once  polyethylene glycol, 17 g, oral, Daily      Continuous medications  lactated Ringer's, 100 mL/hr, Last Rate: 100 mL/hr (05/05/24 2012)      PRN medications  PRN medications: benzocaine-menthol, [Held by provider] morphine, naloxone, ondansetron **OR** ondansetron  Results for orders placed or performed during the hospital encounter of 04/24/24 (from the past 24 hour(s))   CBC   Result Value Ref Range    WBC 9.6 4.4 - 11.3 x10*3/uL    nRBC 0.0 0.0 - 0.0 /100 WBCs    RBC 2.80 (L) 4.50 - 5.90 x10*6/uL    Hemoglobin 7.1 (L) 13.5 - 17.5 g/dL    Hematocrit 22.0 (L) 41.0 - 52.0 %    MCV 79 (L) 80 - 100 fL    MCH 25.4 (L) 26.0 - 34.0 pg    MCHC 32.3 32.0 - 36.0 g/dL    RDW 14.9 (H) 11.5 - 14.5 %    Platelets 524 (H) 150 - 450 x10*3/uL   Basic Metabolic Panel   Result Value Ref Range    Glucose 90 74 - 99 mg/dL    Sodium 134 (L) 136 - 145 mmol/L    Potassium 4.8 3.5 - 5.3 mmol/L    Chloride 106 98 - 107 mmol/L    Bicarbonate 20 (L) 21 - 32 mmol/L    Anion Gap 13 10 - 20 mmol/L    Urea Nitrogen 41 (H) 6 - 23 mg/dL    Creatinine 6.48 (H) 0.50 - 1.30 mg/dL    eGFR 10 (L) >60 mL/min/1.73m*2    Calcium 7.6 (L) 8.6 - 10.3 mg/dL     No results found.                Assessment/Plan   Principal Problem:    Bowel perforation (Multi)  Active Problems:    Intra-abdominal abscess (Multi)    Diverticulitis    Acute kidney injury (CMS-HCC)    Primary hypertension    Kidney function starting to improve  Continue IV fluids  Having bowel movements  Tolerating oral intake   IV Unasyn plan to switch to p.o. Augmentin per ID at  discharge  Continue wound care  Continue ostomy care blood pressure is running high  Blood pressure is doing little better with increased dose of Norvasc and Toprol dose      Discharge pending kidney function improvement       I spent  minutes in the professional and overall care of this patient.      Zahira Miller MD

## 2024-05-07 LAB
ABO GROUP (TYPE) IN BLOOD: NORMAL
ANION GAP SERPL CALC-SCNC: 15 MMOL/L (ref 10–20)
ANTIBODY SCREEN: NORMAL
BUN SERPL-MCNC: 37 MG/DL (ref 6–23)
CALCIUM SERPL-MCNC: 7.3 MG/DL (ref 8.6–10.3)
CHLORIDE SERPL-SCNC: 107 MMOL/L (ref 98–107)
CO2 SERPL-SCNC: 22 MMOL/L (ref 21–32)
CREAT SERPL-MCNC: 5.28 MG/DL (ref 0.5–1.3)
EGFRCR SERPLBLD CKD-EPI 2021: 13 ML/MIN/1.73M*2
ERYTHROCYTE [DISTWIDTH] IN BLOOD BY AUTOMATED COUNT: 15.6 % (ref 11.5–14.5)
GLUCOSE SERPL-MCNC: 131 MG/DL (ref 74–99)
HCT VFR BLD AUTO: 23.1 % (ref 41–52)
HGB BLD-MCNC: 7 G/DL (ref 13.5–17.5)
MCH RBC QN AUTO: 24.8 PG (ref 26–34)
MCHC RBC AUTO-ENTMCNC: 30.3 G/DL (ref 32–36)
MCV RBC AUTO: 82 FL (ref 80–100)
NRBC BLD-RTO: 0 /100 WBCS (ref 0–0)
PLATELET # BLD AUTO: 524 X10*3/UL (ref 150–450)
POTASSIUM SERPL-SCNC: 4 MMOL/L (ref 3.5–5.3)
RBC # BLD AUTO: 2.82 X10*6/UL (ref 4.5–5.9)
RH FACTOR (ANTIGEN D): NORMAL
SODIUM SERPL-SCNC: 140 MMOL/L (ref 136–145)
WBC # BLD AUTO: 8.4 X10*3/UL (ref 4.4–11.3)

## 2024-05-07 PROCEDURE — 2500000001 HC RX 250 WO HCPCS SELF ADMINISTERED DRUGS (ALT 637 FOR MEDICARE OP): Performed by: INTERNAL MEDICINE

## 2024-05-07 PROCEDURE — 36415 COLL VENOUS BLD VENIPUNCTURE: CPT | Performed by: INTERNAL MEDICINE

## 2024-05-07 PROCEDURE — 86900 BLOOD TYPING SEROLOGIC ABO: CPT | Performed by: INTERNAL MEDICINE

## 2024-05-07 PROCEDURE — 86920 COMPATIBILITY TEST SPIN: CPT

## 2024-05-07 PROCEDURE — 85027 COMPLETE CBC AUTOMATED: CPT | Performed by: INTERNAL MEDICINE

## 2024-05-07 PROCEDURE — 2500000004 HC RX 250 GENERAL PHARMACY W/ HCPCS (ALT 636 FOR OP/ED): Performed by: INTERNAL MEDICINE

## 2024-05-07 PROCEDURE — 86901 BLOOD TYPING SEROLOGIC RH(D): CPT | Performed by: INTERNAL MEDICINE

## 2024-05-07 PROCEDURE — 99233 SBSQ HOSP IP/OBS HIGH 50: CPT

## 2024-05-07 PROCEDURE — 1100000001 HC PRIVATE ROOM DAILY

## 2024-05-07 PROCEDURE — 80048 BASIC METABOLIC PNL TOTAL CA: CPT | Performed by: INTERNAL MEDICINE

## 2024-05-07 PROCEDURE — C9113 INJ PANTOPRAZOLE SODIUM, VIA: HCPCS | Performed by: INTERNAL MEDICINE

## 2024-05-07 PROCEDURE — 99233 SBSQ HOSP IP/OBS HIGH 50: CPT | Performed by: INTERNAL MEDICINE

## 2024-05-07 RX ADMIN — SODIUM CHLORIDE, POTASSIUM CHLORIDE, SODIUM LACTATE AND CALCIUM CHLORIDE 100 ML/HR: 600; 310; 30; 20 INJECTION, SOLUTION INTRAVENOUS at 18:50

## 2024-05-07 RX ADMIN — HEPARIN SODIUM 5000 UNITS: 5000 INJECTION INTRAVENOUS; SUBCUTANEOUS at 13:35

## 2024-05-07 RX ADMIN — HEPARIN SODIUM 5000 UNITS: 5000 INJECTION INTRAVENOUS; SUBCUTANEOUS at 20:35

## 2024-05-07 RX ADMIN — PANTOPRAZOLE SODIUM 40 MG: 40 INJECTION, POWDER, FOR SOLUTION INTRAVENOUS at 07:03

## 2024-05-07 RX ADMIN — AMLODIPINE BESYLATE 10 MG: 10 TABLET ORAL at 08:19

## 2024-05-07 RX ADMIN — SODIUM CHLORIDE, POTASSIUM CHLORIDE, SODIUM LACTATE AND CALCIUM CHLORIDE 100 ML/HR: 600; 310; 30; 20 INJECTION, SOLUTION INTRAVENOUS at 08:19

## 2024-05-07 RX ADMIN — METOPROLOL TARTRATE 25 MG: 25 TABLET, FILM COATED ORAL at 20:36

## 2024-05-07 RX ADMIN — AMPICILLIN SODIUM AND SULBACTAM SODIUM 3 G: 2; 1 INJECTION, POWDER, FOR SOLUTION INTRAMUSCULAR; INTRAVENOUS at 10:14

## 2024-05-07 RX ADMIN — METOPROLOL TARTRATE 25 MG: 25 TABLET, FILM COATED ORAL at 08:19

## 2024-05-07 RX ADMIN — HEPARIN SODIUM 5000 UNITS: 5000 INJECTION INTRAVENOUS; SUBCUTANEOUS at 07:04

## 2024-05-07 ASSESSMENT — COGNITIVE AND FUNCTIONAL STATUS - GENERAL
MOBILITY SCORE: 24
DAILY ACTIVITIY SCORE: 24
DAILY ACTIVITIY SCORE: 24
MOBILITY SCORE: 24

## 2024-05-07 ASSESSMENT — PAIN SCALES - GENERAL
PAINLEVEL_OUTOF10: 0 - NO PAIN
PAINLEVEL_OUTOF10: 0 - NO PAIN

## 2024-05-07 ASSESSMENT — PAIN - FUNCTIONAL ASSESSMENT: PAIN_FUNCTIONAL_ASSESSMENT: 0-10

## 2024-05-07 NOTE — PROGRESS NOTES
"James Ramirez is a 44 y.o. male on day 13 of admission presenting with Bowel perforation (Multi).    Subjective   Patient tolerating p.o. diet.  Walking.  Feeling better       Objective     Physical Exam  Vitals reviewed.   Constitutional:       Appearance: Normal appearance.   HENT:      Head: Normocephalic and atraumatic.      Right Ear: Tympanic membrane, ear canal and external ear normal.      Left Ear: Tympanic membrane, ear canal and external ear normal.      Nose: Nose normal.      Mouth/Throat:      Pharynx: Oropharynx is clear.   Eyes:      Extraocular Movements: Extraocular movements intact.      Conjunctiva/sclera: Conjunctivae normal.      Pupils: Pupils are equal, round, and reactive to light.   Cardiovascular:      Rate and Rhythm: Normal rate and regular rhythm.      Pulses: Normal pulses.      Heart sounds: Normal heart sounds.   Pulmonary:      Effort: Pulmonary effort is normal.      Breath sounds: Normal breath sounds.   Abdominal:      General: Abdomen is flat. Bowel sounds are normal.      Palpations: Abdomen is soft.      Comments: Ileostomy pouch   Musculoskeletal:      Cervical back: Normal range of motion and neck supple.   Skin:     General: Skin is warm and dry.   Neurological:      General: No focal deficit present.      Mental Status: He is alert and oriented to person, place, and time.   Psychiatric:         Mood and Affect: Mood normal.         Last Recorded Vitals  Blood pressure 135/86, pulse 79, temperature 37.2 °C (99 °F), temperature source Temporal, resp. rate 18, height 1.83 m (6' 0.05\"), weight 74.3 kg (163 lb 14.4 oz), SpO2 100%.  Intake/Output last 3 Shifts:  I/O last 3 completed shifts:  In: 3153.3 (42.4 mL/kg) [P.O.:240; I.V.:2913.3 (39.2 mL/kg)]  Out: 5050 (67.9 mL/kg) [Urine:4950 (1.8 mL/kg/hr); Stool:100]  Weight: 74.3 kg     Relevant Results              Scheduled medications  acetaminophen, 650 mg, nasogastric tube, q6h   Or  acetaminophen, 650 mg, nasogastric tube, " q6h   Or  acetaminophen, 650 mg, rectal, q6h  amLODIPine, 10 mg, oral, Daily  ampicillin-sulbactam, 3 g, intravenous, q24h  diatrizoate iva-diatrizoat sod, 90 mL, oral, Once  docusate sodium, 100 mg, oral, BID  heparin (porcine), 5,000 Units, subcutaneous, q8h LILY  metoprolol tartrate, 25 mg, oral, BID  pantoprazole, 40 mg, intravenous, Daily before breakfast  phenylephrine, 1 spray, Each Nostril, Once  polyethylene glycol, 17 g, oral, Daily      Continuous medications  lactated Ringer's, 100 mL/hr, Last Rate: 100 mL/hr (05/07/24 1232)      PRN medications  PRN medications: benzocaine-menthol, [Held by provider] morphine, naloxone, ondansetron **OR** ondansetron  Results for orders placed or performed during the hospital encounter of 04/24/24 (from the past 24 hour(s))   CBC   Result Value Ref Range    WBC 8.4 4.4 - 11.3 x10*3/uL    nRBC 0.0 0.0 - 0.0 /100 WBCs    RBC 2.82 (L) 4.50 - 5.90 x10*6/uL    Hemoglobin 7.0 (L) 13.5 - 17.5 g/dL    Hematocrit 23.1 (L) 41.0 - 52.0 %    MCV 82 80 - 100 fL    MCH 24.8 (L) 26.0 - 34.0 pg    MCHC 30.3 (L) 32.0 - 36.0 g/dL    RDW 15.6 (H) 11.5 - 14.5 %    Platelets 524 (H) 150 - 450 x10*3/uL   Basic Metabolic Panel   Result Value Ref Range    Glucose 131 (H) 74 - 99 mg/dL    Sodium 140 136 - 145 mmol/L    Potassium 4.0 3.5 - 5.3 mmol/L    Chloride 107 98 - 107 mmol/L    Bicarbonate 22 21 - 32 mmol/L    Anion Gap 15 10 - 20 mmol/L    Urea Nitrogen 37 (H) 6 - 23 mg/dL    Creatinine 5.28 (H) 0.50 - 1.30 mg/dL    eGFR 13 (L) >60 mL/min/1.73m*2    Calcium 7.3 (L) 8.6 - 10.3 mg/dL     No results found.                Assessment/Plan   Principal Problem:    Bowel perforation (Multi)  Active Problems:    Intra-abdominal abscess (Multi)    Diverticulitis    Acute kidney injury (CMS-HCC)    Primary hypertension    Kidney function starting to improve  Continue IV fluids  Having bowel movements  Tolerating oral intake   IV Unasyn plan to switch to p.o. Augmentin per ID at discharge  Continue  wound care  Continue ostomy care blood pressure is running high  Blood pressure is doing little better with increased dose of Norvasc and Toprol dose  Transfuse 1 unit prbc    Discharge pending kidney function improvement        I spent  minutes in the professional and overall care of this patient.      Zahira Miller MD

## 2024-05-07 NOTE — PROGRESS NOTES
James Ramirez is a 44 y.o. male on day 13 of admission presenting with Bowel perforation (Multi).      Subjective   James Ramirez is a 44-year-old male who presented on 4/24 with abdominal discomfort who was found to have an intra-abdominal abscess with localized and viscus perforation.  Smaller abscesses were noted in the abdomen and pelvis extending into the anterior abdominal wall.  These findings were noted on contrast CT imaging.  He had normal renal function upon presentation.  He went for exploratory laparotomy with drainage of the abdominal wall abscess, partial colon resection with diverting end colostomy on 4/24. By 4/27 we noted worsening renal function. Nephrology was consulted for acute kidney injury.     Seen and examined.   NAHUN. He does not offer specific complaints.  Chart/labs/meds/notes/imaging/VS reviewed.       Objective          Vitals 24HR  Heart Rate:  [79-88]   Temp:  [36.6 °C (97.8 °F)-37.2 °C (99 °F)]   Resp:  [16-18]   BP: (135-144)/(86-87)   SpO2:  [100 %]     Intake/Output last 3 Shifts:    Intake/Output Summary (Last 24 hours) at 5/7/2024 1439  Last data filed at 5/7/2024 1232  Gross per 24 hour   Intake 2275 ml   Output 2350 ml   Net -75 ml       Physical Exam  Constitutional:       Appearance: Normal appearance.  In no acute distress.  Eyes:      Conjunctiva/sclera: Conjunctivae normal.   Cardiovascular:      Rate and Rhythm: Normal rate.  Normal S1 and S2.     Pulses: Normal pulses.   Pulmonary:      Effort: Pulmonary effort is normal.   Abdominal:      Comments: Wound vac and colostomy noted producing brown stool  Genitourinary:     Comments: No Batista  Musculoskeletal:         General: Normal range of motion.  No joint swelling.  Skin:     General: Skin is warm.  No rashes or lesions to exposed skin.  Neurological:      Mental Status: He is oriented to person, place, and time.  Nonfocal.  Psychiatric:         Mood and Affect: Mood normal.         Behavior: Behavior normal.     Scheduled  Medications  acetaminophen, 650 mg, nasogastric tube, q6h   Or  acetaminophen, 650 mg, nasogastric tube, q6h   Or  acetaminophen, 650 mg, rectal, q6h  amLODIPine, 10 mg, oral, Daily  ampicillin-sulbactam, 3 g, intravenous, q24h  diatrizoate iva-diatrizoat sod, 90 mL, oral, Once  docusate sodium, 100 mg, oral, BID  heparin (porcine), 5,000 Units, subcutaneous, q8h LILY  metoprolol tartrate, 25 mg, oral, BID  pantoprazole, 40 mg, intravenous, Daily before breakfast  phenylephrine, 1 spray, Each Nostril, Once  polyethylene glycol, 17 g, oral, Daily      Continuous medications  lactated Ringer's, 100 mL/hr, Last Rate: 100 mL/hr (05/07/24 1232)        PRN medications: benzocaine-menthol, [Held by provider] morphine, naloxone, ondansetron **OR** ondansetron     Relevant Results  Results from last 7 days   Lab Units 05/07/24  0611 05/06/24  0624 05/05/24  0459   WBC AUTO x10*3/uL 8.4 8.3 9.6   HEMOGLOBIN g/dL 7.0* 8.2* 7.1*   HEMATOCRIT % 23.1* 26.2* 22.0*   PLATELETS AUTO x10*3/uL 524* 620* 524*     Results from last 7 days   Lab Units 05/07/24  0611 05/06/24  0624 05/05/24  0459   SODIUM mmol/L 140 139 134*   POTASSIUM mmol/L 4.0 4.2 4.8   CHLORIDE mmol/L 107 106 106   CO2 mmol/L 22 21 20*   BUN mg/dL 37* 37* 41*   CREATININE mg/dL 5.28* 5.90* 6.48*   GLUCOSE mg/dL 131* 105* 90   CALCIUM mg/dL 7.3* 7.6* 7.6*       XR chest abdomen for OG NG placement   Final Result   1.  Enteric tube terminates in the left upper quadrant with side hole   below the GE junction. Gaseous distended loops of bowel in the   visualized upper abdomen may relate to ileus. Attention on follow-up   is advised.   2.  No acute cardiopulmonary process.        MACRO:   None        Signed by: Randolph Gtz 5/1/2024 1:26 AM   Dictation workstation:   [id number]      XR abdomen 1 view   Final Result   Positive contrast material in nondilated loops of bowel throughout   the abdomen.        MACRO:   None.        Signed by: Armida Helm 4/30/2024 11:02 AM    Dictation workstation:   [id number]      XR abdomen 1 view   Final Result   Postsurgical changes and support devices as above. Multiple distended   loops of large and small bowel.        MACRO:   None.        Signed by: Armida Helm 4/29/2024 1:24 PM   Dictation workstation:   [id number]      US renal complete   Final Result   1.  No hydronephrosis or hydroureter bilaterally.   2. Nonobstructive calculus in the left kidney.   3.  Increased cortical echogenicity within the kidneys bilaterally,   which can be seen with medical renal disease.        Signed by: Ciro Patel 4/28/2024 5:50 PM   Dictation workstation:   [id number]      XR chest 2 views   Final Result   Left basilar infiltrate or atelectasis. Small effusion.        MACRO:   none        Signed by: Carissa Kim 4/29/2024 7:14 AM   Dictation workstation:   [id number]      XR chest 1 view   Final Result   Hypoventilatory exam.        Mild cardiomegaly.        NG tube in place as described.        MACRO:   None        Signed by: Reji Senior 4/26/2024 1:45 PM   Dictation workstation:   [id number]      XR chest 1 view   Final Result   1. No acute cardiopulmonary process.        MACRO:   None.        Signed by: Joleen Pascual 4/24/2024 11:59 AM   Dictation workstation:   [id number]      CT abdomen pelvis w IV contrast   Final Result   1. Central intraabdominal abscess measuring 9.1 x 7.9 x 3.8 cm..   There are multiple dots of air around this larger abscess consistent   with localized perforation and viscus perforation. This is likely   perforated sigmoid diverticulitis. There are smaller abscesses in the   abdomen and pelvis.        2. This abscess extends into the anterior abdominal wall between the   rectus abdominis muscles.        MACRO:   Julianne Meneses discussed the significance and urgency of this   critical finding by secure chat with Dionisio Salinas and AILYN PANG   on 4/24/2024 at 11:40 am.  (**-RCF-**) Findings:  See findings.         Signed by: Julianne Meneses 4/24/2024 11:41 AM   Dictation workstation:   [id number]               Assessment/Plan      James Ramirez is a 44-year-old male who presented on 4/24 with abdominal discomfort who was found to have an intra-abdominal abscess with localized and viscus perforation.  Smaller abscesses were noted in the abdomen and pelvis extending into the anterior abdominal wall.  These findings were noted on contrast CT imaging.  He had normal renal function upon presentation.  He went for exploratory laparotomy with drainage of the abdominal wall abscess, partial colon resection with diverting end colostomy on 4/24.  By 4/27 we noted worsening renal function.  Nephrology was consulted for acute kidney injury.    Mr. Ramirez creatinine peaked at 7.5 mg a deciliter by 5/25.  Fortunately he did not require renal replacement therapy.  His creatinine is slowly improving with his current EGFR being 13%.  Acute kidney injury is due to acute tubular necrosis.  He was noted to have elevated vancomycin levels.  He did receive iodinated contrast with his creatinine rise potentially around the 48-hour rg and may be a potential culprit.  He had nearly a 4 g drop in his hemoglobin in the postoperative period with likely a hemodynamic component albeit no true hypotension documented.  He is no longer on antimicrobial coverage.  Blood pressures are holding steady.  He is voiding on his own and is nonoliguric.  There has been no indication for RRT. TP:Cr ratio with proteinuria is in the setting of ATN. This can be repeated as his renal function improves. He understands that he must avoid anti-inflammatories upon discharge.  He will need very close renal follow-up with an RFP within 48 hours of DC as well as a weekly RFP. I will add on iron stores and ferritin in anticipation for iron/EPO.  He may benefit from a transfusion prior to discharge.  There is no renal indication for IV fluid.  This may also be causing  dilution.  Otherwise, no renal barriers to discharge.  We will schedule him outpatient to be seen in the office - [phone].    Principal Problem:    Bowel perforation (Multi)  Active Problems:    Intra-abdominal abscess (Multi)    Diverticulitis    Acute kidney injury (CMS-HCC)    Primary hypertension         I spent 50 minutes in the professional and overall care of this patient.      Eulogio Gandhi, DO

## 2024-05-07 NOTE — CARE PLAN
The patient's goals for the shift include      The clinical goals for the shift include Patient will remain safe and HD stable      Problem: Nutrition  Goal: Less than 5 days NPO/clear liquids  5/7/2024 0749 by Reji Strickland RN  Outcome: Progressing  5/7/2024 0121 by Reji Strickland RN  Outcome: Progressing  Goal: Oral intake greater than 50%  5/7/2024 0749 by Reji Strickland RN  Outcome: Progressing  5/7/2024 0121 by Reji Strickland RN  Outcome: Progressing  Goal: Oral intake greater 75%  5/7/2024 0749 by Reji Strickland RN  Outcome: Progressing  5/7/2024 0121 by Reji Strickland RN  Outcome: Progressing  Goal: Consume prescribed supplement  5/7/2024 0749 by Reji Strickland RN  Outcome: Progressing  5/7/2024 0121 by Reji Strickland RN  Outcome: Progressing  Goal: Adequate PO fluid intake  5/7/2024 0749 by Reji Strickland RN  Outcome: Progressing  5/7/2024 0121 by Reji Strickland RN  Outcome: Progressing  Goal: Nutrition support goals are met within 48 hrs  5/7/2024 0749 by Reji Strickland RN  Outcome: Progressing  5/7/2024 0121 by Reji Strickland RN  Outcome: Progressing  Goal: Nutrition support is meeting 75% of nutrient needs  5/7/2024 0749 by Reji Strickland RN  Outcome: Progressing  5/7/2024 0121 by Reji Strickland RN  Outcome: Progressing  Goal: Tube feed tolerance  5/7/2024 0749 by Reji Strickland RN  Outcome: Progressing  5/7/2024 0121 by Reji Strickland RN  Outcome: Progressing  Goal: BG  mg/dL  5/7/2024 0749 by Reji Strickland RN  Outcome: Progressing  5/7/2024 0121 by Reji Strickland RN  Outcome: Progressing  Goal: Lab values WNL  5/7/2024 0749 by Reji Strickland RN  Outcome: Progressing  5/7/2024 0121 by Reji Kabongo, RN  Outcome: Progressing  Goal: Electrolytes WNL  5/7/2024 0749 by Reji Strickland RN  Outcome: Progressing  5/7/2024 0121 by Reji Strickland RN  Outcome: Progressing  Goal: Promote healing  5/7/2024 0749 by Reji Strickland RN  Outcome: Progressing  5/7/2024 0121 by Reji Strickland RN  Outcome:  Progressing  Goal: Maintain stable weight  5/7/2024 0749 by Reji Strickland RN  Outcome: Progressing  5/7/2024 0121 by Reji Strickland RN  Outcome: Progressing  Goal: Reduce weight from edema/fluid  5/7/2024 0749 by Reji Strickland RN  Outcome: Progressing  5/7/2024 0121 by Reji Strickland RN  Outcome: Progressing  Goal: Gradual weight gain  5/7/2024 0749 by Reji Strickland RN  Outcome: Progressing  5/7/2024 0121 by Reji Strickland RN  Outcome: Progressing  Goal: Improve ostomy output  5/7/2024 0749 by Reji Strickland RN  Outcome: Progressing  5/7/2024 0121 by Reji Strickland RN  Outcome: Progressing     Problem: Pain - Adult  Goal: Verbalizes/displays adequate comfort level or baseline comfort level  5/7/2024 0749 by Reji Strickland RN  Outcome: Progressing  5/7/2024 0121 by Reji Strickland RN  Outcome: Progressing     Problem: Safety - Adult  Goal: Free from fall injury  5/7/2024 0749 by Reji Strickland RN  Outcome: Progressing  5/7/2024 0121 by Reji Strickland RN  Outcome: Progressing

## 2024-05-07 NOTE — PROGRESS NOTES
"James Ramirez is a 44 y.o. male on day 13 of admission presenting with Bowel perforation (Multi) s/p Exploratory laparotomy, drainage of abdominal wall abscess, partial colon resection with diverting end colostomy on 4/24. Findings significant for what appeared to be colon cancer that had eroded into the abdominal wall and was densely adherent to the bladder.  Damage control operation was performed with washout placement of drains and diverting end colostomy.     Subjective   Patient doing well today, tolerating PO intake without N/V, appetite is improving, ostomy functioning.       Objective   PE:  Constitutional: A&Ox3, calm and cooperative  Eyes: clear sclera   ENMT: Moist mucous membranes  Head/Neck: Neck supple  Cardiovascular: Normal rate and regular rhythm.  Respiratory/Thorax:  Good symmetric chest expansion.   Gastrointestinal: Abdomen slightly distended, soft, appropriately tender, no peritoneal signs, laparotomy sites c/d/i, well approximate with Dermabond, no erythema or drainage. Beefy red, moist, producing liquid stool, well pouched. Wound vac to incision functioning.  Genitourinary: Voiding independently   Musculoskeletal: ROM intact  Extremities: No peripheral edema  Neurological: A&Ox3  Psychological: Appropriate mood and behavior  Skin: Warm and dry      Last Recorded Vitals  Blood pressure 135/86, pulse 79, temperature 37.2 °C (99 °F), temperature source Temporal, resp. rate 18, height 1.83 m (6' 0.05\"), weight 74.3 kg (163 lb 14.4 oz), SpO2 100%.  Intake/Output last 3 Shifts:  I/O last 3 completed shifts:  In: 3153.3 (42.4 mL/kg) [P.O.:240; I.V.:2913.3 (39.2 mL/kg)]  Out: 5050 (67.9 mL/kg) [Urine:4950 (1.8 mL/kg/hr); Stool:100]  Weight: 74.3 kg     Relevant Results  Results for orders placed or performed during the hospital encounter of 04/24/24 (from the past 24 hour(s))   CBC   Result Value Ref Range    WBC 8.4 4.4 - 11.3 x10*3/uL    nRBC 0.0 0.0 - 0.0 /100 WBCs    RBC 2.82 (L) 4.50 - 5.90 " x10*6/uL    Hemoglobin 7.0 (L) 13.5 - 17.5 g/dL    Hematocrit 23.1 (L) 41.0 - 52.0 %    MCV 82 80 - 100 fL    MCH 24.8 (L) 26.0 - 34.0 pg    MCHC 30.3 (L) 32.0 - 36.0 g/dL    RDW 15.6 (H) 11.5 - 14.5 %    Platelets 524 (H) 150 - 450 x10*3/uL   Basic Metabolic Panel   Result Value Ref Range    Glucose 131 (H) 74 - 99 mg/dL    Sodium 140 136 - 145 mmol/L    Potassium 4.0 3.5 - 5.3 mmol/L    Chloride 107 98 - 107 mmol/L    Bicarbonate 22 21 - 32 mmol/L    Anion Gap 15 10 - 20 mmol/L    Urea Nitrogen 37 (H) 6 - 23 mg/dL    Creatinine 5.28 (H) 0.50 - 1.30 mg/dL    eGFR 13 (L) >60 mL/min/1.73m*2    Calcium 7.3 (L) 8.6 - 10.3 mg/dL     XR chest abdomen for OG NG placement   Final Result   1.  Enteric tube terminates in the left upper quadrant with side hole   below the GE junction. Gaseous distended loops of bowel in the   visualized upper abdomen may relate to ileus. Attention on follow-up   is advised.   2.  No acute cardiopulmonary process.        MACRO:   None        Signed by: Randolph Gtz 5/1/2024 1:26 AM   Dictation workstation:   [id number]      XR abdomen 1 view   Final Result   Positive contrast material in nondilated loops of bowel throughout   the abdomen.        MACRO:   None.        Signed by: Armida Helm 4/30/2024 11:02 AM   Dictation workstation:   [id number]      XR abdomen 1 view   Final Result   Postsurgical changes and support devices as above. Multiple distended   loops of large and small bowel.        MACRO:   None.        Signed by: Armida Helm 4/29/2024 1:24 PM   Dictation workstation:   [id number]      US renal complete   Final Result   1.  No hydronephrosis or hydroureter bilaterally.   2. Nonobstructive calculus in the left kidney.   3.  Increased cortical echogenicity within the kidneys bilaterally,   which can be seen with medical renal disease.        Signed by: Ciro Patel 4/28/2024 5:50 PM   Dictation workstation:   [id number]      XR chest 2 views   Final Result   Left basilar  infiltrate or atelectasis. Small effusion.        MACRO:   none        Signed by: Carissa Kim 4/29/2024 7:14 AM   Dictation workstation:   [id number]      XR chest 1 view   Final Result   Hypoventilatory exam.        Mild cardiomegaly.        NG tube in place as described.        MACRO:   None        Signed by: Reji Senior 4/26/2024 1:45 PM   Dictation workstation:   [id number]      XR chest 1 view   Final Result   1. No acute cardiopulmonary process.        MACRO:   None.        Signed by: Joleen Pascual 4/24/2024 11:59 AM   Dictation workstation:   [id number]      CT abdomen pelvis w IV contrast   Final Result   1. Central intraabdominal abscess measuring 9.1 x 7.9 x 3.8 cm..   There are multiple dots of air around this larger abscess consistent   with localized perforation and viscus perforation. This is likely   perforated sigmoid diverticulitis. There are smaller abscesses in the   abdomen and pelvis.        2. This abscess extends into the anterior abdominal wall between the   rectus abdominis muscles.        MACRO:   Julianne Meneses discussed the significance and urgency of this   critical finding by secure chat with Dionisio Salinas and AILYN PANG   on 4/24/2024 at 11:40 am.  (**-RCF-**) Findings:  See findings.        Signed by: Julianne Meneses 4/24/2024 11:41 AM   Dictation workstation:   [id number]             Malnutrition Diagnosis Status: New  Malnutrition Diagnosis: Severe malnutrition related to chronic disease or condition  As Evidenced by: > 2% weight loss in 1 week, prolonged poor intake of < 50% of estimated energy needs in > 5 days, moderate subcutaneous fat loss, moderate to severe muscle wasting and trace fluid accumulation present  I agree with the dietitian's malnutrition diagnosis.      Assessment/Plan   Principal Problem:    Bowel perforation (Multi)  Active Problems:    Intra-abdominal abscess (Multi)    Diverticulitis    Acute kidney injury (CMS-HCC)    Primary  hypertension    James Ramirez is a 44 y.o. male on day 13 of admission presenting with Bowel perforation (Multi) s/p Exploratory laparotomy, drainage of abdominal wall abscess, partial colon resection with diverting end colostomy on 4/24. Findings significant for what appeared to be colon cancer that had eroded into the abdominal wall and was densely adherent to the bladder.  Damage control operation was performed with washout placement of drains and diverting end colostomy.     Plan:  - continue soft/low fiber diet  - nutritional supplements- ensure plus high protein   - PRN pain medications  - PRN antiemetics  - Daily PPI  - Bowel regimen: Miralax/colace   - DVT proph: SCD's/OOB/Heparin   - Maintain wound vac  - monitor I&O's, trend lab work    Dispo: Discussed with Dr. Salinas, ready to be discharged from surgical prospective, needs to go home with wound vac- awaiting approval. Will defer discharged to the medicine team.        I spent 45 minutes in the professional and overall care of this patient.      Rayne Mccoy, APRN-CNP

## 2024-05-08 LAB
ANION GAP SERPL CALC-SCNC: 17 MMOL/L (ref 10–20)
BUN SERPL-MCNC: 32 MG/DL (ref 6–23)
CALCIUM SERPL-MCNC: 7.5 MG/DL (ref 8.6–10.3)
CHLORIDE SERPL-SCNC: 105 MMOL/L (ref 98–107)
CO2 SERPL-SCNC: 21 MMOL/L (ref 21–32)
CREAT SERPL-MCNC: 4.77 MG/DL (ref 0.5–1.3)
EGFRCR SERPLBLD CKD-EPI 2021: 15 ML/MIN/1.73M*2
ERYTHROCYTE [DISTWIDTH] IN BLOOD BY AUTOMATED COUNT: 15.9 % (ref 11.5–14.5)
FERRITIN SERPL-MCNC: 268 NG/ML (ref 20–300)
GLUCOSE SERPL-MCNC: 119 MG/DL (ref 74–99)
HCT VFR BLD AUTO: 24 % (ref 41–52)
HGB BLD-MCNC: 7.4 G/DL (ref 13.5–17.5)
HOLD SPECIMEN: NORMAL
IRON SATN MFR SERPL: 6 % (ref 25–45)
IRON SERPL-MCNC: 12 UG/DL (ref 35–150)
LABORATORY COMMENT REPORT: NORMAL
MCH RBC QN AUTO: 25.3 PG (ref 26–34)
MCHC RBC AUTO-ENTMCNC: 30.8 G/DL (ref 32–36)
MCV RBC AUTO: 82 FL (ref 80–100)
NRBC BLD-RTO: 0 /100 WBCS (ref 0–0)
PATH REPORT.FINAL DX SPEC: NORMAL
PATH REPORT.GROSS SPEC: NORMAL
PATH REPORT.RELEVANT HX SPEC: NORMAL
PATH REPORT.TOTAL CANCER: NORMAL
PLATELET # BLD AUTO: 495 X10*3/UL (ref 150–450)
POTASSIUM SERPL-SCNC: 3.6 MMOL/L (ref 3.5–5.3)
RBC # BLD AUTO: 2.93 X10*6/UL (ref 4.5–5.9)
SODIUM SERPL-SCNC: 139 MMOL/L (ref 136–145)
TIBC SERPL-MCNC: 196 UG/DL (ref 240–445)
UIBC SERPL-MCNC: 184 UG/DL (ref 110–370)
WBC # BLD AUTO: 6.6 X10*3/UL (ref 4.4–11.3)

## 2024-05-08 PROCEDURE — 2500000004 HC RX 250 GENERAL PHARMACY W/ HCPCS (ALT 636 FOR OP/ED): Performed by: INTERNAL MEDICINE

## 2024-05-08 PROCEDURE — 83550 IRON BINDING TEST: CPT | Performed by: INTERNAL MEDICINE

## 2024-05-08 PROCEDURE — 83540 ASSAY OF IRON: CPT | Performed by: INTERNAL MEDICINE

## 2024-05-08 PROCEDURE — 82728 ASSAY OF FERRITIN: CPT | Mod: AHULAB | Performed by: INTERNAL MEDICINE

## 2024-05-08 PROCEDURE — 2500000001 HC RX 250 WO HCPCS SELF ADMINISTERED DRUGS (ALT 637 FOR MEDICARE OP): Performed by: INTERNAL MEDICINE

## 2024-05-08 PROCEDURE — 85027 COMPLETE CBC AUTOMATED: CPT | Performed by: FAMILY MEDICINE

## 2024-05-08 PROCEDURE — 1100000001 HC PRIVATE ROOM DAILY

## 2024-05-08 PROCEDURE — C9113 INJ PANTOPRAZOLE SODIUM, VIA: HCPCS | Performed by: INTERNAL MEDICINE

## 2024-05-08 PROCEDURE — 36415 COLL VENOUS BLD VENIPUNCTURE: CPT | Performed by: INTERNAL MEDICINE

## 2024-05-08 PROCEDURE — 36415 COLL VENOUS BLD VENIPUNCTURE: CPT | Performed by: FAMILY MEDICINE

## 2024-05-08 PROCEDURE — 80048 BASIC METABOLIC PNL TOTAL CA: CPT | Performed by: FAMILY MEDICINE

## 2024-05-08 RX ADMIN — METOPROLOL TARTRATE 25 MG: 25 TABLET, FILM COATED ORAL at 21:38

## 2024-05-08 RX ADMIN — SODIUM CHLORIDE, POTASSIUM CHLORIDE, SODIUM LACTATE AND CALCIUM CHLORIDE 100 ML/HR: 600; 310; 30; 20 INJECTION, SOLUTION INTRAVENOUS at 06:14

## 2024-05-08 RX ADMIN — HEPARIN SODIUM 5000 UNITS: 5000 INJECTION INTRAVENOUS; SUBCUTANEOUS at 06:05

## 2024-05-08 RX ADMIN — AMPICILLIN SODIUM AND SULBACTAM SODIUM 3 G: 2; 1 INJECTION, POWDER, FOR SOLUTION INTRAMUSCULAR; INTRAVENOUS at 09:16

## 2024-05-08 RX ADMIN — DOCUSATE SODIUM 100 MG: 100 CAPSULE, LIQUID FILLED ORAL at 09:16

## 2024-05-08 RX ADMIN — METOPROLOL TARTRATE 25 MG: 25 TABLET, FILM COATED ORAL at 09:16

## 2024-05-08 RX ADMIN — PANTOPRAZOLE SODIUM 40 MG: 40 INJECTION, POWDER, FOR SOLUTION INTRAVENOUS at 06:05

## 2024-05-08 RX ADMIN — HEPARIN SODIUM 5000 UNITS: 5000 INJECTION INTRAVENOUS; SUBCUTANEOUS at 15:25

## 2024-05-08 RX ADMIN — AMLODIPINE BESYLATE 10 MG: 10 TABLET ORAL at 09:16

## 2024-05-08 RX ADMIN — ACETAMINOPHEN 650 MG: 325 TABLET ORAL at 06:09

## 2024-05-08 RX ADMIN — HEPARIN SODIUM 5000 UNITS: 5000 INJECTION INTRAVENOUS; SUBCUTANEOUS at 21:38

## 2024-05-08 ASSESSMENT — COGNITIVE AND FUNCTIONAL STATUS - GENERAL
MOBILITY SCORE: 24
DAILY ACTIVITIY SCORE: 24
MOBILITY SCORE: 24
DAILY ACTIVITIY SCORE: 24

## 2024-05-08 ASSESSMENT — PAIN DESCRIPTION - LOCATION: LOCATION: HEAD

## 2024-05-08 ASSESSMENT — PAIN - FUNCTIONAL ASSESSMENT: PAIN_FUNCTIONAL_ASSESSMENT: 0-10

## 2024-05-08 ASSESSMENT — PAIN SCALES - GENERAL: PAINLEVEL_OUTOF10: 7

## 2024-05-08 NOTE — CONSULTS
"  Wound & Ostomy Care Consult     Visit Date: 5/8/2024      Patient Name: James Ramirez         MRN: [id number]           YOB: 1980     WO nursing visit outcome: patient completed independent pouch change and observed wound care dressing change.       [facility] next scheduled visit/plan: tomorrow for any discharge concerns.  Patient to follow up on Friday in outpatient ostomy clinic.      Stoma Type: Colostomy  Del: No  Diameter: 1 5/8\"   Location: RUQ  Protrusion: Budded  Mucosal Condition and Color: Moist, Red, Edematous  Mucocutaneous Junction: Intact  Peristomal Skin: Clear, intact  Location of Skin Impairment: none  Peristomal Contour: Flat  Supportive Tissue: Firm  Character of Output: Pasty Stool  Emptying Frequency: per nursing- emptied during lesson x1 stool   Removed/Current Pouching System: [place]- 2 piece blue soft convex, (17536) with lock 'n roll pouching (00919), [employer] barrier ring, [place] lubricating deodorizing drops.  Current Wearing Time: 2 Days    Recommendations:   Skin Care: stoma powder as needed for peristomal skin irritation  Pouching System: same as above  Wear Time: 4-5 Days  Other: Patient enrolled in [employer], contact for [employer] provided- patient to call on own.  Discharge supply list provided and 5 pouching systems at bedside with discharge supplies.     Incision: Midline NPWT dressing taken down and replaced with a alternate dressing- se note below. Patient did not qualify for [facility] Vac program.        Supplier:  [email or web address]    Pertinent Labs:   Albumin   Date Value Ref Range Status   04/29/2024 2.3 (L) 3.4 - 5.0 g/dL Final       Wound Assessment:  Wound 04/24/24 Incision Abdomen Lower;Medial (Active)   Wound Image   05/08/24 1115   Site Assessment Other (Comment) 05/07/24 2100   [place]-Wound Assessment Clean 05/07/24 2100   Shape linear 05/05/24 2000   Wound Length (cm) 6 cm 05/08/24 1115   Wound Width (cm) 4 cm 05/08/24 " 1115   Wound Surface Area (cm^2) 24 cm^2 05/08/24 1115   Wound Depth (cm) 1 cm 05/08/24 1115   Wound Volume (cm^3) 24 cm^3 05/08/24 1115   Wound Healing % 62 05/08/24 1115   Closure Open to air 05/07/24 2100   Sutures/Staple Line Approximated 05/07/24 2100   Drainage Description Sanguineous 05/07/24 2100   Drainage Amount Small 05/07/24 2100   Dressing Vacuum dressing 05/07/24 2100   Dressing Status Clean;Dry;Occlusive 05/07/24 2100     Abdominal surgical incision: full thickness  Every other day: Bedside RN/LPN to complete wound care.  Cleanse with Vashe wash and pat dry.  Apply no-sting barrier film to periwound skin.  Hydrafera Blue Ready to the wound base (cut to wound shape, writing faces up)  Cover with Mepilex foam border dressing.     Elenita Subramanian RN, BSN, [facility]  Phone: [phone]  5/8/2024  3:13 PM

## 2024-05-08 NOTE — PROGRESS NOTES
James Ramirez is a 44 y.o. male on day 14 of admission presenting with Bowel perforation (Multi).      Subjective   James Ramirez is a 44-year-old male who presented on 4/24 with abdominal discomfort who was found to have an intra-abdominal abscess with localized and viscus perforation.  Smaller abscesses were noted in the abdomen and pelvis extending into the anterior abdominal wall.  These findings were noted on contrast CT imaging.  He had normal renal function upon presentation.  He went for exploratory laparotomy with drainage of the abdominal wall abscess, partial colon resection with diverting end colostomy on 4/24. By 4/27 we noted worsening renal function. Nephrology was consulted for acute kidney injury.     Seen and examined.   NAHUN. Resting comfortably in bed. Appetite is fair. He does not offer specific complaints.  Chart/labs/meds/notes/imaging/VS reviewed.       Objective          Vitals 24HR  Heart Rate:  [87-95]   Temp:  [36.8 °C (98.2 °F)-37.8 °C (100 °F)]   Resp:  [18]   BP: (128-146)/(81-91)   SpO2:  [98 %-100 %]     Intake/Output last 3 Shifts:    Intake/Output Summary (Last 24 hours) at 5/8/2024 1537  Last data filed at 5/8/2024 0754  Gross per 24 hour   Intake 460 ml   Output --   Net 460 ml       Physical Exam  Constitutional:       Appearance: Normal appearance.  In no acute distress.  Eyes:      Conjunctiva/sclera: Conjunctivae normal.   Cardiovascular:      Rate and Rhythm: Normal rate.  Normal S1 and S2.     Pulses: Normal pulses.   Pulmonary:      Effort: Pulmonary effort is normal.   Abdominal:      Comments: Wound vac and colostomy noted producing brown stool  Genitourinary:     Comments: No Batista  Musculoskeletal:         General: Normal range of motion.  No joint swelling.  Skin:     General: Skin is warm.  No rashes or lesions to exposed skin.  Neurological:      Mental Status: He is oriented to person, place, and time.  Nonfocal.  Psychiatric:         Mood and Affect: Mood normal.          Behavior: Behavior normal.     Scheduled Medications  acetaminophen, 650 mg, nasogastric tube, q6h   Or  acetaminophen, 650 mg, nasogastric tube, q6h   Or  acetaminophen, 650 mg, rectal, q6h  amLODIPine, 10 mg, oral, Daily  ampicillin-sulbactam, 3 g, intravenous, q24h  diatrizoate iva-diatrizoat sod, 90 mL, oral, Once  docusate sodium, 100 mg, oral, BID  heparin (porcine), 5,000 Units, subcutaneous, q8h LILY  metoprolol tartrate, 25 mg, oral, BID  pantoprazole, 40 mg, intravenous, Daily before breakfast  phenylephrine, 1 spray, Each Nostril, Once  polyethylene glycol, 17 g, oral, Daily      Continuous medications  lactated Ringer's, 100 mL/hr, Last Rate: 100 mL/hr (05/08/24 0614)        PRN medications: benzocaine-menthol, [Held by provider] morphine, naloxone, ondansetron **OR** ondansetron     Relevant Results  Results from last 7 days   Lab Units 05/07/24  0611 05/06/24  0624 05/05/24  0459   WBC AUTO x10*3/uL 8.4 8.3 9.6   HEMOGLOBIN g/dL 7.0* 8.2* 7.1*   HEMATOCRIT % 23.1* 26.2* 22.0*   PLATELETS AUTO x10*3/uL 524* 620* 524*     Results from last 7 days   Lab Units 05/07/24  0611 05/06/24  0624 05/05/24  0459   SODIUM mmol/L 140 139 134*   POTASSIUM mmol/L 4.0 4.2 4.8   CHLORIDE mmol/L 107 106 106   CO2 mmol/L 22 21 20*   BUN mg/dL 37* 37* 41*   CREATININE mg/dL 5.28* 5.90* 6.48*   GLUCOSE mg/dL 131* 105* 90   CALCIUM mg/dL 7.3* 7.6* 7.6*       XR chest abdomen for OG NG placement   Final Result   1.  Enteric tube terminates in the left upper quadrant with side hole   below the GE junction. Gaseous distended loops of bowel in the   visualized upper abdomen may relate to ileus. Attention on follow-up   is advised.   2.  No acute cardiopulmonary process.        MACRO:   None        Signed by: Randolph Gtz 5/1/2024 1:26 AM   Dictation workstation:   [id number]      XR abdomen 1 view   Final Result   Positive contrast material in nondilated loops of bowel throughout   the abdomen.        MACRO:   None.         Signed by: Armida Helm 4/30/2024 11:02 AM   Dictation workstation:   [id number]      XR abdomen 1 view   Final Result   Postsurgical changes and support devices as above. Multiple distended   loops of large and small bowel.        MACRO:   None.        Signed by: Armida Helm 4/29/2024 1:24 PM   Dictation workstation:   [id number]      US renal complete   Final Result   1.  No hydronephrosis or hydroureter bilaterally.   2. Nonobstructive calculus in the left kidney.   3.  Increased cortical echogenicity within the kidneys bilaterally,   which can be seen with medical renal disease.        Signed by: Ciro Patel 4/28/2024 5:50 PM   Dictation workstation:   [id number]      XR chest 2 views   Final Result   Left basilar infiltrate or atelectasis. Small effusion.        MACRO:   none        Signed by: Carissa Kim 4/29/2024 7:14 AM   Dictation workstation:   [id number]      XR chest 1 view   Final Result   Hypoventilatory exam.        Mild cardiomegaly.        NG tube in place as described.        MACRO:   None        Signed by: Reji Senior 4/26/2024 1:45 PM   Dictation workstation:   [id number]      XR chest 1 view   Final Result   1. No acute cardiopulmonary process.        MACRO:   None.        Signed by: Joleen Pascual 4/24/2024 11:59 AM   Dictation workstation:   [id number]      CT abdomen pelvis w IV contrast   Final Result   1. Central intraabdominal abscess measuring 9.1 x 7.9 x 3.8 cm..   There are multiple dots of air around this larger abscess consistent   with localized perforation and viscus perforation. This is likely   perforated sigmoid diverticulitis. There are smaller abscesses in the   abdomen and pelvis.        2. This abscess extends into the anterior abdominal wall between the   rectus abdominis muscles.        MACRO:   Julianne Meneses discussed the significance and urgency of this   critical finding by secure chat with Dionisio Salinas and AILYN PANG   on 4/24/2024 at 11:40 am.   (**-RCF-**) Findings:  See findings.        Signed by: Julianne Meneses 4/24/2024 11:41 AM   Dictation workstation:   [id number]               Assessment/Plan      James Ramirez is a 44-year-old male who presented on 4/24 with abdominal discomfort who was found to have an intra-abdominal abscess with localized and viscus perforation.  Smaller abscesses were noted in the abdomen and pelvis extending into the anterior abdominal wall.  These findings were noted on contrast CT imaging.  He had normal renal function upon presentation.  He went for exploratory laparotomy with drainage of the abdominal wall abscess, partial colon resection with diverting end colostomy on 4/24.  By 4/27 we noted worsening renal function.  Nephrology was consulted for acute kidney injury.    Mr. Ramirez creatinine peaked at 7.5 mg a deciliter by 5/25.  Fortunately he did not require renal replacement therapy.  His creatinine is slowly improving.  Acute kidney injury is due to acute tubular necrosis.  He was noted to have an elevated vancomycin levels.  He did receive iodinated contrast with his creatinine rise potentially around the 48-hour rg and may be a potential culprit.  He had nearly a 4 g drop in his hemoglobin in the postoperative period with likely a hemodynamic component albeit no true hypotension documented.  He is no longer on antimicrobial coverage.  Blood pressures are holding steady.  He is voiding on his own and is nonoliguric.  There has been no indication for RRT. I have stopped IV fluids as this will not improve his renal function. Of note, TP:Cr ratio with proteinuria is in the setting of ATN. This can be repeated as his renal function improves. He understands that he must avoid anti-inflammatories upon discharge.  He will need very close renal follow-up with an RFP within 48 hours of DC as well as a weekly RFP until we see significant renal function improvement. I sent iron stores and ferritin in anticipation for iron/EPO.  He received a transfusion yesterday. I do not have a CBC or RFP back from today. I have placed orders for labs. We are avoiding nephrotoxins. Will follow. We will schedule him outpatient to be seen in the office - [phone].    Principal Problem:    Bowel perforation (Multi)  Active Problems:    Intra-abdominal abscess (Multi)    Diverticulitis    Acute kidney injury (CMS-HCC)    Primary hypertension         I spent 40 minutes in the professional and overall care of this patient.      Eulogio Gandhi, DO

## 2024-05-08 NOTE — CARE PLAN
The patient's goals for the shift include      The clinical goals for the shift include Patient will remain safe and HD stable      Problem: Nutrition  Goal: Less than 5 days NPO/clear liquids  5/8/2024 0804 by Reji Strickland RN  Outcome: Progressing  5/8/2024 0103 by Reji Strickland RN  Outcome: Progressing  Goal: Oral intake greater than 50%  5/8/2024 0804 by Reji Strickland RN  Outcome: Progressing  5/8/2024 0103 by Reji Strickland RN  Outcome: Progressing  Goal: Oral intake greater 75%  5/8/2024 0804 by Reji Strickland RN  Outcome: Progressing  5/8/2024 0103 by Reji Strickland RN  Outcome: Progressing  Goal: Consume prescribed supplement  5/8/2024 0804 by Reji Strickland RN  Outcome: Progressing  5/8/2024 0103 by Reji Strickland RN  Outcome: Progressing  Goal: Adequate PO fluid intake  5/8/2024 0804 by Reji Strickland RN  Outcome: Progressing  5/8/2024 0103 by Reji Strickland RN  Outcome: Progressing  Goal: Nutrition support goals are met within 48 hrs  5/8/2024 0804 by Reji Strickland RN  Outcome: Progressing  5/8/2024 0103 by Reji Strickland RN  Outcome: Progressing  Goal: Nutrition support is meeting 75% of nutrient needs  5/8/2024 0804 by Reji Strickland RN  Outcome: Progressing  5/8/2024 0103 by Reji Strickland RN  Outcome: Progressing  Goal: Tube feed tolerance  5/8/2024 0804 by Reji Strickland RN  Outcome: Progressing  5/8/2024 0103 by Reji Strickland RN  Outcome: Progressing  Goal: BG  mg/dL  5/8/2024 0804 by Reji Strickland RN  Outcome: Progressing  5/8/2024 0103 by Reji Strickland RN  Outcome: Progressing  Goal: Lab values WNL  5/8/2024 0804 by Reji Strickland RN  Outcome: Progressing  5/8/2024 0103 by Reji Kabongo, RN  Outcome: Progressing  Goal: Electrolytes WNL  5/8/2024 0804 by Reji Strickland RN  Outcome: Progressing  5/8/2024 0103 by Reji Strickland RN  Outcome: Progressing  Goal: Promote healing  5/8/2024 0804 by Reji Strickland RN  Outcome: Progressing  5/8/2024 0103 by Reji Strickland RN  Outcome:  Progressing  Goal: Maintain stable weight  5/8/2024 0804 by Reji Strickland RN  Outcome: Progressing  5/8/2024 0103 by Reji Strickland RN  Outcome: Progressing  Goal: Reduce weight from edema/fluid  5/8/2024 0804 by Reji Strickland RN  Outcome: Progressing  5/8/2024 0103 by Reji Strickland RN  Outcome: Progressing  Goal: Gradual weight gain  5/8/2024 0804 by Reji Strickland RN  Outcome: Progressing  5/8/2024 0103 by Reji Strickland RN  Outcome: Progressing  Goal: Improve ostomy output  5/8/2024 0804 by Reji Strickland RN  Outcome: Progressing  5/8/2024 0103 by Reji Strickland RN  Outcome: Progressing     Problem: Pain - Adult  Goal: Verbalizes/displays adequate comfort level or baseline comfort l  Problem: Pain  Goal: Takes deep breaths with improved pain control throughout the shift  5/8/2024 0804 by Reji Strickland RN  Outcome: Progressing  5/8/2024 0103 by Reji Strickland RN  Outcome: Progressing  Goal: Turns in bed with improved pain control throughout the shift  5/8/2024 0804 by Reji Strickland RN  Outcome: Progressing  5/8/2024 0103 by Reji Strickland RN  Outcome: Progressing  Goal: Walks with improved pain control throughout the shift  5/8/2024 0804 by Reij Strickland RN  Outcome: Progressing  5/8/2024 0103 by Reji Strickland RN  Outcome: Progressing  Goal: Performs ADL's with improved pain control throughout shift  5/8/2024 0804 by Reji Strickland RN  Outcome: Progressing  5/8/2024 0103 by Reji Strickland RN  Outcome: Progressing  Goal: Participates in PT with improved pain control throughout the shift  5/8/2024 0804 by Reji Strickland RN  Outcome: Progressing  5/8/2024 0103 by Reji Strickland RN  Outcome: Progressing  Goal: Free from opioid side effects throughout the shift  5/8/2024 0804 by Reji Strickland RN  Outcome: Progressing  5/8/2024 0103 by Reji Strickland, RN  Outcome: Progressing  Goal: Free from acute confusion related to pain meds throughout the shift  5/8/2024 0804 by Reji Strickland RN  Outcome:  Progressing  5/8/2024 0103 by Reji Strickland RN  Outcome: Progressing   evel  5/8/2024 0804 by Reji Strickland RN  Outcome: Progressing  5/8/2024 0103 by Reji Strickland RN  Outcome: Progressing     Problem: Safety - Adult  Goal: Free from fall injury  5/8/2024 0804 by Reji Strickland RN  Outcome: Progressing  5/8/2024 0103 by Reji Strickland RN  Outcome: Progressing

## 2024-05-08 NOTE — PROGRESS NOTES
05/08/24 0705   Discharge Planning   Patient expects to be discharged to: Home with outpt wound and ostomy appts     Patient does not have HHC benefits or DME benefits, wound care team working on getting a kassie vac from [facility], will be managed by [place] [facility] (patient preferred location) appt was made for 5/9/24, will be seen in the ostomy clinic here at [facility] post dc.   Renal and gen surg have both cleared for dc.    ADOD today/tomorrow  BARRIERS wound vac approval  DISPO home with outpt appts    Updates from wound team, patient not approved for kassie wound vac, plan for dc home with out patient follow ups for wound care and ostomy care.

## 2024-05-08 NOTE — PROGRESS NOTES
"James Ramirez is a 44 y.o. male on day 14 of admission presenting with Bowel perforation (Multi).    Subjective   Patient tolerating p.o. diet.  Walking.  Feeling better       Objective     Physical Exam  Vitals reviewed.   Constitutional:       Appearance: Normal appearance.   HENT:      Head: Normocephalic and atraumatic.      Right Ear: Tympanic membrane, ear canal and external ear normal.      Left Ear: Tympanic membrane, ear canal and external ear normal.      Nose: Nose normal.      Mouth/Throat:      Pharynx: Oropharynx is clear.   Eyes:      Extraocular Movements: Extraocular movements intact.      Conjunctiva/sclera: Conjunctivae normal.      Pupils: Pupils are equal, round, and reactive to light.   Cardiovascular:      Rate and Rhythm: Normal rate and regular rhythm.      Pulses: Normal pulses.      Heart sounds: Normal heart sounds.   Pulmonary:      Effort: Pulmonary effort is normal.      Breath sounds: Normal breath sounds.   Abdominal:      General: Abdomen is flat. Bowel sounds are normal.      Palpations: Abdomen is soft.      Comments: Ileostomy pouch   Musculoskeletal:      Cervical back: Normal range of motion and neck supple.   Skin:     General: Skin is warm and dry.   Neurological:      General: No focal deficit present.      Mental Status: He is alert and oriented to person, place, and time.   Psychiatric:         Mood and Affect: Mood normal.     Pt has ileostomy and also wound vac in place    Last Recorded Vitals  Blood pressure 136/88, pulse 89, temperature 36.8 °C (98.3 °F), temperature source Oral, resp. rate 18, height 1.83 m (6' 0.05\"), weight 74.3 kg (163 lb 14.4 oz), SpO2 98%.  Intake/Output last 3 Shifts:  I/O last 3 completed shifts:  In: 3251.7 (43.7 mL/kg) [P.O.:480; I.V.:2371.7 (31.9 mL/kg); IV Piggyback:400]  Out: 1100 (14.8 mL/kg) [Urine:1100 (0.4 mL/kg/hr)]  Weight: 74.3 kg     Relevant Results  Scheduled medications  acetaminophen, 650 mg, nasogastric tube, q6h   " Or  acetaminophen, 650 mg, nasogastric tube, q6h   Or  acetaminophen, 650 mg, rectal, q6h  amLODIPine, 10 mg, oral, Daily  ampicillin-sulbactam, 3 g, intravenous, q24h  diatrizoate iva-diatrizoat sod, 90 mL, oral, Once  docusate sodium, 100 mg, oral, BID  heparin (porcine), 5,000 Units, subcutaneous, q8h LILY  metoprolol tartrate, 25 mg, oral, BID  pantoprazole, 40 mg, intravenous, Daily before breakfast  phenylephrine, 1 spray, Each Nostril, Once  polyethylene glycol, 17 g, oral, Daily      Continuous medications  [Held by provider] lactated Ringer's, 100 mL/hr, Last Rate: Stopped (05/08/24 1538)      PRN medications  PRN medications: benzocaine-menthol, [Held by provider] morphine, naloxone, ondansetron **OR** ondansetron  Results for orders placed or performed during the hospital encounter of 04/24/24 (from the past 96 hour(s))   CBC   Result Value Ref Range    WBC 9.6 4.4 - 11.3 x10*3/uL    nRBC 0.0 0.0 - 0.0 /100 WBCs    RBC 2.80 (L) 4.50 - 5.90 x10*6/uL    Hemoglobin 7.1 (L) 13.5 - 17.5 g/dL    Hematocrit 22.0 (L) 41.0 - 52.0 %    MCV 79 (L) 80 - 100 fL    MCH 25.4 (L) 26.0 - 34.0 pg    MCHC 32.3 32.0 - 36.0 g/dL    RDW 14.9 (H) 11.5 - 14.5 %    Platelets 524 (H) 150 - 450 x10*3/uL   Basic Metabolic Panel   Result Value Ref Range    Glucose 90 74 - 99 mg/dL    Sodium 134 (L) 136 - 145 mmol/L    Potassium 4.8 3.5 - 5.3 mmol/L    Chloride 106 98 - 107 mmol/L    Bicarbonate 20 (L) 21 - 32 mmol/L    Anion Gap 13 10 - 20 mmol/L    Urea Nitrogen 41 (H) 6 - 23 mg/dL    Creatinine 6.48 (H) 0.50 - 1.30 mg/dL    eGFR 10 (L) >60 mL/min/1.73m*2    Calcium 7.6 (L) 8.6 - 10.3 mg/dL   CBC   Result Value Ref Range    WBC 8.3 4.4 - 11.3 x10*3/uL    nRBC 0.0 0.0 - 0.0 /100 WBCs    RBC 3.25 (L) 4.50 - 5.90 x10*6/uL    Hemoglobin 8.2 (L) 13.5 - 17.5 g/dL    Hematocrit 26.2 (L) 41.0 - 52.0 %    MCV 81 80 - 100 fL    MCH 25.2 (L) 26.0 - 34.0 pg    MCHC 31.3 (L) 32.0 - 36.0 g/dL    RDW 15.4 (H) 11.5 - 14.5 %    Platelets 620 (H) 150  - 450 x10*3/uL   Basic Metabolic Panel   Result Value Ref Range    Glucose 105 (H) 74 - 99 mg/dL    Sodium 139 136 - 145 mmol/L    Potassium 4.2 3.5 - 5.3 mmol/L    Chloride 106 98 - 107 mmol/L    Bicarbonate 21 21 - 32 mmol/L    Anion Gap 16 10 - 20 mmol/L    Urea Nitrogen 37 (H) 6 - 23 mg/dL    Creatinine 5.90 (H) 0.50 - 1.30 mg/dL    eGFR 11 (L) >60 mL/min/1.73m*2    Calcium 7.6 (L) 8.6 - 10.3 mg/dL   CBC   Result Value Ref Range    WBC 8.4 4.4 - 11.3 x10*3/uL    nRBC 0.0 0.0 - 0.0 /100 WBCs    RBC 2.82 (L) 4.50 - 5.90 x10*6/uL    Hemoglobin 7.0 (L) 13.5 - 17.5 g/dL    Hematocrit 23.1 (L) 41.0 - 52.0 %    MCV 82 80 - 100 fL    MCH 24.8 (L) 26.0 - 34.0 pg    MCHC 30.3 (L) 32.0 - 36.0 g/dL    RDW 15.6 (H) 11.5 - 14.5 %    Platelets 524 (H) 150 - 450 x10*3/uL   Basic Metabolic Panel   Result Value Ref Range    Glucose 131 (H) 74 - 99 mg/dL    Sodium 140 136 - 145 mmol/L    Potassium 4.0 3.5 - 5.3 mmol/L    Chloride 107 98 - 107 mmol/L    Bicarbonate 22 21 - 32 mmol/L    Anion Gap 15 10 - 20 mmol/L    Urea Nitrogen 37 (H) 6 - 23 mg/dL    Creatinine 5.28 (H) 0.50 - 1.30 mg/dL    eGFR 13 (L) >60 mL/min/1.73m*2    Calcium 7.3 (L) 8.6 - 10.3 mg/dL   Prepare RBC: 1 Units   Result Value Ref Range    PRODUCT CODE T7995P56     Unit Number [id number]     Unit ABO O     Unit RH POS     XM INTEP COMP     Dispense Status XM     Blood Expiration Date May 28, 2024 23:59 EDT     PRODUCT BLOOD TYPE 5100     UNIT VOLUME 287    Type and screen   Result Value Ref Range    ABO TYPE O     Rh TYPE POS     ANTIBODY SCREEN NEG    Iron and TIBC   Result Value Ref Range    Iron 12 (L) 35 - 150 ug/dL    UIBC 184 110 - 370 ug/dL    TIBC 196 (L) 240 - 445 ug/dL    % Saturation 6 (L) 25 - 45 %   Ferritin   Result Value Ref Range    Ferritin 268 20 - 300 ng/mL   Lavender Top   Result Value Ref Range    Extra Tube Hold for add-ons.                Scheduled medications  acetaminophen, 650 mg, nasogastric tube, q6h   Or  acetaminophen, 650 mg,  nasogastric tube, q6h   Or  acetaminophen, 650 mg, rectal, q6h  amLODIPine, 10 mg, oral, Daily  ampicillin-sulbactam, 3 g, intravenous, q24h  diatrizoate iva-diatrizoat sod, 90 mL, oral, Once  docusate sodium, 100 mg, oral, BID  heparin (porcine), 5,000 Units, subcutaneous, q8h LILY  metoprolol tartrate, 25 mg, oral, BID  pantoprazole, 40 mg, intravenous, Daily before breakfast  phenylephrine, 1 spray, Each Nostril, Once  polyethylene glycol, 17 g, oral, Daily      Continuous medications  [Held by provider] lactated Ringer's, 100 mL/hr, Last Rate: Stopped (05/08/24 1538)      PRN medications  PRN medications: benzocaine-menthol, [Held by provider] morphine, naloxone, ondansetron **OR** ondansetron  Results for orders placed or performed during the hospital encounter of 04/24/24 (from the past 24 hour(s))   Type and screen   Result Value Ref Range    ABO TYPE O     Rh TYPE POS     ANTIBODY SCREEN NEG    Iron and TIBC   Result Value Ref Range    Iron 12 (L) 35 - 150 ug/dL    UIBC 184 110 - 370 ug/dL    TIBC 196 (L) 240 - 445 ug/dL    % Saturation 6 (L) 25 - 45 %   Ferritin   Result Value Ref Range    Ferritin 268 20 - 300 ng/mL   Lavender Top   Result Value Ref Range    Extra Tube Hold for add-ons.      No results found.                Assessment/Plan   Principal Problem:    Bowel perforation (Multi)  Active Problems:    Intra-abdominal abscess (Multi)    Diverticulitis    Acute kidney injury (CMS-HCC)    Primary hypertension    Kidney function starting to improve  Fluids per renal   Having bowel movements  Tolerating oral intake   IV Unasyn plan to switch to p.o. Augmentin per ID at discharge  Continue wound care  Pt could not be transfused yesterday as he was reluctant initially and then had a fever  Repeat labs and will transfuse based on labs    Discharge pending kidney function improvement        I spent  31 minutes in the professional and overall care of this patient.      Mook Clinton MD

## 2024-05-09 ENCOUNTER — APPOINTMENT (OUTPATIENT)
Dept: WOUND CARE | Facility: CLINIC | Age: 44
End: 2024-05-09
Payer: COMMERCIAL

## 2024-05-09 VITALS
WEIGHT: 163.8 LBS | TEMPERATURE: 98.4 F | SYSTOLIC BLOOD PRESSURE: 142 MMHG | BODY MASS INDEX: 22.19 KG/M2 | DIASTOLIC BLOOD PRESSURE: 95 MMHG | HEART RATE: 84 BPM | OXYGEN SATURATION: 97 % | RESPIRATION RATE: 18 BRPM | HEIGHT: 72 IN

## 2024-05-09 LAB
ALBUMIN SERPL BCP-MCNC: 2.6 G/DL (ref 3.4–5)
ANION GAP SERPL CALC-SCNC: 14 MMOL/L (ref 10–20)
BLOOD EXPIRATION DATE: NORMAL
BUN SERPL-MCNC: 34 MG/DL (ref 6–23)
CALCIUM SERPL-MCNC: 7.2 MG/DL (ref 8.6–10.3)
CHLORIDE SERPL-SCNC: 107 MMOL/L (ref 98–107)
CO2 SERPL-SCNC: 23 MMOL/L (ref 21–32)
CREAT SERPL-MCNC: 3.96 MG/DL (ref 0.5–1.3)
DISPENSE STATUS: NORMAL
EGFRCR SERPLBLD CKD-EPI 2021: 18 ML/MIN/1.73M*2
ERYTHROCYTE [DISTWIDTH] IN BLOOD BY AUTOMATED COUNT: 15.9 % (ref 11.5–14.5)
GLUCOSE SERPL-MCNC: 97 MG/DL (ref 74–99)
HCT VFR BLD AUTO: 21.2 % (ref 41–52)
HCT VFR BLD AUTO: 26.5 % (ref 41–52)
HGB BLD-MCNC: 6.6 G/DL (ref 13.5–17.5)
HGB BLD-MCNC: 8.4 G/DL (ref 13.5–17.5)
MCH RBC QN AUTO: 25.3 PG (ref 26–34)
MCHC RBC AUTO-ENTMCNC: 31.1 G/DL (ref 32–36)
MCV RBC AUTO: 81 FL (ref 80–100)
NRBC BLD-RTO: 0 /100 WBCS (ref 0–0)
PHOSPHATE SERPL-MCNC: 5.1 MG/DL (ref 2.5–4.9)
PLATELET # BLD AUTO: 430 X10*3/UL (ref 150–450)
POTASSIUM SERPL-SCNC: 3.6 MMOL/L (ref 3.5–5.3)
PRODUCT BLOOD TYPE: 5100
PRODUCT CODE: NORMAL
RBC # BLD AUTO: 2.61 X10*6/UL (ref 4.5–5.9)
SODIUM SERPL-SCNC: 140 MMOL/L (ref 136–145)
UNIT ABO: NORMAL
UNIT NUMBER: NORMAL
UNIT RH: NORMAL
UNIT VOLUME: 280
WBC # BLD AUTO: 7.5 X10*3/UL (ref 4.4–11.3)
XM INTEP: NORMAL

## 2024-05-09 PROCEDURE — 85027 COMPLETE CBC AUTOMATED: CPT | Performed by: INTERNAL MEDICINE

## 2024-05-09 PROCEDURE — 85018 HEMOGLOBIN: CPT | Performed by: FAMILY MEDICINE

## 2024-05-09 PROCEDURE — 80069 RENAL FUNCTION PANEL: CPT | Performed by: INTERNAL MEDICINE

## 2024-05-09 PROCEDURE — 2500000004 HC RX 250 GENERAL PHARMACY W/ HCPCS (ALT 636 FOR OP/ED): Performed by: INTERNAL MEDICINE

## 2024-05-09 PROCEDURE — 36430 TRANSFUSION BLD/BLD COMPNT: CPT

## 2024-05-09 PROCEDURE — 85014 HEMATOCRIT: CPT | Performed by: FAMILY MEDICINE

## 2024-05-09 PROCEDURE — 2500000001 HC RX 250 WO HCPCS SELF ADMINISTERED DRUGS (ALT 637 FOR MEDICARE OP): Performed by: INTERNAL MEDICINE

## 2024-05-09 PROCEDURE — P9016 RBC LEUKOCYTES REDUCED: HCPCS

## 2024-05-09 PROCEDURE — 36415 COLL VENOUS BLD VENIPUNCTURE: CPT | Performed by: FAMILY MEDICINE

## 2024-05-09 PROCEDURE — 36415 COLL VENOUS BLD VENIPUNCTURE: CPT | Performed by: INTERNAL MEDICINE

## 2024-05-09 RX ORDER — POLYETHYLENE GLYCOL 3350 17 G/17G
17 POWDER, FOR SOLUTION ORAL DAILY
Qty: 30 PACKET | Refills: 0 | Status: SHIPPED | OUTPATIENT
Start: 2024-05-10 | End: 2024-06-09

## 2024-05-09 RX ORDER — AMLODIPINE BESYLATE 10 MG/1
10 TABLET ORAL DAILY
Qty: 30 TABLET | Refills: 0 | Status: SHIPPED | OUTPATIENT
Start: 2024-05-10 | End: 2024-06-09

## 2024-05-09 RX ORDER — PANTOPRAZOLE SODIUM 40 MG/1
40 TABLET, DELAYED RELEASE ORAL DAILY
Qty: 30 TABLET | Refills: 0 | Status: SHIPPED | OUTPATIENT
Start: 2024-05-09 | End: 2024-06-08

## 2024-05-09 RX ORDER — METOPROLOL TARTRATE 25 MG/1
25 TABLET, FILM COATED ORAL 2 TIMES DAILY
Qty: 60 TABLET | Refills: 0 | Status: SHIPPED | OUTPATIENT
Start: 2024-05-09 | End: 2024-06-08

## 2024-05-09 RX ORDER — DOCUSATE SODIUM 100 MG/1
100 CAPSULE, LIQUID FILLED ORAL 2 TIMES DAILY
Start: 2024-05-09

## 2024-05-09 RX ORDER — ONDANSETRON 4 MG/1
4 TABLET, FILM COATED ORAL EVERY 6 HOURS PRN
Qty: 20 TABLET | Refills: 0 | Status: SHIPPED | OUTPATIENT
Start: 2024-05-09

## 2024-05-09 RX ORDER — ACETAMINOPHEN 325 MG/1
650 TABLET ORAL EVERY 6 HOURS
Start: 2024-05-09

## 2024-05-09 RX ADMIN — ACETAMINOPHEN 650 MG: 325 TABLET ORAL at 10:20

## 2024-05-09 RX ADMIN — AMLODIPINE BESYLATE 10 MG: 10 TABLET ORAL at 10:20

## 2024-05-09 RX ADMIN — METOPROLOL TARTRATE 25 MG: 25 TABLET, FILM COATED ORAL at 10:20

## 2024-05-09 RX ADMIN — HEPARIN SODIUM 5000 UNITS: 5000 INJECTION INTRAVENOUS; SUBCUTANEOUS at 14:00

## 2024-05-09 RX ADMIN — HEPARIN SODIUM 5000 UNITS: 5000 INJECTION INTRAVENOUS; SUBCUTANEOUS at 06:21

## 2024-05-09 ASSESSMENT — COGNITIVE AND FUNCTIONAL STATUS - GENERAL
DAILY ACTIVITIY SCORE: 24
MOBILITY SCORE: 24

## 2024-05-09 ASSESSMENT — PAIN SCALES - GENERAL: PAINLEVEL_OUTOF10: 0 - NO PAIN

## 2024-05-09 ASSESSMENT — PAIN - FUNCTIONAL ASSESSMENT: PAIN_FUNCTIONAL_ASSESSMENT: 0-10

## 2024-05-09 NOTE — NURSING NOTE
Throughout shift, hourly rounding done per protocol. Patient resting in bed with call light in reach.    Patient's hemoglobin is 6.6. Dr. Clinton notified via secure chat. New orders pending. Patient very disappointed with results. Patient asked if he could sign out AMA. After discussing risks and benefit, patient agreed to staying for possible blood transfusion. Oncoming nurse, SEEMA Jefferson also aware.

## 2024-05-09 NOTE — NURSING NOTE

## 2024-05-09 NOTE — PROGRESS NOTES
"James Ramirez is a 44 y.o. male on day 15 of admission presenting with Bowel perforation (Multi).    Subjective   Patient tolerating p.o. diet.  Walking.  Feeling better     Does have colostomy   No pain   Tolerating diet   No heart burns  Objective     Physical Exam  Vitals reviewed.   Constitutional:       Appearance: Normal appearance.   HENT:      Head: Normocephalic and atraumatic.      Right Ear: Tympanic membrane, ear canal and external ear normal.      Left Ear: Tympanic membrane, ear canal and external ear normal.      Nose: Nose normal.      Mouth/Throat:      Pharynx: Oropharynx is clear.   Eyes:      Extraocular Movements: Extraocular movements intact.      Conjunctiva/sclera: Conjunctivae normal.      Pupils: Pupils are equal, round, and reactive to light.   Cardiovascular:      Rate and Rhythm: Normal rate and regular rhythm.      Pulses: Normal pulses.      Heart sounds: Normal heart sounds.   Pulmonary:      Effort: Pulmonary effort is normal.      Breath sounds: Normal breath sounds.   Abdominal:      General: Abdomen is flat. Bowel sounds are normal.      Palpations: Abdomen is soft.      Comments: Ileostomy pouch   Musculoskeletal:      Cervical back: Normal range of motion and neck supple.   Skin:     General: Skin is warm and dry.   Neurological:      General: No focal deficit present.      Mental Status: He is alert and oriented to person, place, and time.   Psychiatric:         Mood and Affect: Mood normal.     Pt has ileostomy and also wound vac in place    Last Recorded Vitals  Blood pressure 123/63, pulse 88, temperature 36.3 °C (97.3 °F), temperature source Temporal, resp. rate 16, height 1.83 m (6' 0.05\"), weight 74.3 kg (163 lb 12.8 oz), SpO2 99%.  Intake/Output last 3 Shifts:  I/O last 3 completed shifts:  In: 480 (6.5 mL/kg) [P.O.:480]  Out: - (0 mL/kg)   Weight: 74.3 kg     Relevant Results  Scheduled medications  acetaminophen, 650 mg, nasogastric tube, q6h   Or  acetaminophen, 650 mg, " nasogastric tube, q6h   Or  acetaminophen, 650 mg, rectal, q6h  amLODIPine, 10 mg, oral, Daily  diatrizoate iva-diatrizoat sod, 90 mL, oral, Once  docusate sodium, 100 mg, oral, BID  heparin (porcine), 5,000 Units, subcutaneous, q8h LILY  iron sucrose, 200 mg, intravenous, Once  metoprolol tartrate, 25 mg, oral, BID  pantoprazole, 40 mg, intravenous, Daily before breakfast  phenylephrine, 1 spray, Each Nostril, Once  polyethylene glycol, 17 g, oral, Daily      Continuous medications  [Held by provider] lactated Ringer's, 100 mL/hr, Last Rate: Stopped (05/08/24 1538)      PRN medications  PRN medications: benzocaine-menthol, [Held by provider] morphine, naloxone, ondansetron **OR** ondansetron  Results for orders placed or performed during the hospital encounter of 04/24/24 (from the past 96 hour(s))   CBC   Result Value Ref Range    WBC 8.3 4.4 - 11.3 x10*3/uL    nRBC 0.0 0.0 - 0.0 /100 WBCs    RBC 3.25 (L) 4.50 - 5.90 x10*6/uL    Hemoglobin 8.2 (L) 13.5 - 17.5 g/dL    Hematocrit 26.2 (L) 41.0 - 52.0 %    MCV 81 80 - 100 fL    MCH 25.2 (L) 26.0 - 34.0 pg    MCHC 31.3 (L) 32.0 - 36.0 g/dL    RDW 15.4 (H) 11.5 - 14.5 %    Platelets 620 (H) 150 - 450 x10*3/uL   Basic Metabolic Panel   Result Value Ref Range    Glucose 105 (H) 74 - 99 mg/dL    Sodium 139 136 - 145 mmol/L    Potassium 4.2 3.5 - 5.3 mmol/L    Chloride 106 98 - 107 mmol/L    Bicarbonate 21 21 - 32 mmol/L    Anion Gap 16 10 - 20 mmol/L    Urea Nitrogen 37 (H) 6 - 23 mg/dL    Creatinine 5.90 (H) 0.50 - 1.30 mg/dL    eGFR 11 (L) >60 mL/min/1.73m*2    Calcium 7.6 (L) 8.6 - 10.3 mg/dL   CBC   Result Value Ref Range    WBC 8.4 4.4 - 11.3 x10*3/uL    nRBC 0.0 0.0 - 0.0 /100 WBCs    RBC 2.82 (L) 4.50 - 5.90 x10*6/uL    Hemoglobin 7.0 (L) 13.5 - 17.5 g/dL    Hematocrit 23.1 (L) 41.0 - 52.0 %    MCV 82 80 - 100 fL    MCH 24.8 (L) 26.0 - 34.0 pg    MCHC 30.3 (L) 32.0 - 36.0 g/dL    RDW 15.6 (H) 11.5 - 14.5 %    Platelets 524 (H) 150 - 450 x10*3/uL   Basic Metabolic  Panel   Result Value Ref Range    Glucose 131 (H) 74 - 99 mg/dL    Sodium 140 136 - 145 mmol/L    Potassium 4.0 3.5 - 5.3 mmol/L    Chloride 107 98 - 107 mmol/L    Bicarbonate 22 21 - 32 mmol/L    Anion Gap 15 10 - 20 mmol/L    Urea Nitrogen 37 (H) 6 - 23 mg/dL    Creatinine 5.28 (H) 0.50 - 1.30 mg/dL    eGFR 13 (L) >60 mL/min/1.73m*2    Calcium 7.3 (L) 8.6 - 10.3 mg/dL   Prepare RBC: 1 Units   Result Value Ref Range    PRODUCT CODE R8191R86     Unit Number [id number]     Unit ABO O     Unit RH POS     XM INTEP COMP     Dispense Status IS     Blood Expiration Date May 28, 2024 23:59 EDT     PRODUCT BLOOD TYPE 5100     UNIT VOLUME 287    Type and screen   Result Value Ref Range    ABO TYPE O     Rh TYPE POS     ANTIBODY SCREEN NEG    Iron and TIBC   Result Value Ref Range    Iron 12 (L) 35 - 150 ug/dL    UIBC 184 110 - 370 ug/dL    TIBC 196 (L) 240 - 445 ug/dL    % Saturation 6 (L) 25 - 45 %   Ferritin   Result Value Ref Range    Ferritin 268 20 - 300 ng/mL   Lavender Top   Result Value Ref Range    Extra Tube Hold for add-ons.    CBC   Result Value Ref Range    WBC 6.6 4.4 - 11.3 x10*3/uL    nRBC 0.0 0.0 - 0.0 /100 WBCs    RBC 2.93 (L) 4.50 - 5.90 x10*6/uL    Hemoglobin 7.4 (L) 13.5 - 17.5 g/dL    Hematocrit 24.0 (L) 41.0 - 52.0 %    MCV 82 80 - 100 fL    MCH 25.3 (L) 26.0 - 34.0 pg    MCHC 30.8 (L) 32.0 - 36.0 g/dL    RDW 15.9 (H) 11.5 - 14.5 %    Platelets 495 (H) 150 - 450 x10*3/uL   Basic metabolic panel   Result Value Ref Range    Glucose 119 (H) 74 - 99 mg/dL    Sodium 139 136 - 145 mmol/L    Potassium 3.6 3.5 - 5.3 mmol/L    Chloride 105 98 - 107 mmol/L    Bicarbonate 21 21 - 32 mmol/L    Anion Gap 17 10 - 20 mmol/L    Urea Nitrogen 32 (H) 6 - 23 mg/dL    Creatinine 4.77 (H) 0.50 - 1.30 mg/dL    eGFR 15 (L) >60 mL/min/1.73m*2    Calcium 7.5 (L) 8.6 - 10.3 mg/dL   Renal function panel   Result Value Ref Range    Glucose 97 74 - 99 mg/dL    Sodium 140 136 - 145 mmol/L    Potassium 3.6 3.5 - 5.3 mmol/L     Chloride 107 98 - 107 mmol/L    Bicarbonate 23 21 - 32 mmol/L    Anion Gap 14 10 - 20 mmol/L    Urea Nitrogen 34 (H) 6 - 23 mg/dL    Creatinine 3.96 (H) 0.50 - 1.30 mg/dL    eGFR 18 (L) >60 mL/min/1.73m*2    Calcium 7.2 (L) 8.6 - 10.3 mg/dL    Phosphorus 5.1 (H) 2.5 - 4.9 mg/dL    Albumin 2.6 (L) 3.4 - 5.0 g/dL   CBC   Result Value Ref Range    WBC 7.5 4.4 - 11.3 x10*3/uL    nRBC 0.0 0.0 - 0.0 /100 WBCs    RBC 2.61 (L) 4.50 - 5.90 x10*6/uL    Hemoglobin 6.6 (L) 13.5 - 17.5 g/dL    Hematocrit 21.2 (L) 41.0 - 52.0 %    MCV 81 80 - 100 fL    MCH 25.3 (L) 26.0 - 34.0 pg    MCHC 31.1 (L) 32.0 - 36.0 g/dL    RDW 15.9 (H) 11.5 - 14.5 %    Platelets 430 150 - 450 x10*3/uL   Prepare RBC: 1 Units   Result Value Ref Range    PRODUCT CODE K7651P28     Unit Number [id number]     Unit ABO O     Unit RH POS     XM INTEP COMP     Dispense Status XM     Blood Expiration Date June 05, 2024 23:59 EDT     PRODUCT BLOOD TYPE 5100     UNIT VOLUME 280                Scheduled medications  acetaminophen, 650 mg, nasogastric tube, q6h   Or  acetaminophen, 650 mg, nasogastric tube, q6h   Or  acetaminophen, 650 mg, rectal, q6h  amLODIPine, 10 mg, oral, Daily  diatrizoate iva-diatrizoat sod, 90 mL, oral, Once  docusate sodium, 100 mg, oral, BID  heparin (porcine), 5,000 Units, subcutaneous, q8h LILY  iron sucrose, 200 mg, intravenous, Once  metoprolol tartrate, 25 mg, oral, BID  pantoprazole, 40 mg, intravenous, Daily before breakfast  phenylephrine, 1 spray, Each Nostril, Once  polyethylene glycol, 17 g, oral, Daily      Continuous medications  [Held by provider] lactated Ringer's, 100 mL/hr, Last Rate: Stopped (05/08/24 1538)      PRN medications  PRN medications: benzocaine-menthol, [Held by provider] morphine, naloxone, ondansetron **OR** ondansetron  Results for orders placed or performed during the hospital encounter of 04/24/24 (from the past 24 hour(s))   CBC   Result Value Ref Range    WBC 6.6 4.4 - 11.3 x10*3/uL    nRBC 0.0 0.0 -  0.0 /100 WBCs    RBC 2.93 (L) 4.50 - 5.90 x10*6/uL    Hemoglobin 7.4 (L) 13.5 - 17.5 g/dL    Hematocrit 24.0 (L) 41.0 - 52.0 %    MCV 82 80 - 100 fL    MCH 25.3 (L) 26.0 - 34.0 pg    MCHC 30.8 (L) 32.0 - 36.0 g/dL    RDW 15.9 (H) 11.5 - 14.5 %    Platelets 495 (H) 150 - 450 x10*3/uL   Basic metabolic panel   Result Value Ref Range    Glucose 119 (H) 74 - 99 mg/dL    Sodium 139 136 - 145 mmol/L    Potassium 3.6 3.5 - 5.3 mmol/L    Chloride 105 98 - 107 mmol/L    Bicarbonate 21 21 - 32 mmol/L    Anion Gap 17 10 - 20 mmol/L    Urea Nitrogen 32 (H) 6 - 23 mg/dL    Creatinine 4.77 (H) 0.50 - 1.30 mg/dL    eGFR 15 (L) >60 mL/min/1.73m*2    Calcium 7.5 (L) 8.6 - 10.3 mg/dL   Renal function panel   Result Value Ref Range    Glucose 97 74 - 99 mg/dL    Sodium 140 136 - 145 mmol/L    Potassium 3.6 3.5 - 5.3 mmol/L    Chloride 107 98 - 107 mmol/L    Bicarbonate 23 21 - 32 mmol/L    Anion Gap 14 10 - 20 mmol/L    Urea Nitrogen 34 (H) 6 - 23 mg/dL    Creatinine 3.96 (H) 0.50 - 1.30 mg/dL    eGFR 18 (L) >60 mL/min/1.73m*2    Calcium 7.2 (L) 8.6 - 10.3 mg/dL    Phosphorus 5.1 (H) 2.5 - 4.9 mg/dL    Albumin 2.6 (L) 3.4 - 5.0 g/dL   CBC   Result Value Ref Range    WBC 7.5 4.4 - 11.3 x10*3/uL    nRBC 0.0 0.0 - 0.0 /100 WBCs    RBC 2.61 (L) 4.50 - 5.90 x10*6/uL    Hemoglobin 6.6 (L) 13.5 - 17.5 g/dL    Hematocrit 21.2 (L) 41.0 - 52.0 %    MCV 81 80 - 100 fL    MCH 25.3 (L) 26.0 - 34.0 pg    MCHC 31.1 (L) 32.0 - 36.0 g/dL    RDW 15.9 (H) 11.5 - 14.5 %    Platelets 430 150 - 450 x10*3/uL   Prepare RBC: 1 Units   Result Value Ref Range    PRODUCT CODE R3631B04     Unit Number [id number]     Unit ABO O     Unit RH POS     XM INTEP COMP     Dispense Status XM     Blood Expiration Date June 05, 2024 23:59 EDT     PRODUCT BLOOD TYPE 5100     UNIT VOLUME 280      No results found.                Assessment/Plan   Principal Problem:    Bowel perforation (Multi)  Active Problems:    Intra-abdominal abscess (Multi)    Diverticulitis     Acute kidney injury (CMS-HCC)    Primary hypertension    Kidney function cont to improve  Dc fluids  Having bowel movements  Tolerating oral intake   Does have anemia and did transfuse one unit 5/9/2024     Discharge to home today   Followupw th PCP , renal and surgery         I spent  31 minutes in the professional and overall care of this patient.      Mook Clinton MD

## 2024-05-09 NOTE — PROGRESS NOTES
05/09/24 1030   Discharge Planning   Patient expects to be discharged to: Home with outpt osotmy appt     Message from Dr Clinton that patient needs blood transfusion prior to dc.    ADOD later today/tomorrow  BARRIERS blood transfusion  DISPO home with outpt appts

## 2024-05-09 NOTE — PROGRESS NOTES
James Ramirez is a 44 y.o. male on day 15 of admission presenting with Bowel perforation (Multi).      Subjective   James Ramirez is a 44-year-old male who presented on 4/24 with abdominal discomfort who was found to have an intra-abdominal abscess with localized and viscus perforation.  Smaller abscesses were noted in the abdomen and pelvis extending into the anterior abdominal wall.  These findings were noted on contrast CT imaging.  He had normal renal function upon presentation.  He went for exploratory laparotomy with drainage of the abdominal wall abscess, partial colon resection with diverting end colostomy on 4/24. By 4/27 we noted worsening renal function. Nephrology was consulted for acute kidney injury.     Seen and examined.   NAHUN. Resting comfortably in bed. Appetite remains fair.   Receiving PRBCs.   He does not offer specific complaints.  Chart/labs/meds/notes/imaging/VS reviewed.       Objective          Vitals 24HR  Heart Rate:  [72-93]   Temp:  [36.3 °C (97.3 °F)-37.4 °C (99.3 °F)]   Resp:  [16-18]   BP: (136-152)/(87-97)   Weight:  [74.3 kg (163 lb 12.8 oz)]   SpO2:  [98 %-100 %]     Intake/Output last 3 Shifts:    Intake/Output Summary (Last 24 hours) at 5/9/2024 1406  Last data filed at 5/9/2024 0840  Gross per 24 hour   Intake 240 ml   Output 0 ml   Net 240 ml       Physical Exam  Constitutional:       Appearance: Normal appearance.  In no acute distress.  Eyes:      Conjunctiva/sclera: Conjunctivae normal.   Cardiovascular:      Rate and Rhythm: Normal rate.  Normal S1 and S2.     Pulses: Normal pulses.   Pulmonary:      Effort: Pulmonary effort is normal.   Abdominal:      Comments: Wound vac and colostomy noted producing brown stool  Genitourinary:     Comments: No Batista  Musculoskeletal:         General: Normal range of motion.  No joint swelling.  Skin:     General: Skin is warm.  No rashes or lesions to exposed skin.  Neurological:      Mental Status: He is oriented to person, place, and  time.  Nonfocal.  Psychiatric:         Mood and Affect: Mood normal.         Behavior: Behavior normal.     Scheduled Medications  acetaminophen, 650 mg, nasogastric tube, q6h   Or  acetaminophen, 650 mg, nasogastric tube, q6h   Or  acetaminophen, 650 mg, rectal, q6h  amLODIPine, 10 mg, oral, Daily  diatrizoate iva-diatrizoat sod, 90 mL, oral, Once  docusate sodium, 100 mg, oral, BID  heparin (porcine), 5,000 Units, subcutaneous, q8h LILY  iron sucrose, 200 mg, intravenous, Once  metoprolol tartrate, 25 mg, oral, BID  pantoprazole, 40 mg, intravenous, Daily before breakfast  phenylephrine, 1 spray, Each Nostril, Once  polyethylene glycol, 17 g, oral, Daily      Continuous medications  [Held by provider] lactated Ringer's, 100 mL/hr, Last Rate: Stopped (05/08/24 1538)        PRN medications: benzocaine-menthol, [Held by provider] morphine, naloxone, ondansetron **OR** ondansetron     Relevant Results  Results from last 7 days   Lab Units 05/09/24  0532 05/08/24  1751 05/07/24  0611   WBC AUTO x10*3/uL 7.5 6.6 8.4   HEMOGLOBIN g/dL 6.6* 7.4* 7.0*   HEMATOCRIT % 21.2* 24.0* 23.1*   PLATELETS AUTO x10*3/uL 430 495* 524*     Results from last 7 days   Lab Units 05/09/24  0532 05/08/24  1751 05/07/24  0611   SODIUM mmol/L 140 139 140   POTASSIUM mmol/L 3.6 3.6 4.0   CHLORIDE mmol/L 107 105 107   CO2 mmol/L 23 21 22   BUN mg/dL 34* 32* 37*   CREATININE mg/dL 3.96* 4.77* 5.28*   GLUCOSE mg/dL 97 119* 131*   CALCIUM mg/dL 7.2* 7.5* 7.3*       XR chest abdomen for OG NG placement   Final Result   1.  Enteric tube terminates in the left upper quadrant with side hole   below the GE junction. Gaseous distended loops of bowel in the   visualized upper abdomen may relate to ileus. Attention on follow-up   is advised.   2.  No acute cardiopulmonary process.        MACRO:   None        Signed by: Randolph Gtz 5/1/2024 1:26 AM   Dictation workstation:   [id number]      XR abdomen 1 view   Final Result   Positive contrast material  in nondilated loops of bowel throughout   the abdomen.        MACRO:   None.        Signed by: Armida Helm 4/30/2024 11:02 AM   Dictation workstation:   [id number]      XR abdomen 1 view   Final Result   Postsurgical changes and support devices as above. Multiple distended   loops of large and small bowel.        MACRO:   None.        Signed by: Armida Helm 4/29/2024 1:24 PM   Dictation workstation:   [id number]      US renal complete   Final Result   1.  No hydronephrosis or hydroureter bilaterally.   2. Nonobstructive calculus in the left kidney.   3.  Increased cortical echogenicity within the kidneys bilaterally,   which can be seen with medical renal disease.        Signed by: Ciro Patel 4/28/2024 5:50 PM   Dictation workstation:   [id number]      XR chest 2 views   Final Result   Left basilar infiltrate or atelectasis. Small effusion.        MACRO:   none        Signed by: Carissa Kim 4/29/2024 7:14 AM   Dictation workstation:   [id number]      XR chest 1 view   Final Result   Hypoventilatory exam.        Mild cardiomegaly.        NG tube in place as described.        MACRO:   None        Signed by: Reji Senior 4/26/2024 1:45 PM   Dictation workstation:   [id number]      XR chest 1 view   Final Result   1. No acute cardiopulmonary process.        MACRO:   None.        Signed by: Joleen Pascual 4/24/2024 11:59 AM   Dictation workstation:   [id number]      CT abdomen pelvis w IV contrast   Final Result   1. Central intraabdominal abscess measuring 9.1 x 7.9 x 3.8 cm..   There are multiple dots of air around this larger abscess consistent   with localized perforation and viscus perforation. This is likely   perforated sigmoid diverticulitis. There are smaller abscesses in the   abdomen and pelvis.        2. This abscess extends into the anterior abdominal wall between the   rectus abdominis muscles.        MACRO:   Julianne Meneses discussed the significance and urgency of this   critical finding  by secure chat with Dionisio Salinas and AILYN PANG   on 4/24/2024 at 11:40 am.  (**-RCF-**) Findings:  See findings.        Signed by: Julianne Meneses 4/24/2024 11:41 AM   Dictation workstation:   [id number]               Assessment/Plan      James Ramirez is a 44-year-old male who presented on 4/24 with abdominal discomfort who was found to have an intra-abdominal abscess with localized and viscus perforation.  Smaller abscesses were noted in the abdomen and pelvis extending into the anterior abdominal wall.  These findings were noted on contrast CT imaging.  He had normal renal function upon presentation.  He went for exploratory laparotomy with drainage of the abdominal wall abscess, partial colon resection with diverting end colostomy on 4/24.  By 4/27 we noted worsening renal function.  Nephrology was consulted for acute kidney injury.    Mr. Ramirez creatinine peaked at 7.5 mg a deciliter by 5/25.  Fortunately he did not require renal replacement therapy.  His creatinine is slowly improving.  Acute kidney injury is due to acute tubular necrosis.  He was noted to have an elevated vancomycin levels.  He did receive iodinated contrast with his creatinine rise potentially around the 48-hour rg and may be a potential culprit.  He had nearly a 4 g drop in his hemoglobin in the postoperative period with likely a hemodynamic component albeit no true hypotension documented.  He is no longer on antimicrobial coverage.  Blood pressures are holding steady.  He is voiding on his own and is nonoliguric.  There has been no indication for RRT. I have stopped IV fluids as this will not improve his renal function. Of note, TP:Cr ratio with proteinuria is in the setting of ATN. This can be repeated as his renal function improves. Fortunately his creatinine continues to downtrend. We will allow some mild permissive hypertension while his renal function is improving. He is receiving a transfusion. I have reviewed his iron stores  and ferritin. I will give him IV venofer and a dose of erythropoietin. Otherwise, he understands that he must avoid anti-inflammatories upon discharge.  He will need very close renal follow-up with a weekly RFP until we see significant renal function improvement. We are avoiding nephrotoxins. Will follow. We will schedule him outpatient to be seen in the office - [phone].    Principal Problem:    Bowel perforation (Multi)  Active Problems:    Intra-abdominal abscess (Multi)    Diverticulitis    Acute kidney injury (CMS-HCC)    Primary hypertension         I spent 40 minutes in the professional and overall care of this patient.      Eulogio Gandhi, DO

## 2024-05-09 NOTE — DISCHARGE SUMMARY
Discharge Diagnosis  Bowel perforation (Multi)    Issues Requiring Follow-Up  Colostomy     Discharge Meds     Your medication list        START taking these medications        Instructions Last Dose Given Next Dose Due   acetaminophen 325 mg tablet  Commonly known as: Tylenol      2 tablets (650 mg) by nasogastric tube route every 6 hours.       amLODIPine 10 mg tablet  Commonly known as: Norvasc  Start taking on: May 10, 2024      Take 1 tablet (10 mg) by mouth once daily.       docusate sodium 100 mg capsule  Commonly known as: Colace      Take 1 capsule (100 mg) by mouth 2 times a day.       metoprolol tartrate 25 mg tablet  Commonly known as: Lopressor      Take 1 tablet (25 mg) by mouth 2 times a day.       ondansetron 4 mg tablet  Commonly known as: Zofran      Take 1 tablet (4 mg) by mouth every 6 hours if needed for nausea or vomiting.       pantoprazole 40 mg EC tablet  Commonly known as: ProtoNix      Take 1 tablet (40 mg) by mouth once daily. Do not crush, chew, or split.       polyethylene glycol 17 gram packet  Commonly known as: Glycolax, Miralax  Start taking on: May 10, 2024      Take 17 g by mouth once daily.                 Where to Get Your Medications        These medications were sent to [id number] - [place], [place] - [address] [address] AT [address] [address]      Phone: [phone]   amLODIPine 10 mg tablet  metoprolol tartrate 25 mg tablet  ondansetron 4 mg tablet  pantoprazole 40 mg EC tablet  polyethylene glycol 17 gram packet       Information about where to get these medications is not yet available    Ask your nurse or doctor about these medications  acetaminophen 325 mg tablet  docusate sodium 100 mg capsule         Test Results Pending At Discharge  Pending Labs       No current pending labs.            Hospital Course   Pt admitted to hospital for bowel perforation , diverticulitis and underwent surgery and did have  colostomy , he was treated with antibiotics and completed course on 5/7/24  Pt was seen by nephrology for katie and his creatinine is improving,   Pt will followup with surgery , renal and PCP    Pertinent Physical Exam At Time of Discharge      Outpatient Follow-Up  Future Appointments   Date Time Provider Department Center   5/10/2024 10:00 AM Preeti Maier RN [id number]     Time > 30 min in discharge planning      Mook Clinton MD

## 2024-05-10 ENCOUNTER — APPOINTMENT (OUTPATIENT)
Dept: SURGERY | Facility: CLINIC | Age: 44
End: 2024-05-10
Payer: COMMERCIAL

## 2024-05-11 LAB
BLOOD EXPIRATION DATE: NORMAL
DISPENSE STATUS: NORMAL
PRODUCT BLOOD TYPE: 5100
PRODUCT CODE: NORMAL
UNIT ABO: NORMAL
UNIT NUMBER: NORMAL
UNIT RH: NORMAL
UNIT VOLUME: 287
XM INTEP: NORMAL

## 2024-05-17 ENCOUNTER — CLINICAL SUPPORT (OUTPATIENT)
Dept: SURGERY | Facility: CLINIC | Age: 44
End: 2024-05-17
Payer: COMMERCIAL

## 2024-05-17 DIAGNOSIS — Z43.3 COLOSTOMY CARE (MULTI): ICD-10-CM

## 2024-05-17 PROCEDURE — 99211 OFF/OP EST MAY X REQ PHY/QHP: CPT | Performed by: SURGERY

## 2024-05-17 NOTE — NURSING NOTE
"[facility] nursing visit outcome: Midline wound and LUQ stoma were assessed. Care and recommendations were provided as detailed below.      [facility] next scheduled visit/plan: Wednesday, May 22 following his appointment with Dr. Salinas     Stoma Type: Colostomy  Del: No  Diameter: 1 1/2\"  Location: LUQ  Protrusion: Budded  Mucosal Condition and Color: Moist, Red  Mucocutaneous Junction: Intact  Peristomal Skin: Clear, intact  Location of Skin Impairment: n/a  Peristomal Contour: Flat  Supportive Tissue: Semi-Soft  Character of Output: Pasty Stool  Emptying Frequency: about 1-2 per day  Removed/Current Pouching System: [employer] Premier [address]01[address] 1-piece drainable pouch with barrier ring  Current Wearing Time: was placed earlier today, removed for assessment    Recommendations:   Skin Care: no additional care at this time  Pouching System: applied: 2 3/4\" [employer] New Image soft convex wafer with tape collar - aperture cut off-center to accommodate the midline steri-strips, barrier ring, closed pouch  Wear Time: wafer for 3-7 Days; change the pouch as needed - about twice per day  Other: the 2 1/4\" size will be a better fit - [facility] RN will work with [employer] re getting Mr. Ramirez enrolled in program for those without insurance or with insurance that doesn't cover ostomy supplies    Photo: 5/17/2024     Incision: see photo above, staples were removed today per order from service; distal wound is open, measuring 5 x 2 x 1.2 cm, base is moist, red, with some hypergranulation tissue.  Care: Rinsed with Vashe, silver nitrate applied to hypergranulated tissue; filled with 2 layers of Aquacel silver, secured with Mepilex dressing. Recommend changing dressing and cleaning wound daily.     Supplier:  as noted above, working with [employer] to obtain supplies.    Comments: 4 wafers, rings, and samples of closed and drainable pouches were provided for Mr. Ramirez to use until he can obtain supplies from [employer]    Time Increment: 60 " minutes    Preeti Maier, CHIDIN, RN, CWOCN  With CHIDI BrightN, RN, [facility], CWOCN

## 2024-05-22 ENCOUNTER — CLINICAL SUPPORT (OUTPATIENT)
Dept: SURGERY | Facility: CLINIC | Age: 44
End: 2024-05-22
Payer: COMMERCIAL

## 2024-05-22 ENCOUNTER — OFFICE VISIT (OUTPATIENT)
Dept: SURGERY | Facility: CLINIC | Age: 44
End: 2024-05-22
Payer: COMMERCIAL

## 2024-05-22 VITALS
DIASTOLIC BLOOD PRESSURE: 87 MMHG | HEART RATE: 78 BPM | HEIGHT: 72 IN | BODY MASS INDEX: 19.77 KG/M2 | TEMPERATURE: 97.8 F | SYSTOLIC BLOOD PRESSURE: 136 MMHG | WEIGHT: 146 LBS

## 2024-05-22 DIAGNOSIS — Z43.3 COLOSTOMY CARE (MULTI): ICD-10-CM

## 2024-05-22 DIAGNOSIS — T81.30XA WOUND DEHISCENCE: ICD-10-CM

## 2024-05-22 DIAGNOSIS — K57.32 DIVERTICULITIS OF COLON: Primary | ICD-10-CM

## 2024-05-22 PROCEDURE — 99211 OFF/OP EST MAY X REQ PHY/QHP: CPT | Performed by: SURGERY

## 2024-05-22 PROCEDURE — 3075F SYST BP GE 130 - 139MM HG: CPT | Performed by: SURGERY

## 2024-05-22 PROCEDURE — 1036F TOBACCO NON-USER: CPT | Performed by: SURGERY

## 2024-05-22 PROCEDURE — 99024 POSTOP FOLLOW-UP VISIT: CPT | Performed by: SURGERY

## 2024-05-22 PROCEDURE — 3079F DIAST BP 80-89 MM HG: CPT | Performed by: SURGERY

## 2024-05-22 RX ORDER — SILVER NITRATE 38.21; 12.74 MG/1; MG/1
STICK TOPICAL ONCE
Status: SHIPPED | OUTPATIENT
Start: 2024-05-22

## 2024-05-22 ASSESSMENT — PAIN SCALES - GENERAL: PAINLEVEL: 0-NO PAIN

## 2024-05-22 ASSESSMENT — ENCOUNTER SYMPTOMS: DEPRESSION: 0

## 2024-05-22 NOTE — PROGRESS NOTES
Assessment/Plan   Status post left colon resection with diverting end colostomy.  Pathology report suggest benign disease.  This very difficult operation with extreme distortion of anatomy.  He will need further workup once he is a little further out from surgery.  Will need a colonoscopy and possible additional imaging to help plan for surgery down the road for intestinal reconstitution.  He will see me in 1 month    Subjective   James following up after recent emergency surgery for turns out to be perforated diverticulitis eroding into abdominal wall, possible colovesical fistula.  He is doing pretty well.  Stoma has been functional.  Appetite starting to come back.       Objective     Physical Exam  NAD  A&Ox3  Non icteric  CTA  RR  Abdomen soft min tender. Wounds clean, intact.  Lower midline wound where he used to have a wound VAC is granulated in well.  There is some hypertrophic granulation tissue but no signs of infection.  Stoma is healthy appearing  Extremities warm, well perfused         Relevant Results      No results found for this or any previous visit (from the past 24 hour(s)).        I spent 25 minutes in the professional and overall care of this patient.      Dionisio Salinas MD

## 2024-05-22 NOTE — Clinical Note
May 22, 2024       No Recipients    Patient: James Ramirez   YOB: 1980   Date of Visit: 5/22/2024       Dear Dr. Springer Recipients:    Thank you for referring James Ramirez to me for evaluation. Below are my notes for this consultation.  If you have questions, please do not hesitate to call me. I look forward to following your patient along with you.       Sincerely,     Dionisio Salinas MD      CC:   No Recipients  ______________________________________________________________________________________      Assessment/Plan  Status post left colon resection with diverting end colostomy.  Pathology report suggest benign disease.  This very difficult operation with extreme distortion of anatomy.  He will need further workup once he is a little further out from surgery.  Will need a colonoscopy and possible additional imaging to help plan for surgery down the road for intestinal reconstitution.  He will see me in 1 month    Subjective  James following up after recent emergency surgery for turns out to be perforated diverticulitis eroding into abdominal wall, possible colovesical fistula.  He is doing pretty well.  Stoma has been functional.  Appetite starting to come back.       Objective    Physical Exam  NAD  A&Ox3  Non icteric  CTA  RR  Abdomen soft min tender. Wounds clean, intact.  Lower midline wound where he used to have a wound VAC is granulated in well.  There is some hypertrophic granulation tissue but no signs of infection.  Stoma is healthy appearing  Extremities warm, well perfused         Relevant Results      No results found for this or any previous visit (from the past 24 hour(s)).        I spent 25 minutes in the professional and overall care of this patient.      Dionisio Salinas MD

## 2024-05-22 NOTE — NURSING NOTE
"[facility] nursing visit outcome: Wound was assessed and care provided as detailed below. Stoma and pouching system were assessed.     [facility] next scheduled visit/plan: to return on Friday, May 31    Stoma Type: End Colostomy  Del: No  Diameter: 1 3/8\"  Location: LUQ  Protrusion: Budded  Mucosal Condition and Color: Moist, Red  Mucocutaneous Junction: Intact  Peristomal Skin: Clear, intact  Location of Skin Impairment: n/a  Peristomal Contour: Flat  Supportive Tissue: Semi-Soft  Character of Output: Slightly Thick and Formed Stool  Emptying Frequency: about 2 per day  Removed/Current Pouching System: 2 3/4\" [employer] New Image soft convex, barrier ring, closed pouch (uses lock n' roll at times at home)  Current Wearing Time: has been on for 5 Days (goal of wearing wafer for up to 7 days)    Recommendations:   Skin Care: stoma powder to denuded skin as needed with pouch change (not needed today)  Pouching System: as above - can decrease to 2 1/4\" soft convex system when supplies are ordered.  Wear Time: up to 7 Days    Photo: 5/22/2024     Incision: measures 4.5 x 1 x 0.6 cm; moist, red, some hypergranulation. Wound was cleaned with NS and hypergranulation tissue was treated with silver nitrate.   Periwound was also cleaned.   Wound was dressed with Aquacel Ag and this was secured with Mepilex 4x6 dressing.       Supplier:  [employer] will be contacted to assist through their program for un/under-insured patients    Comments: supplies were provided for wound and ostomy, as Mr. Ramirez does not have [facility]    Time Increment: 60 minutes    Preeti Maier, CHIDIN, RN, CWOCN   With CHIDI BrightN, RN, [facility], CWOCN      "

## 2024-05-23 ENCOUNTER — APPOINTMENT (OUTPATIENT)
Dept: PRIMARY CARE | Facility: CLINIC | Age: 44
End: 2024-05-23
Payer: COMMERCIAL

## 2024-05-31 ENCOUNTER — CLINICAL SUPPORT (OUTPATIENT)
Dept: SURGERY | Facility: CLINIC | Age: 44
End: 2024-05-31
Payer: COMMERCIAL

## 2024-05-31 DIAGNOSIS — Z43.3 COLOSTOMY CARE (MULTI): ICD-10-CM

## 2024-05-31 PROCEDURE — 99211 OFF/OP EST MAY X REQ PHY/QHP: CPT | Performed by: SURGERY

## 2024-05-31 PROCEDURE — 2500000001 HC RX 250 WO HCPCS SELF ADMINISTERED DRUGS (ALT 637 FOR MEDICARE OP): Performed by: COLON & RECTAL SURGERY

## 2024-05-31 RX ORDER — SILVER NITRATE 38.21; 12.74 MG/1; MG/1
STICK TOPICAL ONCE
Status: COMPLETED | OUTPATIENT
Start: 2024-05-31 | End: 2024-05-31

## 2024-05-31 RX ADMIN — SILVER NITRATE APPLICATORS 1 APPLICATION: 25; 75 STICK TOPICAL at 14:40

## 2024-05-31 NOTE — NURSING NOTE
"[facility] nursing visit outcome: Mr. Ramirez returned for wound and stoma assessment and care. Wound is nearly healed. Mr. Ramirez' insurance will not cover his ostomy supplies. He has begun purchasing some from [employer]. Additional samples were also provided to him today.      [facility] next scheduled visit/plan: to return in about 2 weeks (June 14)    Stoma Type: End Colostomy  Del: No  Diameter: close to 1 1/2\"  Location: LUQ  Protrusion: Budded  Mucosal Condition and Color: Moist, Red  Mucocutaneous Junction: Intact  Peristomal Skin: Irritant Dermatitis/Denuded  Location of Skin Impairment: along inferior side of stoma  Peristomal Contour: Flat  Supportive Tissue: Semi-Soft  Character of Output: Pasty Stool  Emptying Frequency: not assessed today  Removed/Current Pouching System: 2-piece flat wafer (aperture was cut too large for current stoma size) with white tape collar, drainable, opaque pouch (uncertain brand)  Current Wearing Time: placed earlier today - seal was intact, but pouch was removed for stoma and skin assessment    Recommendations:   Skin Care: stoma powder to denuded skin as needed with pouch change; peristomal hair was also clipped today  Pouching System: applied [employer] Premier soft convex [id number] pouch  Wear Time: 3-4 Days  Other: provided samples of [employer] deodorizing lubricant    Photo: 5/31/2024     Incision: See photo - nearly closed, with some moist tissue at center (measures 2 x 0.3 cm) - after area was cleaned and dried, it  was treated with silver nitrate; stoma powder was applied to surface, covered with 3 x3 Mepilex foam border dressing    Supplier:  [employer]  - Mr. Ramirez is not sure why her doesn't qualify for the [employer] program. [facility] RN will inquire. He was provided with a list of low cost suppliers.    Comments: Will see Dr. Salinas on June 12.    Time Increment: 60 minutes    Preeti Maier, CHIDIN, RN, CWOCN   With CHIDI BrightN, RN, C, CWOCN      "

## 2024-06-12 ENCOUNTER — OFFICE VISIT (OUTPATIENT)
Dept: SURGERY | Facility: CLINIC | Age: 44
End: 2024-06-12
Payer: COMMERCIAL

## 2024-06-12 VITALS
DIASTOLIC BLOOD PRESSURE: 97 MMHG | BODY MASS INDEX: 21.21 KG/M2 | HEART RATE: 76 BPM | HEIGHT: 72 IN | TEMPERATURE: 97.8 F | SYSTOLIC BLOOD PRESSURE: 142 MMHG | WEIGHT: 156.6 LBS

## 2024-06-12 DIAGNOSIS — K57.20 DIVERTICULITIS OF LARGE INTESTINE WITH PERFORATION AND ABSCESS WITHOUT BLEEDING: ICD-10-CM

## 2024-06-12 DIAGNOSIS — Z09 SURGERY FOLLOW-UP: Primary | ICD-10-CM

## 2024-06-12 PROCEDURE — 1036F TOBACCO NON-USER: CPT | Performed by: SURGERY

## 2024-06-12 PROCEDURE — 3077F SYST BP >= 140 MM HG: CPT | Performed by: SURGERY

## 2024-06-12 PROCEDURE — 3080F DIAST BP >= 90 MM HG: CPT | Performed by: SURGERY

## 2024-06-12 PROCEDURE — 99024 POSTOP FOLLOW-UP VISIT: CPT | Performed by: SURGERY

## 2024-06-12 ASSESSMENT — ENCOUNTER SYMPTOMS: DEPRESSION: 0

## 2024-06-12 ASSESSMENT — PAIN SCALES - GENERAL: PAINLEVEL: 0-NO PAIN

## 2024-06-12 NOTE — PROGRESS NOTES
Assessment/Plan   James is now about 7 weeks out from diverting Gardner's procedure for perforated diverticulitis.  Pathology was benign.  He is going to need a colonoscopy this summer before we consider him for colostomy reversal.  Office filled out forms for short-term disability.    Subjective   James is doing well.  Appetite improving he has gained about 15 pounds.       Objective     Physical Exam  NAD  A&Ox3  Non icteric  CTA  RR  Abdomen soft min tender. Wounds clean, intact.  Stoma healthy appearing and functional.  Complete healing of his midline wound  Extremities warm, well perfused         Relevant Results      No results found for this or any previous visit (from the past 24 hour(s)).    Surgical Pathology Exam: [id number]  Order: [id number]   Collected 4/24/2024 17:34       Status: Final result       Visible to patient: Yes (not seen)       Dx: Bowel perforation (Multi)    0 Result Notes       Component  Resulting Agency   FINAL DIAGNOSIS   A. COLON - SIGMOID RESECTION WITH LYMPH NODES:   -Colon with area of marked acute inflammation and fibrous proliferation, consistent with history of perforation  -Diverticular disease  -Seven pericolonic lymph nodes with reactive changes     Note: The three small polyps are due to prominent lymphoid aggregates.   Electronically signed by Amalia Gardiner MD PhD on 5/8/2024 at [address] [place] [address]   By the signature on this report, the individual or group listed as making the Final Interpretation/Diagnosis certifies that they have reviewed this case.    Clinical History  [facility]   Pre-op diagnosis:  Bowel perforation (Multi) [K63.1]          I spent 25 minutes in the professional and overall care of this patient.      Dionisio Salinas MD

## 2024-06-12 NOTE — Clinical Note
June 12, 2024       No Recipients    Patient: James Ramirez   YOB: 1980   Date of Visit: 6/12/2024       Dear Dr. Springer Recipients:    Thank you for referring James Ramirez to me for evaluation. Below are my notes for this consultation.  If you have questions, please do not hesitate to call me. I look forward to following your patient along with you.       Sincerely,     Dionisio Salinas MD      CC:   No Recipients  ______________________________________________________________________________________    Assessment/Plan  James is now about 7 weeks out from diverting Gardner's procedure for perforated diverticulitis.  Pathology was benign.  He is going to need a colonoscopy this summer before we consider him for colostomy reversal.  Office filled out forms for short-term disability.    Subjective  James is doing well.  Appetite improving he has gained about 15 pounds.       Objective    Physical Exam  NAD  A&Ox3  Non icteric  CTA  RR  Abdomen soft min tender. Wounds clean, intact.  Stoma healthy appearing and functional.  Complete healing of his midline wound  Extremities warm, well perfused         Relevant Results      No results found for this or any previous visit (from the past 24 hour(s)).    Surgical Pathology Exam: [id number]  Order: [id number]   Collected 4/24/2024 17:34       Status: Final result       Visible to patient: Yes (not seen)       Dx: Bowel perforation (Multi)    0 Result Notes       Component  Resulting Agency   FINAL DIAGNOSIS   A. COLON - SIGMOID RESECTION WITH LYMPH NODES:   -Colon with area of marked acute inflammation and fibrous proliferation, consistent with history of perforation  -Diverticular disease  -Seven pericolonic lymph nodes with reactive changes     Note: The three small polyps are due to prominent lymphoid aggregates.   Electronically signed by Amalia Gardiner MD PhD on 5/8/2024 at [address] [place] [address]   By the signature on this report, the individual or group listed as  making the Final Interpretation/Diagnosis certifies that they have reviewed this case.    Clinical History  [facility]   Pre-op diagnosis:  Bowel perforation (Multi) [K63.1]          I spent 25 minutes in the professional and overall care of this patient.      Dionisio Salinas MD

## 2024-06-14 ENCOUNTER — CLINICAL SUPPORT (OUTPATIENT)
Dept: SURGERY | Facility: CLINIC | Age: 44
End: 2024-06-14
Payer: COMMERCIAL

## 2024-06-14 DIAGNOSIS — Z43.3 COLOSTOMY CARE (MULTI): ICD-10-CM

## 2024-06-15 NOTE — NURSING NOTE
"[facility] nursing visit outcome: Mr. Ramirez returned for wound and stoma assessment and care. Epithelization of midline incision noted.   Mr. Ramirez' insurance will not cover his ostomy supplies. He has begun purchasing some from [employer]. Additional samples were also provided to him today.       [facility] next scheduled visit/plan: to return in about 2 weeks 6/28     Stoma Type: End Colostomy  Del: No  Diameter: close to 1 1/2\" oval   Location: LUQ  Protrusion: Budded  Mucosal Condition and Color: Moist, Red  Mucocutaneous Junction: Intact  Peristomal Skin: Irritant Dermatitis/Denuded  Location of Skin Impairment: along inferior side of stoma  Peristomal Contour: Flat  Supportive Tissue: Semi-Soft  Character of Output: Pasty Stool  Emptying Frequency: 1 -2 times a day   Removed/Current Pouching System: removed 2 piece 2 3/4 soft convex pouching system with lock and roll pouch. Applied I piece soft convex pouch with lock and roll pouch.      Recommendations:   Skin Care: stoma powder to denuded skin as needed with pouch change; peristomal hair was also clipped today  Pouching System: applied 1 piece Kervin Premier soft convex 93125 pouch  Wear Time: 3-4 Days     Photo: 6/14/2024            Incision: See photo. Fully epithelize with some sensitively. Patient provided with coloplast medium belt to support abdomen.     Wendi MURILLON RN [facility] CWOCN  [phone]       "

## 2024-06-17 PROBLEM — D50.9 IRON DEFICIENCY ANEMIA: Status: ACTIVE | Noted: 2022-06-28

## 2024-06-17 PROBLEM — K57.32 DIVERTICULITIS OF LARGE INTESTINE WITH COMPLICATION: Status: ACTIVE | Noted: 2022-06-28

## 2024-06-17 PROBLEM — R10.11 RUQ ABDOMINAL PAIN: Status: ACTIVE | Noted: 2022-06-28

## 2024-06-17 PROBLEM — K76.89 HEPATIC CYST: Status: ACTIVE | Noted: 2022-06-28

## 2024-06-17 PROBLEM — E87.6 HYPOKALEMIA: Status: ACTIVE | Noted: 2022-06-29

## 2024-06-19 ENCOUNTER — LAB (OUTPATIENT)
Dept: LAB | Facility: LAB | Age: 44
End: 2024-06-19
Payer: COMMERCIAL

## 2024-06-19 DIAGNOSIS — N17.9 AKI (ACUTE KIDNEY INJURY) (CMS-HCC): ICD-10-CM

## 2024-06-19 DIAGNOSIS — D50.8 OTHER IRON DEFICIENCY ANEMIA: Primary | ICD-10-CM

## 2024-06-19 DIAGNOSIS — D50.8 OTHER IRON DEFICIENCY ANEMIA: ICD-10-CM

## 2024-06-19 DIAGNOSIS — K63.1 BOWEL PERFORATION (MULTI): ICD-10-CM

## 2024-06-19 LAB
ALBUMIN SERPL BCP-MCNC: 4.2 G/DL (ref 3.4–5)
ANION GAP SERPL CALC-SCNC: 16 MMOL/L (ref 10–20)
BUN SERPL-MCNC: 15 MG/DL (ref 6–23)
CALCIUM SERPL-MCNC: 9.2 MG/DL (ref 8.6–10.3)
CHLORIDE SERPL-SCNC: 102 MMOL/L (ref 98–107)
CO2 SERPL-SCNC: 26 MMOL/L (ref 21–32)
CREAT SERPL-MCNC: 1.04 MG/DL (ref 0.5–1.3)
EGFRCR SERPLBLD CKD-EPI 2021: >90 ML/MIN/1.73M*2
ERYTHROCYTE [DISTWIDTH] IN BLOOD BY AUTOMATED COUNT: 17.7 % (ref 11.5–14.5)
GLUCOSE SERPL-MCNC: 82 MG/DL (ref 74–99)
HCT VFR BLD AUTO: 32.1 % (ref 41–52)
HGB BLD-MCNC: 10.9 G/DL (ref 13.5–17.5)
MCH RBC QN AUTO: 27.5 PG (ref 26–34)
MCHC RBC AUTO-ENTMCNC: 34 G/DL (ref 32–36)
MCV RBC AUTO: 81 FL (ref 80–100)
NRBC BLD-RTO: 0 /100 WBCS (ref 0–0)
PHOSPHATE SERPL-MCNC: 3.7 MG/DL (ref 2.5–4.9)
PLATELET # BLD AUTO: 280 X10*3/UL (ref 150–450)
POTASSIUM SERPL-SCNC: 4.1 MMOL/L (ref 3.5–5.3)
RBC # BLD AUTO: 3.96 X10*6/UL (ref 4.5–5.9)
SODIUM SERPL-SCNC: 140 MMOL/L (ref 136–145)
WBC # BLD AUTO: 6.8 X10*3/UL (ref 4.4–11.3)

## 2024-06-19 PROCEDURE — 80069 RENAL FUNCTION PANEL: CPT

## 2024-06-19 PROCEDURE — 36415 COLL VENOUS BLD VENIPUNCTURE: CPT

## 2024-06-19 PROCEDURE — 85027 COMPLETE CBC AUTOMATED: CPT

## 2024-06-28 ENCOUNTER — CLINICAL SUPPORT (OUTPATIENT)
Dept: SURGERY | Facility: CLINIC | Age: 44
End: 2024-06-28
Payer: COMMERCIAL

## 2024-06-28 DIAGNOSIS — Z43.3 COLOSTOMY CARE (MULTI): ICD-10-CM

## 2024-06-28 PROCEDURE — 99211 OFF/OP EST MAY X REQ PHY/QHP: CPT | Performed by: SURGERY

## 2024-06-28 NOTE — NURSING NOTE
"[facility] nursing visit outcome: Stoma and pouching system were assessed. We discussed adjusting to the colostomy and attending events, such as an upcoming wrestling event at [facility].      [facility] next scheduled visit/plan: Mr. Ramirez will call as needed    Stoma Type: End Colostomy  Del: No  Diameter: about 1 3/8\" slightly oval  Location: LUQ  Protrusion: Budded  Mucosal Condition and Color: Moist, Red  Mucocutaneous Junction: Intact  Peristomal Skin: Clear, intact  Location of Skin Impairment: n/a  Peristomal Contour: Rounded  Supportive Tissue: Semi-Soft  Character of Output: Brown and Pasty Stool  Emptying Frequency: about 2 times per day   Removed/Current Pouching System: 2-piece pouch with flat wafer, tape collar and opaque drainable pouch  Current Wearing Time: wears wafer for a few days, but changes the pouch with each BM - about 2 times per Day    Recommendations:   Skin Care: stoma powder to denuded skin as needed with pouch change - not needed at this time  Pouching System: applied similar pouch today: 2 1/4\" Kervin [employer] solid flat wafer cut to 1 3/8\",  lock n' roll pouch  Wear Time: wafer for 4-7 days  Other: 1. Cassie Hancock will be advised of the pouches and wafers that will work best for Mr. Ramirez (he has spoken with her previously)  2. High output pouches were provided to use when he is taking bowel prep before colonoscopy (not yet scheduled)    Incision: healed scar    Supplier: [employer]    Comments: 1. Occasionally has pain after eating - high up in abdomen; it resolves; 2. Experiences some pain at times just before stoma functions (happened today during stoma care) 3. Reviewed GI system using diagram to explain where his stoma is located; 4. Showed photos of some people who live openly with permanent stomas (e.g. Roge Florence, a model who has 2 ostomies) & also the new [employer] character that has a pouch and a midline scar    Time Increment: 60 minutes    CHIDI JamesN, RN, " CWOCN

## 2024-07-09 ENCOUNTER — TELEPHONE (OUTPATIENT)
Dept: SURGERY | Facility: CLINIC | Age: 44
End: 2024-07-09
Payer: COMMERCIAL

## 2024-07-09 NOTE — TELEPHONE ENCOUNTER
Patient called to inform me that his job did receive his FMLA forms. He said to hold off on form that he dropped off today.

## 2024-07-09 NOTE — TELEPHONE ENCOUNTER
Patient called stating that he have forms to drop off for FMLA. I informed patient to make sure that he fill out his portion and sign for authorization to release medical information. Patient understood. This form will have a maury number unlike the previous patient states.

## 2024-07-18 ENCOUNTER — OFFICE VISIT (OUTPATIENT)
Dept: SURGERY | Facility: CLINIC | Age: 44
End: 2024-07-18
Payer: COMMERCIAL

## 2024-07-18 VITALS
BODY MASS INDEX: 21.29 KG/M2 | WEIGHT: 157.2 LBS | TEMPERATURE: 97.3 F | SYSTOLIC BLOOD PRESSURE: 150 MMHG | DIASTOLIC BLOOD PRESSURE: 88 MMHG | HEART RATE: 62 BPM | HEIGHT: 72 IN

## 2024-07-18 DIAGNOSIS — K57.20 DIVERTICULITIS OF LARGE INTESTINE WITH PERFORATION WITHOUT BLEEDING: ICD-10-CM

## 2024-07-18 DIAGNOSIS — Z09 SURGERY FOLLOW-UP: Primary | ICD-10-CM

## 2024-07-18 PROCEDURE — 1036F TOBACCO NON-USER: CPT | Performed by: SURGERY

## 2024-07-18 PROCEDURE — 3077F SYST BP >= 140 MM HG: CPT | Performed by: SURGERY

## 2024-07-18 PROCEDURE — 3079F DIAST BP 80-89 MM HG: CPT | Performed by: SURGERY

## 2024-07-18 PROCEDURE — 99211 OFF/OP EST MAY X REQ PHY/QHP: CPT | Performed by: SURGERY

## 2024-07-18 ASSESSMENT — ENCOUNTER SYMPTOMS: DEPRESSION: 0

## 2024-07-18 NOTE — PROGRESS NOTES
Assessment/Plan   Needs to get a colonoscopy.  I resubmitted the request for this.  Once the colostomy is completed we can make arrangements for possible colostomy reversal later in the year    Subjective   James is doing well.  He is gained another 10 pounds.  Not having any issues with his stoma       Objective     Physical Exam  NAD  A&Ox3  Non icteric  CTA  RR  Abdomen soft min tender. Wounds clean, intact.  Colostomy functional and healthy appearing  Extremities warm, well perfused         Relevant Results      No results found for this or any previous visit (from the past 24 hour(s)).        I spent 25 minutes in the professional and overall care of this patient.      Dionisio Salinas MD

## 2024-10-15 ENCOUNTER — APPOINTMENT (OUTPATIENT)
Dept: GASTROENTEROLOGY | Facility: HOSPITAL | Age: 44
End: 2024-10-15
Payer: COMMERCIAL

## 2024-10-18 ENCOUNTER — APPOINTMENT (OUTPATIENT)
Dept: SURGERY | Facility: CLINIC | Age: 44
End: 2024-10-18
Payer: COMMERCIAL

## 2025-01-14 ENCOUNTER — APPOINTMENT (OUTPATIENT)
Dept: GASTROENTEROLOGY | Facility: HOSPITAL | Age: 45
End: 2025-01-14
Payer: COMMERCIAL

## 2025-01-28 ENCOUNTER — APPOINTMENT (OUTPATIENT)
Dept: GASTROENTEROLOGY | Facility: HOSPITAL | Age: 45
End: 2025-01-28
Payer: COMMERCIAL

## 2025-02-25 ENCOUNTER — OFFICE VISIT (OUTPATIENT)
Dept: GASTROENTEROLOGY | Facility: HOSPITAL | Age: 45
End: 2025-02-25
Payer: COMMERCIAL

## 2025-02-25 VITALS — HEART RATE: 76 BPM | OXYGEN SATURATION: 98 % | WEIGHT: 170 LBS | BODY MASS INDEX: 23.03 KG/M2 | HEIGHT: 72 IN

## 2025-02-25 DIAGNOSIS — K57.20 DIVERTICULITIS OF LARGE INTESTINE WITH PERFORATION AND ABSCESS WITHOUT BLEEDING: ICD-10-CM

## 2025-02-25 DIAGNOSIS — Z43.3 COLOSTOMY CARE: ICD-10-CM

## 2025-02-25 DIAGNOSIS — K57.32 DIVERTICULITIS OF LARGE INTESTINE WITH COMPLICATION: Primary | ICD-10-CM

## 2025-02-25 PROCEDURE — 99214 OFFICE O/P EST MOD 30 MIN: CPT

## 2025-02-25 PROCEDURE — 3008F BODY MASS INDEX DOCD: CPT

## 2025-02-25 PROCEDURE — 99204 OFFICE O/P NEW MOD 45 MIN: CPT

## 2025-02-25 RX ORDER — SODIUM, POTASSIUM,MAG SULFATES 17.5-3.13G
1 SOLUTION, RECONSTITUTED, ORAL ORAL ONCE
Qty: 1 EACH | Refills: 0 | Status: SHIPPED | OUTPATIENT
Start: 2025-02-25 | End: 2025-02-25

## 2025-02-25 ASSESSMENT — ENCOUNTER SYMPTOMS
TROUBLE SWALLOWING: 0
RECTAL PAIN: 0
BLOOD IN STOOL: 0
COUGH: 0
SHORTNESS OF BREATH: 0
CHILLS: 0
FATIGUE: 0
FEVER: 0
ANAL BLEEDING: 0
APPETITE CHANGE: 0
CONSTIPATION: 0
ABDOMINAL DISTENTION: 0
NAUSEA: 0
VOMITING: 0
DIARRHEA: 0
ABDOMINAL PAIN: 0

## 2025-02-25 NOTE — PROGRESS NOTES
Subjective     History of Present Illness:   James Ramirez is a 45 y.o. male with PMHx of diverticulitis w/ abscess and perforation who presents to GI clinic for further evaluation of colonoscopy    Today,  5/2024 diverticulitis w/ abscess and bowel perforation, exploratory lap,  abscess drainage, left colon resection, w/ diverting end colostomy.  Seen by surgery 7/2024 - plan for colonoscopy prior to possible colostomy reversal  Patient states he is emptying colostomy bag about 3 times daily with normal brown stool.  Has not been eating as much because he does not want to empty bag. Has some mucous discharge from rectum.  Denies constipation, diarrhea, dyspepsia, melena, hematochezia, dysphagia, unintentional weight loss    Social ETOH, denies cigarette smoking, daily marijuana  Denies fxh GI cancer or IBD  Abdominal Surgeries: exploratory lap, abscess drainage, left colon resection, w/ diverting end colostomy    Denies colonoscopy   Denies EGD       Past Medical History  As per HPI.     Social History  he  reports that he has never smoked. He has never been exposed to tobacco smoke. He has never used smokeless tobacco. He reports current drug use. Drug: Marijuana.     Family History  his family history is not on file.     Review of Systems  Review of Systems   Constitutional:  Negative for appetite change, chills, fatigue and fever.   HENT:  Negative for trouble swallowing.    Respiratory:  Negative for cough and shortness of breath.    Gastrointestinal:  Negative for abdominal distention, abdominal pain, anal bleeding, blood in stool, constipation, diarrhea, nausea, rectal pain and vomiting.       Allergies  No Known Allergies    Medications  Current Outpatient Medications   Medication Instructions    acetaminophen (TYLENOL) 650 mg, nasogastric tube, Every 6 hours    amLODIPine (NORVASC) 10 mg, oral, Daily    bisacodyl (FLEET BISACODYL) 10 mg, rectal, Once    docusate sodium (COLACE) 100 mg, oral, 2 times daily     metoprolol tartrate (LOPRESSOR) 25 mg, oral, 2 times daily    ondansetron (ZOFRAN) 4 mg, oral, Every 6 hours PRN    pantoprazole (PROTONIX) 40 mg, oral, Daily, Do not crush, chew, or split.    sodium,potassium,mag sulfates (Suprep Bowel Prep Kit) 17.5-3.13-1.6 gram solution 2 bottles, oral, Once, Take as directed.        Objective   Visit Vitals  Pulse 76      Physical Exam  Constitutional:       Appearance: Normal appearance. He is normal weight.   HENT:      Mouth/Throat:      Mouth: Mucous membranes are dry.      Pharynx: Oropharynx is clear.   Cardiovascular:      Rate and Rhythm: Normal rate and regular rhythm.   Pulmonary:      Effort: Pulmonary effort is normal.      Breath sounds: Normal breath sounds. No wheezing or rhonchi.   Abdominal:      General: Abdomen is flat. A surgical scar is present. The ostomy site is clean. Bowel sounds are normal. There is no distension.      Palpations: Abdomen is soft. There is no hepatomegaly.      Tenderness: There is no abdominal tenderness. There is no guarding or rebound. Negative signs include Pérez's sign.      Hernia: No hernia is present.   Musculoskeletal:         General: Normal range of motion.   Skin:     General: Skin is warm and dry.   Neurological:      General: No focal deficit present.      Mental Status: He is alert and oriented to person, place, and time.   Psychiatric:         Mood and Affect: Mood normal.         Behavior: Behavior normal.           Lab Results   Component Value Date    WBC 6.8 06/19/2024    WBC 7.5 05/09/2024    WBC 6.6 05/08/2024    HGB 10.9 (L) 06/19/2024    HGB 8.4 (L) 05/09/2024    HGB 6.6 (L) 05/09/2024    HCT 32.1 (L) 06/19/2024    HCT 26.5 (L) 05/09/2024    HCT 21.2 (L) 05/09/2024     06/19/2024     05/09/2024     (H) 05/08/2024     Lab Results   Component Value Date     06/19/2024     05/09/2024     05/08/2024    K 4.1 06/19/2024    K 3.6 05/09/2024    K 3.6 05/08/2024      06/19/2024     05/09/2024     05/08/2024    CO2 26 06/19/2024    CO2 23 05/09/2024    CO2 21 05/08/2024    BUN 15 06/19/2024    BUN 34 (H) 05/09/2024    BUN 32 (H) 05/08/2024    CREATININE 1.04 06/19/2024    CREATININE 3.96 (H) 05/09/2024    CREATININE 4.77 (H) 05/08/2024    CALCIUM 9.2 06/19/2024    CALCIUM 7.2 (L) 05/09/2024    CALCIUM 7.5 (L) 05/08/2024    PROT 5.4 (L) 04/28/2024    PROT 5.0 (L) 04/27/2024    PROT 5.8 (L) 04/26/2024    BILITOT 0.5 04/28/2024    BILITOT 0.5 04/27/2024    BILITOT 0.4 04/26/2024    ALKPHOS 56 04/28/2024    ALKPHOS 53 04/27/2024    ALKPHOS 76 04/26/2024    ALT 6 (L) 04/28/2024    ALT 6 (L) 04/27/2024    ALT 7 (L) 04/26/2024    AST 17 04/28/2024    AST 14 04/27/2024    AST 9 04/26/2024    GLUCOSE 82 06/19/2024    GLUCOSE 97 05/09/2024    GLUCOSE 119 (H) 05/08/2024           James Ramirez is a 45 y.o. male who presents to GI clinic for diverticulitis.    Diverticulitis  Patient has colostomy for diverticulitis, pending reversal  - schedule colonoscopy w/ suprep and fleet enema     Follow up as needed         Alyse Miller, APRN-CNP

## 2025-02-25 NOTE — PATIENT INSTRUCTIONS
Please schedule your colonoscopy. You will need a ride since this involves sedation.  I will send a bowel prep to your pharmacy.  Clear liquid diet the day before the procedure. Start the 1st part of the prep at 6 pm the night prior and the other half 5 hrs before the procedure. Please also use fleet enema to cleanse the rectum prior to the procedure.  I recommend doing this with the second part of your prep.

## 2025-02-25 NOTE — ASSESSMENT & PLAN NOTE
Patient has colostomy for diverticulitis, pending reversal  - schedule colonoscopy w/ suprep and fleet enema     Follow up as needed

## 2025-02-26 ENCOUNTER — OFFICE VISIT (OUTPATIENT)
Dept: SURGERY | Facility: CLINIC | Age: 45
End: 2025-02-26
Payer: COMMERCIAL

## 2025-02-26 VITALS
WEIGHT: 175.2 LBS | OXYGEN SATURATION: 98 % | SYSTOLIC BLOOD PRESSURE: 146 MMHG | TEMPERATURE: 98.4 F | DIASTOLIC BLOOD PRESSURE: 93 MMHG | HEART RATE: 74 BPM | BODY MASS INDEX: 23.73 KG/M2 | HEIGHT: 72 IN

## 2025-02-26 DIAGNOSIS — Z93.3 COLOSTOMY STATUS (MULTI): Primary | ICD-10-CM

## 2025-02-26 PROCEDURE — 99213 OFFICE O/P EST LOW 20 MIN: CPT | Performed by: SURGERY

## 2025-02-26 PROCEDURE — 3008F BODY MASS INDEX DOCD: CPT | Performed by: SURGERY

## 2025-02-26 PROCEDURE — 1036F TOBACCO NON-USER: CPT | Performed by: SURGERY

## 2025-02-26 PROCEDURE — 3077F SYST BP >= 140 MM HG: CPT | Performed by: SURGERY

## 2025-02-26 PROCEDURE — 3080F DIAST BP >= 90 MM HG: CPT | Performed by: SURGERY

## 2025-02-26 ASSESSMENT — PAIN SCALES - GENERAL: PAINLEVEL_OUTOF10: 0-NO PAIN

## 2025-02-26 NOTE — PROGRESS NOTES
James Ramirez is a 45 y.o. male on day 0 of admission presenting with No Principal Problem: There is no principal problem currently on the Problem List. Please update the Problem List and refresh..    Assessment/Plan   Getting colonoscopy 3/12.  I reviewed with him option of eventual colostomy reversal. Lap vs open. One stage vs two stage with DLI. Potential risks of surgery reviewed; bleeding, infection, leak etc. Will come him to discuss timing once colonoscopy completed    Subjective   James doing well. Hasn't been in to see me for 6 months. Gardner procedure was last April.  Rescheduled colonoscopy for 3/12.        Objective     Physical Exam  NAD  A&Ox3  Non icteric  CTA  RR  Abdomen soft min tender. Wounds clean, intact. Stoma normal.  Extremities warm, well perfused     Last Recorded Vitals  Blood pressure (!) 146/93, pulse 74, temperature 36.9 °C (98.4 °F), height 1.829 m (6'), weight 79.5 kg (175 lb 3.2 oz), SpO2 98%.  Intake/Output last 3 Shifts:  No intake/output data recorded.    Relevant Results    Scheduled medications  silver nitrate applicators, , Topical, Once      Continuous medications     PRN medications      No results found for this or any previous visit (from the past 24 hours).        I spent 25 minutes in the professional and overall care of this patient.      Dionisio Salinas MD

## 2025-03-12 ENCOUNTER — APPOINTMENT (OUTPATIENT)
Dept: GASTROENTEROLOGY | Facility: EXTERNAL LOCATION | Age: 45
End: 2025-03-12
Payer: COMMERCIAL

## 2025-03-19 ENCOUNTER — APPOINTMENT (OUTPATIENT)
Dept: GASTROENTEROLOGY | Facility: EXTERNAL LOCATION | Age: 45
End: 2025-03-19
Payer: COMMERCIAL

## 2025-04-30 ENCOUNTER — TELEPHONE (OUTPATIENT)
Dept: SCHEDULING | Age: 45
End: 2025-04-30
Payer: COMMERCIAL

## 2025-05-16 ENCOUNTER — OFFICE VISIT (OUTPATIENT)
Dept: PRIMARY CARE | Facility: CLINIC | Age: 45
End: 2025-05-16
Payer: MEDICAID

## 2025-05-16 VITALS
DIASTOLIC BLOOD PRESSURE: 80 MMHG | WEIGHT: 168.4 LBS | BODY MASS INDEX: 22.32 KG/M2 | SYSTOLIC BLOOD PRESSURE: 142 MMHG | HEIGHT: 73 IN

## 2025-05-16 DIAGNOSIS — Z43.3 COLOSTOMY CARE: Primary | ICD-10-CM

## 2025-05-16 DIAGNOSIS — F41.9 ANXIETY: ICD-10-CM

## 2025-05-16 PROCEDURE — 3008F BODY MASS INDEX DOCD: CPT | Performed by: INTERNAL MEDICINE

## 2025-05-16 PROCEDURE — 99213 OFFICE O/P EST LOW 20 MIN: CPT | Performed by: INTERNAL MEDICINE

## 2025-05-16 PROCEDURE — 3079F DIAST BP 80-89 MM HG: CPT | Performed by: INTERNAL MEDICINE

## 2025-05-16 PROCEDURE — 3077F SYST BP >= 140 MM HG: CPT | Performed by: INTERNAL MEDICINE

## 2025-05-16 NOTE — PROGRESS NOTES
"Subjective   Patient ID: James Ramirez is a 45 y.o. male who presents for Establish Care (Various conditions).    James is a 45-year-old young man today came to my office first time.  He was at [facility].  Dr. Zahira Miller took care of it.  He has anxiety and stress.  He wants to see psychiatrist.  He needs another reversal of colostomy surgery.  He came for follow-up.    PAST MEDICAL HISTORY: Colon disease, he has a colostomy for it.  He might need reversal.    CURRENT MEDICATIONS: He denies any medication.    IMMUNIZATION: Tetanus within the last 10 years.    I have personally reviewed the patient's Past Medical History, Medications, Allergies, Social History, and Family History in the EMR.    Review of Systems   All other systems reviewed and are negative.    Objective   /80   Ht 1.854 m (6' 1\")   Wt 76.4 kg (168 lb 6.4 oz)   BMI 22.22 kg/m²     Physical Exam  Vitals reviewed.   HENT:      Right Ear: Tympanic membrane, ear canal and external ear normal.      Left Ear: Tympanic membrane, ear canal and external ear normal.   Eyes:      General: No scleral icterus.     Pupils: Pupils are equal, round, and reactive to light.   Neck:      Vascular: No carotid bruit.   Cardiovascular:      Heart sounds: Normal heart sounds, S1 normal and S2 normal. No murmur heard.     No friction rub.   Pulmonary:      Effort: Pulmonary effort is normal.      Breath sounds: Normal breath sounds and air entry.   Abdominal:      Palpations: There is no hepatomegaly, splenomegaly or mass.      Comments: Colostomy okay.   Musculoskeletal:         General: No swelling or deformity. Normal range of motion.      Cervical back: Neck supple.      Right lower leg: No edema.      Left lower leg: No edema.   Lymphadenopathy:      Cervical: No cervical adenopathy.      Upper Body:      Right upper body: No axillary adenopathy.      Left upper body: No axillary adenopathy.      Lower Body: No right inguinal adenopathy. No left inguinal " adenopathy.   Neurological:      Mental Status: He is oriented to person, place, and time.      Cranial Nerves: Cranial nerves 2-12 are intact. No cranial nerve deficit.      Sensory: No sensory deficit.      Motor: Motor function is intact. No weakness.      Gait: Gait is intact.      Deep Tendon Reflexes: Reflexes normal.   Psychiatric:         Mood and Affect: Mood normal. Mood is not anxious or depressed. Affect is not angry.         Behavior: Behavior is not agitated.         Thought Content: Thought content normal.         Judgment: Judgment normal.     LAB WORK: Laboratory testing discussed.    Assessment/Plan   Problem List Items Addressed This Visit           ICD-10-CM    Colostomy care - Primary Z43.3     Other Visit Diagnoses         Codes      Anxiety     F41.9        1. Colostomy, going for reversal surgery.  Monitor.  2. Anxiety and stress.  He needs to see psychiatrist.  Monitor.  blood work ordered.  3. Job related issue.  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  He thinks they might have caused him disease.  I think it is worth exploring.  Recommended to see Occupational Medicine specialist to figure out and see the toxicity.  I told that I do not have any capacity to do that investigation and treatment.  4. I will continue to follow.    Jelena Attestation  By signing my name below, IStacy Scribe attest that this documentation has been prepared under the direction and in the presence of Vanessa Miller MD.     All medical record entries made by the scribe were personally dictated by me I have reviewed the chart and agree the record accurately reflects my personal performance of his history physical examination and management

## 2025-05-23 ENCOUNTER — CLINICAL SUPPORT (OUTPATIENT)
Dept: SURGERY | Facility: CLINIC | Age: 45
End: 2025-05-23
Payer: MEDICAID

## 2025-05-23 DIAGNOSIS — Z43.3 COLOSTOMY CARE: ICD-10-CM

## 2025-05-23 PROCEDURE — 99211 OFF/OP EST MAY X REQ PHY/QHP: CPT | Performed by: SURGERY

## 2025-05-23 NOTE — NURSING NOTE
"[facility] nursing visit outcome: Mr. Ramirez came to the stoma clinic because he needs to get more supplies. He had lost his insurance d/t job loss, but now has state insurance. His pouching system was assessed and a 1-piece, closed system was recommended, because he will not need to obtain wafers for that system. It is the wafers that he runs out of before he's able to order more supplies.   With his permission, 1-piece, closed samples from [employer], and [employer] were requested.     Mr. Ramirez expressed concern/anxiety regarding having his stoma taken down because of \"being cut into\" and some anxiety because of the experience from his last surgery. [facility] RN provided support and suggested he discuss these concerns with Dr. Salinas, who could discuss them in more detail. [facility] RN told him that he would be in better physical/nutritional shape for the next surgery than he was for the first surgery, which was emergent.     [facility] next scheduled visit/plan: TBD - Mr. Ramirez will call as needed    Stoma Type: End Colostomy  Del: No  Diameter: rounds out to about 1 5/8\"  Location: LUQ  Protrusion: flush edges, slightly protruding center (at os)  Mucosal Condition and Color: Moist, Red  Mucocutaneous Junction: Intact  Peristomal Skin: Clear, intact and with some hyperpigmentation  Location of Skin Impairment: as noted  Peristomal Contour: Bulged and this is greater lateral to stoma  Supportive Tissue: Semi-Soft  Character of Output: Brown, Pasty Stool, and Formed Stool  Emptying Frequency: about 2-3 per day (changes pouch this frequently)  Removed/Current Pouching System: Mr. Ramirez had run out of wafers, so he was wearing a closed pouch from a 2-piece system, with a ring  Current Wearing Time: placed earlier today - Mr. Ramirez changes the wafer nearly daily d/t it becoming soiled    Recommendations:   Skin Care: continue per usual  Pouching System: a 1-piece flat, closed end pouch will allow pouch to be changed twice daily without use " of a barrier ring or barrier wafer - this should allow Mr. Ramirez to obtain sufficient supplies (60 closed-end 1-piece pouches can be obtained per month)  Wear Time: change pouch 2x daily, as needed  Other: n/a    Incision: healed scar    Supplier: [employer]    Comments: Provided available samples of both 2-piece closed and drainable systems (only soft convex wafers were available), and 1-piece drainable systems to use until he can order more supplies.     Time Increment: 60 minutes    Preeti Maier, CHIDIN, RN, CWOCN

## 2025-06-03 ENCOUNTER — TELEPHONE (OUTPATIENT)
Dept: WOUND CARE | Facility: HOSPITAL | Age: 45
End: 2025-06-03
Payer: MEDICAID

## 2025-06-03 NOTE — NURSING NOTE
Pt called with concerns of green liquid from the stoma.  Called and spoke to patient to assess whether he had any other symptoms such as nausea, vomiting, pain which he denied.  Also discussed diet and he denies any changes in his PO intake.  Educated that sometimes the small bowel bile may empty through colostomy and as long as he is asymptomatic otherwise, to not be concerned.      Pt verbalized understanding    Shwetha MURILLON, RN, CWOCN

## 2025-06-23 ENCOUNTER — TELEPHONE (OUTPATIENT)
Dept: SURGERY | Facility: CLINIC | Age: 45
End: 2025-06-23
Payer: MEDICAID

## 2025-06-23 NOTE — TELEPHONE ENCOUNTER
Message received to call patient last week. Spoke with patient . He is requesting a letter for work restrictions. He is currently looking for work. Patient states that his strength is not  fully back.

## 2025-07-23 ENCOUNTER — TELEPHONE (OUTPATIENT)
Dept: SURGERY | Facility: CLINIC | Age: 45
End: 2025-07-23
Payer: MEDICAID

## 2025-07-23 NOTE — TELEPHONE ENCOUNTER
Patient called to inquire can he have ostomy reversal before colonoscopy. I informed patient that before reconnecting the bowel after an ostomy, doctors want to be sure the area is healthy and ready for the reversal.  Part of pre-surgery evaluation.

## 2025-07-24 ENCOUNTER — APPOINTMENT (OUTPATIENT)
Dept: GASTROENTEROLOGY | Facility: HOSPITAL | Age: 45
End: 2025-07-24
Payer: MEDICAID

## 2025-08-08 ENCOUNTER — TELEPHONE (OUTPATIENT)
Dept: SURGERY | Facility: CLINIC | Age: 45
End: 2025-08-08
Payer: MEDICAID

## 2025-08-08 NOTE — TELEPHONE ENCOUNTER
Patient called with complaints of dehydration. Patient states that he took a 6 mile walk and his mouth got dry, felt like he was going to pass out. I informed patient that he should go to ED or call 911 if he feels faint. This did not happen today. Patient states that he was told that he can get dehydrated faster after his surgery. He is inquiring as to what he can do to prevent this. Increase fluid intake and if symptoms persist seek immediate medical advice or call 911. Patient understood. Transferred to scheduling to set up appointment with PCP.

## 2025-08-13 ENCOUNTER — APPOINTMENT (OUTPATIENT)
Dept: PRIMARY CARE | Facility: HOSPITAL | Age: 45
End: 2025-08-13
Payer: MEDICAID

## (undated) DEVICE — LIGASURE IMPACT, 18CM

## (undated) DEVICE — SUTURE, MONOCRYL, 4-0, 18 IN, PS2, UNDYED

## (undated) DEVICE — ADHESIVE, SKIN, LIQUIBAND EXCEED

## (undated) DEVICE — SPONGE, LAP, XRAY DECT, 18IN X 18IN, W/MASTER DMT, STERILE

## (undated) DEVICE — TOWEL, SURGICAL, NEURO, O/R, 16 X 26, BLUE, STERILE

## (undated) DEVICE — BARRIER, SKIN, SUR-FIT NATURA, DURAHESIVE, FLAT MOLDABLE, 2-3/4, X-LARGE"

## (undated) DEVICE — DRESSING KIT, V.A.C., W/DRAPE/TUBING, MEDIUM, FOAM 5PK

## (undated) DEVICE — WOUND VAC KIT, W/CANNISTER, 120 CC

## (undated) DEVICE — SUTURE, VICRYL, 0, 27 IN, UR-6, VIOLET

## (undated) DEVICE — GLOVE, SURGICAL, PROTEXIS PI ORTHO, 7.0, PF, LF

## (undated) DEVICE — DRAIN, WOUND, FLAT, HUBLESS, FULL LENGTH PERFORATION, 10 MM X 20 CM, SILICONE

## (undated) DEVICE — TUBING, SUCTION, NON-CONDUCTIVE, W/CONNECT,.25 IN X 12 FT, STERILE, LF

## (undated) DEVICE — SUTURE, ETHILON, 2-0, 18 IN, FS, BLACK, BX/12

## (undated) DEVICE — CAUTERY, PENCIL, PUSH BUTTON, SMOKE EVAC, 70MM

## (undated) DEVICE — CUTTER, PROXIMATE LINEAR RELOAD, 75MM, BLUE

## (undated) DEVICE — SCOPE WARMER, LAPAROSCOPE, BAG ONLY, LF

## (undated) DEVICE — DRESSING, TEGADERM, CHG, 3.5 X 4.5 IN

## (undated) DEVICE — CUTTER, PROX LINEAR, 75MM, REG TISSUE, W/ SAFETY LOCK OUT

## (undated) DEVICE — SEAL, COHESIVE, EAKIN, SMALL, 2 IN

## (undated) DEVICE — SOLUTION, IRRIGATION, SODIUM CHLORIDE 0.9%, 1000 ML, POUR BOTTLE